# Patient Record
Sex: MALE | Race: WHITE | Employment: FULL TIME | URBAN - METROPOLITAN AREA
[De-identification: names, ages, dates, MRNs, and addresses within clinical notes are randomized per-mention and may not be internally consistent; named-entity substitution may affect disease eponyms.]

---

## 2017-01-06 ENCOUNTER — HOSPITAL ENCOUNTER (OUTPATIENT)
Dept: LAB | Age: 52
Discharge: HOME | End: 2017-01-06
Payer: COMMERCIAL

## 2017-01-06 ENCOUNTER — OFFICE VISIT (OUTPATIENT)
Dept: FAMILY MEDICINE CLINIC | Age: 52
End: 2017-01-06

## 2017-01-06 VITALS
BODY MASS INDEX: 41.02 KG/M2 | HEIGHT: 71 IN | DIASTOLIC BLOOD PRESSURE: 70 MMHG | RESPIRATION RATE: 24 BRPM | HEART RATE: 77 BPM | OXYGEN SATURATION: 97 % | SYSTOLIC BLOOD PRESSURE: 130 MMHG | WEIGHT: 293 LBS | TEMPERATURE: 98.6 F

## 2017-01-06 DIAGNOSIS — Z11.59 NEED FOR HEPATITIS C SCREENING TEST: ICD-10-CM

## 2017-01-06 DIAGNOSIS — I10 ESSENTIAL HYPERTENSION: ICD-10-CM

## 2017-01-06 DIAGNOSIS — F41.0 PANIC ATTACK: ICD-10-CM

## 2017-01-06 DIAGNOSIS — Z11.3 SCREEN FOR STD (SEXUALLY TRANSMITTED DISEASE): ICD-10-CM

## 2017-01-06 DIAGNOSIS — F33.1 MODERATE EPISODE OF RECURRENT MAJOR DEPRESSIVE DISORDER (HCC): ICD-10-CM

## 2017-01-06 DIAGNOSIS — M10.9 ACUTE GOUT OF HAND, UNSPECIFIED CAUSE, UNSPECIFIED LATERALITY: ICD-10-CM

## 2017-01-06 DIAGNOSIS — F41.1 GENERALIZED ANXIETY DISORDER: ICD-10-CM

## 2017-01-06 DIAGNOSIS — F33.1 MODERATE EPISODE OF RECURRENT MAJOR DEPRESSIVE DISORDER (HCC): Primary | ICD-10-CM

## 2017-01-06 LAB
ALBUMIN SERPL-MCNC: 4.7 G/DL (ref 3.9–4.9)
ALP BLD-CCNC: 89 U/L (ref 35–104)
ALT SERPL-CCNC: 38 U/L (ref 0–41)
ANION GAP SERPL CALCULATED.3IONS-SCNC: 15 MEQ/L (ref 7–13)
AST SERPL-CCNC: 46 U/L (ref 0–40)
BASOPHILS ABSOLUTE: 0 K/UL (ref 0–0.2)
BASOPHILS RELATIVE PERCENT: 0.7 %
BILIRUB SERPL-MCNC: 0.9 MG/DL (ref 0–1.2)
BUN BLDV-MCNC: 6 MG/DL (ref 6–20)
CALCIUM SERPL-MCNC: 9.1 MG/DL (ref 8.6–10.2)
CHLORIDE BLD-SCNC: 95 MEQ/L (ref 98–107)
CHOLESTEROL, TOTAL: 193 MG/DL (ref 0–199)
CO2: 24 MEQ/L (ref 22–29)
CREAT SERPL-MCNC: 0.63 MG/DL (ref 0.7–1.2)
EOSINOPHILS ABSOLUTE: 0.1 K/UL (ref 0–0.7)
EOSINOPHILS RELATIVE PERCENT: 3.4 %
GFR AFRICAN AMERICAN: >60
GFR NON-AFRICAN AMERICAN: >60
GLOBULIN: 2.3 G/DL (ref 2.3–3.5)
GLUCOSE BLD-MCNC: 90 MG/DL (ref 74–109)
HCT VFR BLD CALC: 45.3 % (ref 42–52)
HDLC SERPL-MCNC: 77 MG/DL (ref 40–59)
HEMOGLOBIN: 15 G/DL (ref 14–18)
HEPATITIS C ANTIBODY INTERPRETATION: NORMAL
LDL CHOLESTEROL CALCULATED: 101 MG/DL (ref 0–129)
LYMPHOCYTES ABSOLUTE: 1 K/UL (ref 1–4.8)
LYMPHOCYTES RELATIVE PERCENT: 24 %
MCH RBC QN AUTO: 33.9 PG (ref 27–31.3)
MCHC RBC AUTO-ENTMCNC: 33.2 % (ref 33–37)
MCV RBC AUTO: 102 FL (ref 80–100)
MONOCYTES ABSOLUTE: 0.5 K/UL (ref 0.2–0.8)
MONOCYTES RELATIVE PERCENT: 11.9 %
NEUTROPHILS ABSOLUTE: 2.6 K/UL (ref 1.4–6.5)
NEUTROPHILS RELATIVE PERCENT: 60 %
PDW BLD-RTO: 14.4 % (ref 11.5–14.5)
PLATELET # BLD: 133 K/UL (ref 130–400)
POTASSIUM SERPL-SCNC: 4.2 MEQ/L (ref 3.5–5.1)
RBC # BLD: 4.44 M/UL (ref 4.7–6.1)
SODIUM BLD-SCNC: 134 MEQ/L (ref 132–144)
T4 FREE: 0.93 NG/DL (ref 0.93–1.7)
TOTAL PROTEIN: 7 G/DL (ref 6.4–8.1)
TRIGL SERPL-MCNC: 74 MG/DL (ref 0–200)
TSH SERPL DL<=0.05 MIU/L-ACNC: 1.17 UIU/ML (ref 0.27–4.2)
URIC ACID, SERUM: 8.5 MG/DL (ref 3.4–7)
WBC # BLD: 4.4 K/UL (ref 4.8–10.8)

## 2017-01-06 PROCEDURE — 86803 HEPATITIS C AB TEST: CPT

## 2017-01-06 PROCEDURE — 36415 COLL VENOUS BLD VENIPUNCTURE: CPT

## 2017-01-06 PROCEDURE — 86592 SYPHILIS TEST NON-TREP QUAL: CPT

## 2017-01-06 PROCEDURE — 80053 COMPREHEN METABOLIC PANEL: CPT

## 2017-01-06 PROCEDURE — 86694 HERPES SIMPLEX NES ANTBDY: CPT

## 2017-01-06 PROCEDURE — 84439 ASSAY OF FREE THYROXINE: CPT

## 2017-01-06 PROCEDURE — 86703 HIV-1/HIV-2 1 RESULT ANTBDY: CPT

## 2017-01-06 PROCEDURE — 84550 ASSAY OF BLOOD/URIC ACID: CPT

## 2017-01-06 PROCEDURE — 99213 OFFICE O/P EST LOW 20 MIN: CPT | Performed by: NURSE PRACTITIONER

## 2017-01-06 PROCEDURE — 85025 COMPLETE CBC W/AUTO DIFF WBC: CPT

## 2017-01-06 PROCEDURE — 80061 LIPID PANEL: CPT

## 2017-01-06 PROCEDURE — 84443 ASSAY THYROID STIM HORMONE: CPT

## 2017-01-06 PROCEDURE — 86696 HERPES SIMPLEX TYPE 2 TEST: CPT

## 2017-01-06 RX ORDER — ALPRAZOLAM 0.25 MG/1
0.25 TABLET ORAL 3 TIMES DAILY PRN
Qty: 20 TABLET | Refills: 0 | Status: SHIPPED | OUTPATIENT
Start: 2017-01-06 | End: 2017-02-05

## 2017-01-06 RX ORDER — FLUOXETINE HYDROCHLORIDE 40 MG/1
40 CAPSULE ORAL DAILY
Qty: 30 CAPSULE | Refills: 2 | Status: SHIPPED | OUTPATIENT
Start: 2017-01-06 | End: 2017-09-20 | Stop reason: ALTCHOICE

## 2017-01-08 LAB
HERPES TYPE 1/2 IGM COMBINED: 0.55 IV
HERPES TYPE I/II IGG COMBINED: 9.51 IV
HIV-1 AND HIV-2 ANTIBODIES: NEGATIVE
RPR: NORMAL

## 2017-05-05 ENCOUNTER — TELEPHONE (OUTPATIENT)
Dept: FAMILY MEDICINE CLINIC | Age: 52
End: 2017-05-05

## 2017-05-26 DIAGNOSIS — I10 ESSENTIAL HYPERTENSION: ICD-10-CM

## 2017-05-26 DIAGNOSIS — M10.9 ACUTE GOUT OF HAND, UNSPECIFIED CAUSE, UNSPECIFIED LATERALITY: ICD-10-CM

## 2017-05-26 RX ORDER — COLCHICINE 0.6 MG/1
0.6 TABLET ORAL DAILY
Qty: 30 TABLET | Refills: 0 | Status: SHIPPED | OUTPATIENT
Start: 2017-05-26 | End: 2017-06-12 | Stop reason: CLARIF

## 2017-05-26 RX ORDER — LISINOPRIL 40 MG/1
40 TABLET ORAL DAILY
Qty: 30 TABLET | Refills: 3 | Status: SHIPPED | OUTPATIENT
Start: 2017-05-26 | End: 2017-10-15 | Stop reason: SDUPTHER

## 2017-06-12 ENCOUNTER — TELEPHONE (OUTPATIENT)
Dept: FAMILY MEDICINE CLINIC | Age: 52
End: 2017-06-12

## 2017-06-12 RX ORDER — COLCHICINE 0.6 MG/1
1 CAPSULE ORAL DAILY
Qty: 30 CAPSULE | Refills: 5 | Status: SHIPPED | OUTPATIENT
Start: 2017-06-12 | End: 2017-11-30 | Stop reason: SDUPTHER

## 2017-07-05 ENCOUNTER — OFFICE VISIT (OUTPATIENT)
Dept: FAMILY MEDICINE CLINIC | Age: 52
End: 2017-07-05

## 2017-07-05 VITALS
WEIGHT: 305.6 LBS | BODY MASS INDEX: 42.78 KG/M2 | RESPIRATION RATE: 16 BRPM | TEMPERATURE: 97.9 F | SYSTOLIC BLOOD PRESSURE: 146 MMHG | HEIGHT: 71 IN | DIASTOLIC BLOOD PRESSURE: 76 MMHG | HEART RATE: 88 BPM

## 2017-07-05 DIAGNOSIS — M79.89 SWELLING OF RIGHT LOWER EXTREMITY: Primary | ICD-10-CM

## 2017-07-05 PROCEDURE — 99213 OFFICE O/P EST LOW 20 MIN: CPT | Performed by: NURSE PRACTITIONER

## 2017-07-07 ENCOUNTER — HOSPITAL ENCOUNTER (OUTPATIENT)
Dept: ULTRASOUND IMAGING | Age: 52
Discharge: HOME | End: 2017-07-07
Payer: COMMERCIAL

## 2017-07-07 DIAGNOSIS — M79.89 SWELLING OF RIGHT LOWER EXTREMITY: ICD-10-CM

## 2017-07-07 PROCEDURE — 93971 EXTREMITY STUDY: CPT

## 2017-07-09 ASSESSMENT — ENCOUNTER SYMPTOMS
CONSTIPATION: 0
BACK PAIN: 0
SHORTNESS OF BREATH: 0
ABDOMINAL DISTENTION: 0
NAUSEA: 0
ABDOMINAL PAIN: 0
VOMITING: 0
COUGH: 0
COLOR CHANGE: 0
CHEST TIGHTNESS: 0
ANAL BLEEDING: 0
WHEEZING: 0
DIARRHEA: 0
BLOOD IN STOOL: 0

## 2017-09-20 ENCOUNTER — OFFICE VISIT (OUTPATIENT)
Dept: FAMILY MEDICINE CLINIC | Age: 52
End: 2017-09-20

## 2017-09-20 VITALS
HEART RATE: 74 BPM | OXYGEN SATURATION: 93 % | BODY MASS INDEX: 41.72 KG/M2 | DIASTOLIC BLOOD PRESSURE: 80 MMHG | TEMPERATURE: 98.6 F | SYSTOLIC BLOOD PRESSURE: 130 MMHG | HEIGHT: 71 IN | WEIGHT: 298 LBS | RESPIRATION RATE: 18 BRPM

## 2017-09-20 DIAGNOSIS — J11.1 FLU SYNDROME: Primary | ICD-10-CM

## 2017-09-20 DIAGNOSIS — J02.0 STREP SORE THROAT: ICD-10-CM

## 2017-09-20 LAB
INFLUENZA A ANTIBODY: ABNORMAL
INFLUENZA B ANTIBODY: NEGATIVE
S PYO AG THROAT QL: NORMAL

## 2017-09-20 PROCEDURE — 99213 OFFICE O/P EST LOW 20 MIN: CPT | Performed by: FAMILY MEDICINE

## 2017-09-20 PROCEDURE — 87880 STREP A ASSAY W/OPTIC: CPT | Performed by: FAMILY MEDICINE

## 2017-09-20 PROCEDURE — 87804 INFLUENZA ASSAY W/OPTIC: CPT | Performed by: FAMILY MEDICINE

## 2017-09-20 RX ORDER — OSELTAMIVIR PHOSPHATE 75 MG/1
75 CAPSULE ORAL 2 TIMES DAILY
Qty: 10 CAPSULE | Refills: 0 | Status: SHIPPED | OUTPATIENT
Start: 2017-09-20 | End: 2017-09-25

## 2017-09-22 ASSESSMENT — ENCOUNTER SYMPTOMS
SORE THROAT: 1
CONSTIPATION: 0
RHINORRHEA: 0
SHORTNESS OF BREATH: 0
WHEEZING: 0
BLOOD IN STOOL: 0
APNEA: 0
ABDOMINAL PAIN: 0
COUGH: 1
DIARRHEA: 0
CHEST TIGHTNESS: 0
NAUSEA: 0
VOMITING: 0
SWOLLEN GLANDS: 0
SINUS PAIN: 0

## 2017-09-28 ENCOUNTER — TELEPHONE (OUTPATIENT)
Dept: FAMILY MEDICINE CLINIC | Age: 52
End: 2017-09-28

## 2017-09-29 ENCOUNTER — HOSPITAL ENCOUNTER (OUTPATIENT)
Age: 52
Discharge: HOME | End: 2017-09-29
Payer: COMMERCIAL

## 2017-09-29 ENCOUNTER — OFFICE VISIT (OUTPATIENT)
Dept: FAMILY MEDICINE CLINIC | Age: 52
End: 2017-09-29

## 2017-09-29 ENCOUNTER — HOSPITAL ENCOUNTER (OUTPATIENT)
Dept: GENERAL RADIOLOGY | Age: 52
Discharge: HOME | End: 2017-09-29
Payer: COMMERCIAL

## 2017-09-29 VITALS
DIASTOLIC BLOOD PRESSURE: 80 MMHG | BODY MASS INDEX: 41.8 KG/M2 | SYSTOLIC BLOOD PRESSURE: 140 MMHG | RESPIRATION RATE: 18 BRPM | HEIGHT: 70 IN | WEIGHT: 292 LBS | TEMPERATURE: 97.8 F | HEART RATE: 88 BPM | OXYGEN SATURATION: 95 %

## 2017-09-29 DIAGNOSIS — J06.9 VIRAL UPPER RESPIRATORY TRACT INFECTION: ICD-10-CM

## 2017-09-29 DIAGNOSIS — J06.9 VIRAL UPPER RESPIRATORY TRACT INFECTION: Primary | ICD-10-CM

## 2017-09-29 PROCEDURE — 71020 XR CHEST STANDARD TWO VW: CPT

## 2017-09-29 PROCEDURE — 99213 OFFICE O/P EST LOW 20 MIN: CPT | Performed by: FAMILY MEDICINE

## 2017-09-29 RX ORDER — LEVOFLOXACIN 750 MG/1
750 TABLET ORAL DAILY
Qty: 5 TABLET | Refills: 0 | Status: SHIPPED | OUTPATIENT
Start: 2017-09-29 | End: 2017-10-04

## 2017-09-29 ASSESSMENT — ENCOUNTER SYMPTOMS
CHEST TIGHTNESS: 0
NAUSEA: 0
ABDOMINAL PAIN: 0
VOMITING: 0
COUGH: 1
WHEEZING: 0
APNEA: 0
CONSTIPATION: 0
SHORTNESS OF BREATH: 0
SORE THROAT: 0
DIARRHEA: 0
RHINORRHEA: 0
BLOOD IN STOOL: 0

## 2017-10-15 DIAGNOSIS — I10 ESSENTIAL HYPERTENSION: ICD-10-CM

## 2017-10-15 NOTE — TELEPHONE ENCOUNTER
Pharmacy is requesting refill. Please approve or deny this refill request. The order is pended. Thank you. Requested Prescriptions     Pending Prescriptions Disp Refills    lisinopril (PRINIVIL;ZESTRIL) 40 MG tablet [Pharmacy Med Name: LISINOPRIL 40 MG TABLET] 30 tablet 3     Sig: take 1 tablet by mouth once daily       LOV Visit date not found    Next Visit Date:  No future appointments.

## 2017-10-16 RX ORDER — LISINOPRIL 40 MG/1
TABLET ORAL
Qty: 30 TABLET | Refills: 3 | Status: SHIPPED | OUTPATIENT
Start: 2017-10-16 | End: 2018-02-23 | Stop reason: SDUPTHER

## 2017-11-30 RX ORDER — COLCHICINE 0.6 MG/1
1 CAPSULE ORAL DAILY
Qty: 90 CAPSULE | Refills: 1 | Status: SHIPPED | OUTPATIENT
Start: 2017-11-30 | End: 2017-12-18 | Stop reason: SDUPTHER

## 2017-12-02 DIAGNOSIS — M10.9 ACUTE GOUT OF HAND, UNSPECIFIED CAUSE, UNSPECIFIED LATERALITY: ICD-10-CM

## 2017-12-12 DIAGNOSIS — M10.9 ACUTE GOUT OF HAND, UNSPECIFIED CAUSE, UNSPECIFIED LATERALITY: Primary | ICD-10-CM

## 2017-12-12 RX ORDER — COLCHICINE 0.6 MG/1
1 CAPSULE ORAL DAILY
Qty: 90 CAPSULE | Refills: 1 | OUTPATIENT
Start: 2017-12-12

## 2017-12-18 RX ORDER — COLCHICINE 0.6 MG/1
1 CAPSULE ORAL DAILY
Qty: 30 CAPSULE | Refills: 0 | Status: SHIPPED | OUTPATIENT
Start: 2017-12-18 | End: 2018-02-01 | Stop reason: SDUPTHER

## 2017-12-18 NOTE — TELEPHONE ENCOUNTER
Patient is aware. States he does not use it daily. Can you send 30 day to [facility] in [place]. (RX is pending in this encounter).

## 2018-02-01 ENCOUNTER — TELEPHONE (OUTPATIENT)
Dept: FAMILY MEDICINE CLINIC | Age: 53
End: 2018-02-01

## 2018-02-01 ENCOUNTER — HOSPITAL ENCOUNTER (OUTPATIENT)
Dept: ULTRASOUND IMAGING | Age: 53
Discharge: HOME | End: 2018-02-03
Payer: COMMERCIAL

## 2018-02-01 ENCOUNTER — OFFICE VISIT (OUTPATIENT)
Dept: FAMILY MEDICINE CLINIC | Age: 53
End: 2018-02-01
Payer: COMMERCIAL

## 2018-02-01 ENCOUNTER — HOSPITAL ENCOUNTER (OUTPATIENT)
Dept: LAB | Age: 53
Discharge: HOME | End: 2018-02-01
Payer: COMMERCIAL

## 2018-02-01 DIAGNOSIS — M10.9 ACUTE GOUT OF HAND, UNSPECIFIED CAUSE, UNSPECIFIED LATERALITY: ICD-10-CM

## 2018-02-01 DIAGNOSIS — M79.89 SWELLING OF LOWER EXTREMITY: ICD-10-CM

## 2018-02-01 DIAGNOSIS — G62.9 PERIPHERAL POLYNEUROPATHY: ICD-10-CM

## 2018-02-01 DIAGNOSIS — M10.9 GOUT, UNSPECIFIED CAUSE, UNSPECIFIED CHRONICITY, UNSPECIFIED SITE: ICD-10-CM

## 2018-02-01 DIAGNOSIS — M79.89 SWELLING OF LOWER EXTREMITY: Primary | ICD-10-CM

## 2018-02-01 LAB
ANION GAP SERPL CALCULATED.3IONS-SCNC: 14 MEQ/L (ref 7–13)
BUN BLDV-MCNC: 9 MG/DL (ref 6–20)
CALCIUM SERPL-MCNC: 9.3 MG/DL (ref 8.6–10.2)
CHLORIDE BLD-SCNC: 96 MEQ/L (ref 98–107)
CO2: 27 MEQ/L (ref 22–29)
CREAT SERPL-MCNC: 0.78 MG/DL (ref 0.7–1.2)
FOLATE: 6 NG/ML (ref 7.3–26.1)
GFR AFRICAN AMERICAN: >60
GFR NON-AFRICAN AMERICAN: >60
GLUCOSE BLD-MCNC: 105 MG/DL (ref 74–109)
POTASSIUM SERPL-SCNC: 4.4 MEQ/L (ref 3.5–5.1)
SODIUM BLD-SCNC: 137 MEQ/L (ref 132–144)
TSH REFLEX: 2.29 UIU/ML (ref 0.27–4.2)
VITAMIN B-12: <150 PG/ML (ref 232–1245)

## 2018-02-01 PROCEDURE — 82746 ASSAY OF FOLIC ACID SERUM: CPT

## 2018-02-01 PROCEDURE — G8484 FLU IMMUNIZE NO ADMIN: HCPCS | Performed by: FAMILY MEDICINE

## 2018-02-01 PROCEDURE — 36415 COLL VENOUS BLD VENIPUNCTURE: CPT

## 2018-02-01 PROCEDURE — G8427 DOCREV CUR MEDS BY ELIG CLIN: HCPCS | Performed by: FAMILY MEDICINE

## 2018-02-01 PROCEDURE — 3017F COLORECTAL CA SCREEN DOC REV: CPT | Performed by: FAMILY MEDICINE

## 2018-02-01 PROCEDURE — 99214 OFFICE O/P EST MOD 30 MIN: CPT | Performed by: FAMILY MEDICINE

## 2018-02-01 PROCEDURE — 4004F PT TOBACCO SCREEN RCVD TLK: CPT | Performed by: FAMILY MEDICINE

## 2018-02-01 PROCEDURE — G8417 CALC BMI ABV UP PARAM F/U: HCPCS | Performed by: FAMILY MEDICINE

## 2018-02-01 PROCEDURE — 93970 EXTREMITY STUDY: CPT

## 2018-02-01 PROCEDURE — 84443 ASSAY THYROID STIM HORMONE: CPT

## 2018-02-01 PROCEDURE — 80048 BASIC METABOLIC PNL TOTAL CA: CPT

## 2018-02-01 PROCEDURE — 82607 VITAMIN B-12: CPT

## 2018-02-01 RX ORDER — COLCHICINE 0.6 MG/1
1 CAPSULE ORAL DAILY
Qty: 30 CAPSULE | Refills: 0 | Status: SHIPPED | OUTPATIENT
Start: 2018-02-01 | End: 2018-04-05 | Stop reason: SDUPTHER

## 2018-02-01 RX ORDER — ALLOPURINOL 300 MG/1
300 TABLET ORAL DAILY
Qty: 30 TABLET | Refills: 3 | Status: SHIPPED | OUTPATIENT
Start: 2018-02-01 | End: 2018-03-06 | Stop reason: SDUPTHER

## 2018-02-01 ASSESSMENT — ENCOUNTER SYMPTOMS
CONSTIPATION: 0
APNEA: 0
BLOOD IN STOOL: 0
VOMITING: 0
SHORTNESS OF BREATH: 0
CHEST TIGHTNESS: 0
DIARRHEA: 0
ABDOMINAL PAIN: 0
NAUSEA: 0
COUGH: 0

## 2018-02-01 ASSESSMENT — PATIENT HEALTH QUESTIONNAIRE - PHQ9
SUM OF ALL RESPONSES TO PHQ QUESTIONS 1-9: 0
2. FEELING DOWN, DEPRESSED OR HOPELESS: 0
SUM OF ALL RESPONSES TO PHQ9 QUESTIONS 1 & 2: 0
1. LITTLE INTEREST OR PLEASURE IN DOING THINGS: 0

## 2018-02-01 NOTE — PROGRESS NOTES
Subjective:      Patient ID: Srinath Bar is a 45 y.o. male who presents today for:  Chief Complaint   Patient presents with    Foot Pain     pt states statews for x2 weeks he has been having his feet feeling cold, stiff and hard to walk. started mild and started worsening. states his feet throb once shoes are off. right foot is worse then left. pt tried soaking feet and taking antiimflammatory and motrin without relief    Health Maintenance     declined till he gets new insurance       HPI   Pt complains of lower extremity swelling x 2 weeks. He states that it began with right foot and now involves his left foot mildly. He states that it associated with a throbbing sensation and mild pain. He states that he has tried compression socks but that his foot throbs when the sock is taken off. He also admitted to a numbness sensation that primarily involves his right foot. He denies any back pain, chest pain or shortness of breath. The swelling began in his right foot but now is up to mid shin level  Past Medical History:   Diagnosis Date    Depression     Dyslipidemia     Gout     Hypertension     Left varicocele     Lumbar sprain 2006    Obesity     Panic attack 1/6/2017    Perirectal abscess     Thoracic sprain 8864    Umbilical hernia      Past Surgical History:   Procedure Laterality Date    HERNIA REPAIR  11/2010     Family History   Problem Relation Age of Onset    Diabetes Mother     Stroke Mother     Coronary Art Dis Mother     Arthritis Father      gouty     Social History     Social History    Marital status:      Spouse name: N/A    Number of children: N/A    Years of education: N/A     Occupational History    Not on file.      Social History Main Topics    Smoking status: Never Smoker    Smokeless tobacco: Current User     Types: Chew    Alcohol use Yes      Comment: 2-3 times per week    Drug use: No    Sexual activity: Not on file     Other Topics Concern    Not on file     Social History Narrative    No narrative on file     Current Outpatient Prescriptions on File Prior to Visit   Medication Sig Dispense Refill    diclofenac (VOLTAREN) 50 MG EC tablet take 1 tablet by mouth three times a day with meals 60 tablet 3    lisinopril (PRINIVIL;ZESTRIL) 40 MG tablet take 1 tablet by mouth once daily 30 tablet 3    traMADol (ULTRAM) 50 MG tablet Take 1 tablet by mouth every 6 hours as needed for Pain 30 tablet 0     No current facility-administered medications on file prior to visit. Allergies:  Review of patient's allergies indicates no known allergies. Review of Systems   Constitutional: Negative for activity change, appetite change and fatigue. Respiratory: Negative for apnea, cough, chest tightness and shortness of breath. Cardiovascular: Positive for leg swelling (R>L). Negative for chest pain and palpitations. Gastrointestinal: Negative for abdominal pain, blood in stool, constipation, diarrhea, nausea and vomiting. Musculoskeletal: Negative for arthralgias. Neurological: Positive for numbness. Negative for seizures, syncope, weakness and headaches. Psychiatric/Behavioral: Negative for hallucinations and suicidal ideas. Objective:   BP (!) 150/100 (Site: Right Arm, Position: Sitting, Cuff Size: Large Adult)   Pulse 82   Temp 98.9 °F (37.2 °C) (Temporal)   Ht 5' 11\" (1.803 m)   Wt (!) 311 lb 3.2 oz (141.2 kg)   SpO2 96%   BMI 43.40 kg/m²     Physical Exam   Constitutional: He is oriented to person, place, and time. He appears well-developed and well-nourished. No distress. HENT:   Head: Normocephalic and atraumatic. Eyes: Conjunctivae and EOM are normal. Pupils are equal, round, and reactive to light. Neck: Normal range of motion. Cardiovascular: Normal rate, regular rhythm, normal heart sounds and normal pulses. Exam reveals no gallop and no friction rub. No murmur heard.   Lower extremity swelling: Right +2 pitting edema ,

## 2018-02-02 ENCOUNTER — TELEPHONE (OUTPATIENT)
Dept: FAMILY MEDICINE CLINIC | Age: 53
End: 2018-02-02

## 2018-02-02 RX ORDER — TRAMADOL HYDROCHLORIDE 50 MG/1
50 TABLET ORAL EVERY 6 HOURS PRN
Qty: 30 TABLET | Refills: 0 | OUTPATIENT
Start: 2018-02-02

## 2018-02-02 NOTE — TELEPHONE ENCOUNTER
Notes Recorded by Ethan Dixon on 2/2/2018 at 10:27 AM EST  Patient called back wanted to know his labs so I gave them  to him and he would like to get the B12 and Folate taken care of so if you can send over the prescription for this. cp  ------    Notes Recorded by Savannah Alonzo MD on 2/2/2018 at 9:06 AM EST  Your vitamin b12 is very low as well as your folate levels. This may be a reason for the numbness. I would recommend replacing both. Thyroid function is within normal limits.

## 2018-02-03 NOTE — TELEPHONE ENCOUNTER
Patient is aware, states that you advised trying B12 and Folate with most recent lab results so will get over the counter first and see if that helps and if it does not help he will make a appointment with you to discuss this.

## 2018-02-05 VITALS
WEIGHT: 311.2 LBS | SYSTOLIC BLOOD PRESSURE: 140 MMHG | HEIGHT: 71 IN | HEART RATE: 82 BPM | DIASTOLIC BLOOD PRESSURE: 90 MMHG | TEMPERATURE: 98.9 F | BODY MASS INDEX: 43.57 KG/M2 | OXYGEN SATURATION: 96 %

## 2018-02-23 ENCOUNTER — OFFICE VISIT (OUTPATIENT)
Dept: FAMILY MEDICINE CLINIC | Age: 53
End: 2018-02-23
Payer: COMMERCIAL

## 2018-02-23 VITALS
DIASTOLIC BLOOD PRESSURE: 68 MMHG | RESPIRATION RATE: 18 BRPM | HEART RATE: 70 BPM | HEIGHT: 71 IN | TEMPERATURE: 98.1 F | OXYGEN SATURATION: 98 % | SYSTOLIC BLOOD PRESSURE: 130 MMHG | BODY MASS INDEX: 43.54 KG/M2 | WEIGHT: 311 LBS

## 2018-02-23 DIAGNOSIS — M10.9 ACUTE GOUT OF HAND, UNSPECIFIED CAUSE, UNSPECIFIED LATERALITY: ICD-10-CM

## 2018-02-23 DIAGNOSIS — I10 ESSENTIAL HYPERTENSION: ICD-10-CM

## 2018-02-23 DIAGNOSIS — I87.2 VENOUS INSUFFICIENCY OF BOTH LOWER EXTREMITIES: Primary | ICD-10-CM

## 2018-02-23 PROCEDURE — G8417 CALC BMI ABV UP PARAM F/U: HCPCS | Performed by: FAMILY MEDICINE

## 2018-02-23 PROCEDURE — G8484 FLU IMMUNIZE NO ADMIN: HCPCS | Performed by: FAMILY MEDICINE

## 2018-02-23 PROCEDURE — G8427 DOCREV CUR MEDS BY ELIG CLIN: HCPCS | Performed by: FAMILY MEDICINE

## 2018-02-23 PROCEDURE — 3017F COLORECTAL CA SCREEN DOC REV: CPT | Performed by: FAMILY MEDICINE

## 2018-02-23 PROCEDURE — 99213 OFFICE O/P EST LOW 20 MIN: CPT | Performed by: FAMILY MEDICINE

## 2018-02-23 PROCEDURE — 4004F PT TOBACCO SCREEN RCVD TLK: CPT | Performed by: FAMILY MEDICINE

## 2018-02-23 RX ORDER — LISINOPRIL 40 MG/1
TABLET ORAL
Qty: 30 TABLET | Refills: 3 | Status: SHIPPED | OUTPATIENT
Start: 2018-02-23 | End: 2018-07-01 | Stop reason: SDUPTHER

## 2018-02-23 RX ORDER — TRAMADOL HYDROCHLORIDE 50 MG/1
50 TABLET ORAL EVERY 8 HOURS PRN
Qty: 15 TABLET | Refills: 0 | Status: SHIPPED | OUTPATIENT
Start: 2018-02-23 | End: 2018-02-28

## 2018-02-23 ASSESSMENT — ENCOUNTER SYMPTOMS
DIARRHEA: 0
NAUSEA: 0
APNEA: 0
SHORTNESS OF BREATH: 0
VOMITING: 0
CONSTIPATION: 0
ABDOMINAL PAIN: 0
COUGH: 0
CHEST TIGHTNESS: 0
BLOOD IN STOOL: 0

## 2018-02-23 NOTE — PROGRESS NOTES
narrative on file     Current Outpatient Prescriptions on File Prior to Visit   Medication Sig Dispense Refill    Colchicine (MITIGARE) 0.6 MG CAPS Take 1 capsule by mouth daily 30 capsule 0    allopurinol (ZYLOPRIM) 300 MG tablet Take 1 tablet by mouth daily 30 tablet 3    diclofenac (VOLTAREN) 50 MG EC tablet take 1 tablet by mouth three times a day with meals 60 tablet 3     No current facility-administered medications on file prior to visit. Allergies:  Patient has no known allergies. Review of Systems   Constitutional: Negative for activity change, appetite change and fatigue. Respiratory: Negative for apnea, cough, chest tightness and shortness of breath. Cardiovascular: Positive for leg swelling. Negative for chest pain and palpitations. Gastrointestinal: Negative for abdominal pain, blood in stool, constipation, diarrhea, nausea and vomiting. Musculoskeletal: Negative for arthralgias. Neurological: Negative for seizures and headaches. Psychiatric/Behavioral: Negative for hallucinations and suicidal ideas. Objective:   /68 (Site: Right Arm, Position: Sitting, Cuff Size: Large Adult)   Pulse 70   Temp 98.1 °F (36.7 °C) (Temporal)   Resp 18   Ht 5' 11\" (1.803 m)   Wt (!) 311 lb (141.1 kg)   SpO2 98%   BMI 43.38 kg/m²     Physical Exam   Constitutional: He is oriented to person, place, and time. He appears well-developed and well-nourished. No distress. HENT:   Head: Normocephalic and atraumatic. Eyes: Conjunctivae and EOM are normal. Pupils are equal, round, and reactive to light. Neck: Normal range of motion. Cardiovascular: Normal rate, regular rhythm, normal heart sounds and normal pulses. Exam reveals no gallop and no friction rub. No murmur heard. Lower extremity swelling: Right +2 pitting edema , most significant on foot and above ankle,trace edema on the left   Pulmonary/Chest: Effort normal and breath sounds normal. No respiratory distress.  He

## 2018-03-06 ENCOUNTER — OFFICE VISIT (OUTPATIENT)
Dept: INTERNAL MEDICINE | Age: 53
End: 2018-03-06
Payer: COMMERCIAL

## 2018-03-06 ENCOUNTER — HOSPITAL ENCOUNTER (OUTPATIENT)
Age: 53
Setting detail: SPECIMEN
Discharge: HOME | End: 2018-03-06
Payer: COMMERCIAL

## 2018-03-06 VITALS
BODY MASS INDEX: 43.57 KG/M2 | DIASTOLIC BLOOD PRESSURE: 80 MMHG | WEIGHT: 312.4 LBS | OXYGEN SATURATION: 98 % | HEART RATE: 73 BPM | SYSTOLIC BLOOD PRESSURE: 139 MMHG

## 2018-03-06 DIAGNOSIS — I87.2 VENOUS INSUFFICIENCY: ICD-10-CM

## 2018-03-06 DIAGNOSIS — E53.8 FOLIC ACID DEFICIENCY: ICD-10-CM

## 2018-03-06 DIAGNOSIS — E53.8 B12 DEFICIENCY: ICD-10-CM

## 2018-03-06 DIAGNOSIS — M10.9 GOUT, UNSPECIFIED CAUSE, UNSPECIFIED CHRONICITY, UNSPECIFIED SITE: Primary | ICD-10-CM

## 2018-03-06 DIAGNOSIS — Z23 NEED FOR TDAP VACCINATION: ICD-10-CM

## 2018-03-06 LAB
CREATININE URINE: 27.4 MG/DL
MICROALBUMIN UR-MCNC: <1.2 MG/DL
MICROALBUMIN/CREAT UR-RTO: NORMAL MG/G (ref 0–30)

## 2018-03-06 PROCEDURE — 90471 IMMUNIZATION ADMIN: CPT | Performed by: FAMILY MEDICINE

## 2018-03-06 PROCEDURE — 99214 OFFICE O/P EST MOD 30 MIN: CPT | Performed by: FAMILY MEDICINE

## 2018-03-06 PROCEDURE — 82043 UR ALBUMIN QUANTITATIVE: CPT

## 2018-03-06 PROCEDURE — 90715 TDAP VACCINE 7 YRS/> IM: CPT | Performed by: FAMILY MEDICINE

## 2018-03-06 PROCEDURE — 82570 ASSAY OF URINE CREATININE: CPT

## 2018-03-06 RX ORDER — ALLOPURINOL 100 MG/1
100 TABLET ORAL DAILY
Qty: 90 TABLET | Refills: 3 | Status: SHIPPED | OUTPATIENT
Start: 2018-03-06

## 2018-03-06 ASSESSMENT — ENCOUNTER SYMPTOMS
ANAL BLEEDING: 0
ABDOMINAL PAIN: 0
COLOR CHANGE: 0
ABDOMINAL DISTENTION: 0

## 2018-03-09 DIAGNOSIS — M10.9 ACUTE GOUT OF HAND, UNSPECIFIED CAUSE, UNSPECIFIED LATERALITY: ICD-10-CM

## 2018-04-05 ENCOUNTER — OFFICE VISIT (OUTPATIENT)
Dept: INTERVENTIONAL RADIOLOGY/VASCULAR | Age: 53
End: 2018-04-05
Payer: COMMERCIAL

## 2018-04-05 VITALS
RESPIRATION RATE: 16 BRPM | DIASTOLIC BLOOD PRESSURE: 60 MMHG | HEART RATE: 92 BPM | SYSTOLIC BLOOD PRESSURE: 142 MMHG | OXYGEN SATURATION: 95 %

## 2018-04-05 DIAGNOSIS — Z91.199 NON-COMPLIANCE: ICD-10-CM

## 2018-04-05 DIAGNOSIS — E66.09 CLASS 2 OBESITY DUE TO EXCESS CALORIES WITHOUT SERIOUS COMORBIDITY IN ADULT, UNSPECIFIED BMI: ICD-10-CM

## 2018-04-05 DIAGNOSIS — M10.071 ACUTE IDIOPATHIC GOUT OF RIGHT ANKLE: ICD-10-CM

## 2018-04-05 DIAGNOSIS — M25.571 ACUTE RIGHT ANKLE PAIN: Primary | ICD-10-CM

## 2018-04-05 DIAGNOSIS — M79.89 LEG SWELLING: ICD-10-CM

## 2018-04-05 DIAGNOSIS — M10.9 GOUT, UNSPECIFIED CAUSE, UNSPECIFIED CHRONICITY, UNSPECIFIED SITE: ICD-10-CM

## 2018-04-05 PROCEDURE — 99244 OFF/OP CNSLTJ NEW/EST MOD 40: CPT | Performed by: NURSE PRACTITIONER

## 2018-04-05 RX ORDER — COLCHICINE 0.6 MG/1
1 CAPSULE ORAL DAILY
Qty: 90 CAPSULE | Refills: 3 | Status: SHIPPED | OUTPATIENT
Start: 2018-04-05

## 2018-04-05 ASSESSMENT — ENCOUNTER SYMPTOMS
COUGH: 1
SORE THROAT: 0
EYE PAIN: 0
HEARTBURN: 0
SHORTNESS OF BREATH: 0
CONSTIPATION: 0
SPUTUM PRODUCTION: 0
VOMITING: 0
NAUSEA: 0
DIARRHEA: 0
ABDOMINAL PAIN: 0
WHEEZING: 0
SINUS PAIN: 0
BLURRED VISION: 0
BACK PAIN: 0
DOUBLE VISION: 0

## 2018-04-17 ENCOUNTER — OFFICE VISIT (OUTPATIENT)
Dept: INTERNAL MEDICINE | Age: 53
End: 2018-04-17
Payer: COMMERCIAL

## 2018-04-17 ENCOUNTER — HOSPITAL ENCOUNTER (OUTPATIENT)
Age: 53
Setting detail: SPECIMEN
Discharge: HOME | End: 2018-04-17
Payer: COMMERCIAL

## 2018-04-17 VITALS
HEIGHT: 71 IN | BODY MASS INDEX: 44.1 KG/M2 | SYSTOLIC BLOOD PRESSURE: 122 MMHG | DIASTOLIC BLOOD PRESSURE: 68 MMHG | WEIGHT: 315 LBS | HEART RATE: 76 BPM

## 2018-04-17 DIAGNOSIS — M79.671 RIGHT FOOT PAIN: ICD-10-CM

## 2018-04-17 DIAGNOSIS — E78.5 DYSLIPIDEMIA: ICD-10-CM

## 2018-04-17 DIAGNOSIS — E53.8 B12 DEFICIENCY: ICD-10-CM

## 2018-04-17 DIAGNOSIS — Z12.11 SCREEN FOR COLON CANCER: ICD-10-CM

## 2018-04-17 DIAGNOSIS — R60.0 LEG EDEMA: Primary | ICD-10-CM

## 2018-04-17 DIAGNOSIS — M10.9 GOUT, UNSPECIFIED CAUSE, UNSPECIFIED CHRONICITY, UNSPECIFIED SITE: ICD-10-CM

## 2018-04-17 LAB
ALBUMIN SERPL-MCNC: 4.5 G/DL (ref 3.9–4.9)
ALP BLD-CCNC: 101 U/L (ref 35–104)
ALT SERPL-CCNC: 17 U/L (ref 0–41)
ANION GAP SERPL CALCULATED.3IONS-SCNC: 15 MEQ/L (ref 7–13)
AST SERPL-CCNC: 20 U/L (ref 0–40)
BILIRUB SERPL-MCNC: 1.2 MG/DL (ref 0–1.2)
BUN BLDV-MCNC: 8 MG/DL (ref 6–20)
CALCIUM SERPL-MCNC: 9.4 MG/DL (ref 8.6–10.2)
CHLORIDE BLD-SCNC: 91 MEQ/L (ref 98–107)
CHOLESTEROL, TOTAL: 165 MG/DL (ref 0–199)
CO2: 25 MEQ/L (ref 22–29)
CREAT SERPL-MCNC: 0.78 MG/DL (ref 0.7–1.2)
GFR AFRICAN AMERICAN: >60
GFR NON-AFRICAN AMERICAN: >60
GLOBULIN: 2.3 G/DL (ref 2.3–3.5)
GLUCOSE BLD-MCNC: 91 MG/DL (ref 74–109)
HDLC SERPL-MCNC: 34 MG/DL (ref 40–59)
LDL CHOLESTEROL CALCULATED: 96 MG/DL (ref 0–129)
POTASSIUM SERPL-SCNC: 4.4 MEQ/L (ref 3.5–5.1)
SODIUM BLD-SCNC: 131 MEQ/L (ref 132–144)
TOTAL PROTEIN: 6.8 G/DL (ref 6.4–8.1)
TRIGL SERPL-MCNC: 174 MG/DL (ref 0–200)
URIC ACID, SERUM: 6 MG/DL (ref 3.4–7)
VITAMIN B-12: 196 PG/ML (ref 232–1245)

## 2018-04-17 PROCEDURE — 84550 ASSAY OF BLOOD/URIC ACID: CPT

## 2018-04-17 PROCEDURE — 99214 OFFICE O/P EST MOD 30 MIN: CPT | Performed by: FAMILY MEDICINE

## 2018-04-17 PROCEDURE — 80061 LIPID PANEL: CPT

## 2018-04-17 PROCEDURE — 80053 COMPREHEN METABOLIC PANEL: CPT

## 2018-04-17 PROCEDURE — 82607 VITAMIN B-12: CPT

## 2018-04-17 PROCEDURE — 36415 COLL VENOUS BLD VENIPUNCTURE: CPT | Performed by: FAMILY MEDICINE

## 2018-04-17 RX ORDER — ALLOPURINOL 300 MG/1
300 TABLET ORAL DAILY
Qty: 30 TABLET | Refills: 3 | COMMUNITY
Start: 2018-04-17

## 2018-05-09 ENCOUNTER — TELEPHONE (OUTPATIENT)
Dept: INTERNAL MEDICINE | Age: 53
End: 2018-05-09

## 2018-05-18 ENCOUNTER — HOSPITAL ENCOUNTER (OUTPATIENT)
Dept: GENERAL RADIOLOGY | Age: 53
Discharge: HOME | End: 2018-05-20
Payer: COMMERCIAL

## 2018-05-18 DIAGNOSIS — M25.571 RIGHT ANKLE PAIN, UNSPECIFIED CHRONICITY: ICD-10-CM

## 2018-05-18 PROCEDURE — 73610 X-RAY EXAM OF ANKLE: CPT

## 2018-06-12 DIAGNOSIS — M10.9 GOUT, UNSPECIFIED CAUSE, UNSPECIFIED CHRONICITY, UNSPECIFIED SITE: ICD-10-CM

## 2018-06-12 RX ORDER — ALLOPURINOL 300 MG/1
TABLET ORAL
Qty: 30 TABLET | Refills: 3 | Status: SHIPPED | OUTPATIENT
Start: 2018-06-12

## 2018-07-01 DIAGNOSIS — I10 ESSENTIAL HYPERTENSION: ICD-10-CM

## 2018-07-01 RX ORDER — LISINOPRIL 40 MG/1
TABLET ORAL
Qty: 30 TABLET | Refills: 3 | Status: SHIPPED | OUTPATIENT
Start: 2018-07-01

## 2019-04-05 DIAGNOSIS — M10.9 GOUT, UNSPECIFIED CAUSE, UNSPECIFIED CHRONICITY, UNSPECIFIED SITE: ICD-10-CM

## 2019-04-05 RX ORDER — COLCHICINE 0.6 MG/1
CAPSULE ORAL
Qty: 90 CAPSULE | Refills: 3 | OUTPATIENT
Start: 2019-04-05

## 2023-02-09 PROBLEM — M54.50 LOW BACK PAIN: Status: RESOLVED | Noted: 2023-02-09 | Resolved: 2023-02-09

## 2023-02-09 PROBLEM — S89.92XA LEFT KNEE INJURY: Status: RESOLVED | Noted: 2023-02-09 | Resolved: 2023-02-09

## 2023-02-09 PROBLEM — R60.0 LEG EDEMA: Status: RESOLVED | Noted: 2023-02-09 | Resolved: 2023-02-09

## 2023-02-09 PROBLEM — S39.012A ACUTE LUMBOSACRAL MYOFASCIAL STRAIN: Status: RESOLVED | Noted: 2023-02-09 | Resolved: 2023-02-09

## 2023-02-09 PROBLEM — S32.040A COMPRESSION FRACTURE OF FOURTH LUMBAR VERTEBRA (MULTI): Status: ACTIVE | Noted: 2023-02-09

## 2023-02-09 PROBLEM — R06.83 SNORING: Status: RESOLVED | Noted: 2023-02-09 | Resolved: 2023-02-09

## 2023-02-09 PROBLEM — M79.89 RIGHT LEG SWELLING: Status: RESOLVED | Noted: 2023-02-09 | Resolved: 2023-02-09

## 2023-02-09 PROBLEM — G89.29 CHRONIC SI JOINT PAIN: Status: RESOLVED | Noted: 2023-02-09 | Resolved: 2023-02-09

## 2023-02-09 PROBLEM — M10.9 GOUT: Status: ACTIVE | Noted: 2023-02-09

## 2023-02-09 PROBLEM — L25.9 CONTACT DERMATITIS: Status: RESOLVED | Noted: 2023-02-09 | Resolved: 2023-02-09

## 2023-02-09 PROBLEM — I87.2 CHRONIC VENOUS STASIS DERMATITIS OF BOTH LOWER EXTREMITIES: Status: RESOLVED | Noted: 2023-02-09 | Resolved: 2023-02-09

## 2023-02-09 PROBLEM — M53.3 CHRONIC SI JOINT PAIN: Status: RESOLVED | Noted: 2023-02-09 | Resolved: 2023-02-09

## 2023-02-09 PROBLEM — M19.90 ARTHRITIS: Status: RESOLVED | Noted: 2023-02-09 | Resolved: 2023-02-09

## 2023-02-09 PROBLEM — R61 NIGHT SWEAT: Status: RESOLVED | Noted: 2023-02-09 | Resolved: 2023-02-09

## 2023-02-09 PROBLEM — J01.90 ACUTE SINUS INFECTION: Status: RESOLVED | Noted: 2023-02-09 | Resolved: 2023-02-09

## 2023-02-09 PROBLEM — F41.9 ANXIETY: Status: ACTIVE | Noted: 2023-02-09

## 2023-02-09 PROBLEM — S76.011A HIP STRAIN, RIGHT, INITIAL ENCOUNTER: Status: RESOLVED | Noted: 2023-02-09 | Resolved: 2023-02-09

## 2023-02-09 PROBLEM — R63.5 WEIGHT GAIN: Status: RESOLVED | Noted: 2023-02-09 | Resolved: 2023-02-09

## 2023-02-09 PROBLEM — N52.9 ERECTILE DYSFUNCTION: Status: ACTIVE | Noted: 2023-02-09

## 2023-02-09 PROBLEM — M17.10 PRIMARY OSTEOARTHRITIS OF ONE KNEE: Status: RESOLVED | Noted: 2023-02-09 | Resolved: 2023-02-09

## 2023-02-09 PROBLEM — I10 HYPERTENSION: Status: ACTIVE | Noted: 2023-02-09

## 2023-02-09 PROBLEM — E78.5 HYPERLIPIDEMIA: Status: RESOLVED | Noted: 2023-02-09 | Resolved: 2023-02-09

## 2023-02-09 PROBLEM — M54.16 LUMBAR RADICULOPATHY, CHRONIC: Status: RESOLVED | Noted: 2023-02-09 | Resolved: 2023-02-09

## 2023-02-09 PROBLEM — M25.551 RIGHT HIP PAIN: Status: RESOLVED | Noted: 2023-02-09 | Resolved: 2023-02-09

## 2023-02-09 PROBLEM — R20.0 NUMBNESS: Status: RESOLVED | Noted: 2023-02-09 | Resolved: 2023-02-09

## 2023-02-09 PROBLEM — M46.1 SACROILIITIS (CMS-HCC): Status: RESOLVED | Noted: 2023-02-09 | Resolved: 2023-02-09

## 2023-02-09 PROBLEM — F32.5 DEPRESSION, MAJOR, IN REMISSION (CMS-HCC): Status: RESOLVED | Noted: 2023-02-09 | Resolved: 2023-02-09

## 2023-02-09 RX ORDER — SILDENAFIL 50 MG/1
TABLET, FILM COATED ORAL
COMMUNITY
Start: 2020-04-30 | End: 2023-12-06 | Stop reason: ALTCHOICE

## 2023-02-09 RX ORDER — HYDROCODONE BITARTRATE AND ACETAMINOPHEN 10; 325 MG/1; MG/1
1 TABLET ORAL EVERY 8 HOURS PRN
COMMUNITY
Start: 2021-04-16 | End: 2023-03-22 | Stop reason: SDUPTHER

## 2023-02-09 RX ORDER — COLCHICINE 0.6 MG/1
0.6 CAPSULE ORAL 2 TIMES DAILY
COMMUNITY
Start: 2022-01-14 | End: 2023-04-21 | Stop reason: SDUPTHER

## 2023-02-09 RX ORDER — LISINOPRIL 40 MG/1
1 TABLET ORAL DAILY
COMMUNITY
Start: 2019-10-03 | End: 2023-04-21 | Stop reason: SDUPTHER

## 2023-02-09 RX ORDER — FUROSEMIDE 40 MG/1
1 TABLET ORAL DAILY
COMMUNITY
Start: 2021-05-27 | End: 2023-12-06 | Stop reason: SDUPTHER

## 2023-02-09 RX ORDER — DULOXETIN HYDROCHLORIDE 60 MG/1
1 CAPSULE, DELAYED RELEASE ORAL DAILY
COMMUNITY
Start: 2021-05-27 | End: 2023-03-22 | Stop reason: SDUPTHER

## 2023-02-09 RX ORDER — ALLOPURINOL 300 MG/1
1 TABLET ORAL DAILY
COMMUNITY
Start: 2019-10-03 | End: 2023-03-27

## 2023-03-20 LAB
ALANINE AMINOTRANSFERASE (SGPT) (U/L) IN SER/PLAS: 20 U/L (ref 10–52)
ALBUMIN (G/DL) IN SER/PLAS: 4.2 G/DL (ref 3.4–5)
ALKALINE PHOSPHATASE (U/L) IN SER/PLAS: 174 U/L (ref 33–120)
ANION GAP IN SER/PLAS: 13 MMOL/L (ref 10–20)
APPEARANCE, URINE: CLEAR
ASPARTATE AMINOTRANSFERASE (SGOT) (U/L) IN SER/PLAS: 30 U/L (ref 9–39)
BILIRUBIN TOTAL (MG/DL) IN SER/PLAS: 1.6 MG/DL (ref 0–1.2)
BILIRUBIN, URINE: NEGATIVE
BLOOD, URINE: NEGATIVE
CALCIUM (MG/DL) IN SER/PLAS: 9.6 MG/DL (ref 8.6–10.3)
CARBON DIOXIDE, TOTAL (MMOL/L) IN SER/PLAS: 27 MMOL/L (ref 21–32)
CHLORIDE (MMOL/L) IN SER/PLAS: 90 MMOL/L (ref 98–107)
COLOR, URINE: YELLOW
CREATININE (MG/DL) IN SER/PLAS: 0.67 MG/DL (ref 0.5–1.3)
CREATININE (MG/DL) IN URINE: 71.4 MG/DL (ref 20–370)
GFR MALE: >90 ML/MIN/1.73M2
GLUCOSE (MG/DL) IN SER/PLAS: 109 MG/DL (ref 74–99)
GLUCOSE, URINE: NEGATIVE MG/DL
KETONES, URINE: NEGATIVE MG/DL
LEUKOCYTE ESTERASE, URINE: NEGATIVE
MAGNESIUM (MG/DL) IN SER/PLAS: 1.48 MG/DL (ref 1.6–2.4)
NITRITE, URINE: NEGATIVE
OSMOLALITY, RANDOM URINE: 300 MOSM/KG (ref 200–1200)
OSMOLALITY, SERUM: 271 MOSM/KG H2O (ref 280–300)
PH, URINE: 6 (ref 5–8)
PHOSPHATE (MG/DL) IN SER/PLAS: 3.4 MG/DL (ref 2.5–4.9)
POTASSIUM (MMOL/L) IN SER/PLAS: 4.4 MMOL/L (ref 3.5–5.3)
PROTEIN TOTAL: 6.9 G/DL (ref 6.4–8.2)
PROTEIN, URINE: NEGATIVE MG/DL
SODIUM (MMOL/L) IN SER/PLAS: 126 MMOL/L (ref 136–145)
SODIUM URINE RANDOM: 32 MMOL/L
SODIUM/CREATININE (MMOL/G) IN URINE: 45 MMOL/G CREAT
SPECIFIC GRAVITY, URINE: 1.01 (ref 1–1.03)
THYROTROPIN (MIU/L) IN SER/PLAS BY DETECTION LIMIT <= 0.05 MIU/L: 2.7 MIU/L (ref 0.44–3.98)
URATE (MG/DL) IN SER/PLAS: 4.8 MG/DL (ref 4–7.5)
UREA NITROGEN (MG/DL) IN SER/PLAS: 8 MG/DL (ref 6–23)
UROBILINOGEN, URINE: <2 MG/DL (ref 0–1.9)

## 2023-03-22 DIAGNOSIS — S32.040G COMPRESSION FRACTURE OF L4 VERTEBRA WITH DELAYED HEALING, SUBSEQUENT ENCOUNTER: Primary | ICD-10-CM

## 2023-03-22 RX ORDER — DULOXETIN HYDROCHLORIDE 60 MG/1
60 CAPSULE, DELAYED RELEASE ORAL DAILY
Qty: 90 CAPSULE | Refills: 3 | Status: SHIPPED | OUTPATIENT
Start: 2023-03-22

## 2023-03-22 RX ORDER — HYDROCODONE BITARTRATE AND ACETAMINOPHEN 10; 325 MG/1; MG/1
1 TABLET ORAL EVERY 8 HOURS PRN
Qty: 75 TABLET | Refills: 0 | Status: SHIPPED | OUTPATIENT
Start: 2023-03-22 | End: 2023-04-21 | Stop reason: SDUPTHER

## 2023-04-19 LAB
ANION GAP IN SER/PLAS: 12 MMOL/L (ref 10–20)
CALCIUM (MG/DL) IN SER/PLAS: 9.7 MG/DL (ref 8.6–10.3)
CARBON DIOXIDE, TOTAL (MMOL/L) IN SER/PLAS: 29 MMOL/L (ref 21–32)
CHLORIDE (MMOL/L) IN SER/PLAS: 94 MMOL/L (ref 98–107)
CREATININE (MG/DL) IN SER/PLAS: 0.73 MG/DL (ref 0.5–1.3)
GFR MALE: >90 ML/MIN/1.73M2
GLUCOSE (MG/DL) IN SER/PLAS: 148 MG/DL (ref 74–99)
POTASSIUM (MMOL/L) IN SER/PLAS: 4.2 MMOL/L (ref 3.5–5.3)
SODIUM (MMOL/L) IN SER/PLAS: 131 MMOL/L (ref 136–145)
UREA NITROGEN (MG/DL) IN SER/PLAS: 5 MG/DL (ref 6–23)

## 2023-04-21 ENCOUNTER — OFFICE VISIT (OUTPATIENT)
Dept: PRIMARY CARE | Facility: CLINIC | Age: 58
End: 2023-04-21
Payer: MEDICAID

## 2023-04-21 VITALS
OXYGEN SATURATION: 96 % | SYSTOLIC BLOOD PRESSURE: 131 MMHG | DIASTOLIC BLOOD PRESSURE: 69 MMHG | HEIGHT: 70 IN | BODY MASS INDEX: 45.1 KG/M2 | WEIGHT: 315 LBS | HEART RATE: 80 BPM

## 2023-04-21 DIAGNOSIS — S32.040G COMPRESSION FRACTURE OF L4 VERTEBRA WITH DELAYED HEALING, SUBSEQUENT ENCOUNTER: ICD-10-CM

## 2023-04-21 DIAGNOSIS — F41.9 ANXIETY: ICD-10-CM

## 2023-04-21 DIAGNOSIS — E87.1 HYPONATREMIA: ICD-10-CM

## 2023-04-21 DIAGNOSIS — M10.9 GOUT, UNSPECIFIED CAUSE, UNSPECIFIED CHRONICITY, UNSPECIFIED SITE: Primary | ICD-10-CM

## 2023-04-21 DIAGNOSIS — M80.80XD OTHER OSTEOPOROSIS WITH CURRENT PATHOLOGICAL FRACTURE WITH ROUTINE HEALING, SUBSEQUENT ENCOUNTER: ICD-10-CM

## 2023-04-21 DIAGNOSIS — N52.9 ERECTILE DYSFUNCTION, UNSPECIFIED ERECTILE DYSFUNCTION TYPE: ICD-10-CM

## 2023-04-21 DIAGNOSIS — I10 PRIMARY HYPERTENSION: ICD-10-CM

## 2023-04-21 DIAGNOSIS — E29.1 HYPOGONADISM IN MALE: ICD-10-CM

## 2023-04-21 PROBLEM — R79.89 LOW TESTOSTERONE: Status: ACTIVE | Noted: 2023-04-21

## 2023-04-21 PROBLEM — M25.50 JOINT PAIN: Status: ACTIVE | Noted: 2023-04-21

## 2023-04-21 PROBLEM — R35.1 NOCTURIA: Status: ACTIVE | Noted: 2023-04-21

## 2023-04-21 PROBLEM — M81.0 OSTEOPOROSIS: Status: ACTIVE | Noted: 2023-04-21

## 2023-04-21 PROBLEM — M85.80 OSTEOPENIA DETERMINED BY X-RAY: Status: RESOLVED | Noted: 2023-04-21 | Resolved: 2023-04-21

## 2023-04-21 PROCEDURE — 3075F SYST BP GE 130 - 139MM HG: CPT | Performed by: INTERNAL MEDICINE

## 2023-04-21 PROCEDURE — 3078F DIAST BP <80 MM HG: CPT | Performed by: INTERNAL MEDICINE

## 2023-04-21 PROCEDURE — 99214 OFFICE O/P EST MOD 30 MIN: CPT | Performed by: INTERNAL MEDICINE

## 2023-04-21 RX ORDER — HYDROCODONE BITARTRATE AND ACETAMINOPHEN 10; 325 MG/1; MG/1
1 TABLET ORAL EVERY 8 HOURS PRN
Qty: 75 TABLET | Refills: 0 | Status: SHIPPED | OUTPATIENT
Start: 2023-04-21 | End: 2023-05-22 | Stop reason: SDUPTHER

## 2023-04-21 RX ORDER — LISINOPRIL 40 MG/1
40 TABLET ORAL DAILY
Qty: 30 TABLET | Refills: 11 | Status: SHIPPED | OUTPATIENT
Start: 2023-04-21 | End: 2024-03-29 | Stop reason: SDUPTHER

## 2023-04-21 RX ORDER — CALCIUM CARBONATE 600 MG
600 TABLET ORAL DAILY
COMMUNITY

## 2023-04-21 RX ORDER — MULTIVITAMIN
1 TABLET ORAL DAILY
COMMUNITY

## 2023-04-21 RX ORDER — SODIUM CHLORIDE 1 G/1
2 TABLET ORAL 2 TIMES DAILY
COMMUNITY
Start: 2023-03-22 | End: 2023-12-06 | Stop reason: SDUPTHER

## 2023-04-21 RX ORDER — PREGABALIN 75 MG/1
1 CAPSULE ORAL DAILY
COMMUNITY
End: 2023-12-06 | Stop reason: ALTCHOICE

## 2023-04-21 RX ORDER — TESTOSTERONE UNDECANOATE 237 MG/1
1 CAPSULE, LIQUID FILLED ORAL 2 TIMES DAILY
COMMUNITY
Start: 2023-03-02 | End: 2023-12-06 | Stop reason: ALTCHOICE

## 2023-04-21 RX ORDER — COLCHICINE 0.6 MG/1
0.6 CAPSULE ORAL 2 TIMES DAILY
Qty: 60 CAPSULE | Refills: 11 | Status: SHIPPED | OUTPATIENT
Start: 2023-04-21 | End: 2024-04-20

## 2023-04-21 RX ORDER — TESTOSTERONE CYPIONATE 200 MG/ML
INJECTION, SOLUTION INTRAMUSCULAR
COMMUNITY
Start: 2023-03-13 | End: 2023-10-11

## 2023-04-21 ASSESSMENT — ENCOUNTER SYMPTOMS
EYE DISCHARGE: 0
EYE ITCHING: 0
ABDOMINAL DISTENTION: 0
ARTHRALGIAS: 0
APPETITE CHANGE: 0
BACK PAIN: 1
APNEA: 0
FATIGUE: 1
ACTIVITY CHANGE: 0

## 2023-04-21 NOTE — PROGRESS NOTES
"Subjective   Patient ID: Tr Arnold is a 57 y.o. male who presents for Follow-up.  HPI  Patient is here today for 3 mo follow up     Patient has been seeing Dr Person, for low T, he just had second Testosterone injection.     He did see Dr Tomlin for hyponatremia, likely 2/2 alcohol beer consumption, he has decreased his alcohol intake, taking salt tabs.    Patient has 1st appt with pain management next week.     Review of Systems   Constitutional:  Positive for fatigue. Negative for activity change and appetite change.   HENT:  Negative for congestion, mouth sores, nosebleeds and postnasal drip.    Eyes:  Negative for discharge and itching.   Respiratory:  Negative for apnea.    Gastrointestinal:  Negative for abdominal distention.   Musculoskeletal:  Positive for back pain. Negative for arthralgias.       Objective   /69   Pulse 80   Ht 1.778 m (5' 10\")   Wt (!) 159 kg (350 lb)   SpO2 96%   BMI 50.22 kg/m²     Physical Exam  Constitutional:       General: He is not in acute distress.     Appearance: Normal appearance. He is not toxic-appearing.   HENT:      Head: Normocephalic and atraumatic.      Right Ear: Tympanic membrane, ear canal and external ear normal.      Left Ear: Tympanic membrane, ear canal and external ear normal.      Nose: Nose normal.      Mouth/Throat:      Mouth: Mucous membranes are moist.      Pharynx: Oropharynx is clear.   Eyes:      Extraocular Movements: Extraocular movements intact.      Conjunctiva/sclera: Conjunctivae normal.      Pupils: Pupils are equal, round, and reactive to light.   Cardiovascular:      Rate and Rhythm: Normal rate and regular rhythm.      Heart sounds: No murmur heard.     No friction rub. No gallop.   Pulmonary:      Effort: Pulmonary effort is normal.      Breath sounds: Normal breath sounds.   Abdominal:      General: Bowel sounds are normal. There is no distension.      Palpations: Abdomen is soft. There is no mass.      Tenderness: There is " no abdominal tenderness. There is no guarding.   Musculoskeletal:         General: Swelling present. Normal range of motion.      Cervical back: Normal range of motion.      Comments: Chronic venous stasis, plus 1 pitting edema    Skin:     General: Skin is warm and dry.   Neurological:      General: No focal deficit present.      Mental Status: He is alert and oriented to person, place, and time.   Psychiatric:         Mood and Affect: Mood normal.         Thought Content: Thought content normal.         Judgment: Judgment normal.         I have personally reviewed the OARRS report for WESLEY PACE. I have considered the risks of abuse, dependence, addiction and diversion.   Is the patient prescribed a combination of a benzodiazepine and opioid? No.   Last urine drug screening date/ordered today: 4/22/22   Results of last screen: Results as expected.   Controlled Substance Agreement:   I have printed this form and reviewed each line item with the patient and the patient has verbalized understanding.   Date of the last Controlled Substance Agreement: 4/22/22   OPIOID   What is the patient’s goal of therapy? improvement of pain.  Is this being achieved with current treatment? yes.   Attestation statement: I feel that it is clinically indicated to continue this current medication regimen after consideration of alternative therapies, and other non-opioid treatments.   Opioid Risk Screening:   Opioid Risk Tool   Last opioid risk screening date/ordered today: 04/22/22  Patient's total score is 0, within range of Low Risk (<= 3).   Pain Scale Screening:   Pain Assessment and Documentation Tool (PADT)   Date of Assessment: 4/21/2023  Analgesia:   Patient reports his pain level on average during the past week is 7 on a 0 - 10 scale.   Patient reports that his pain level at its worst during the past week was 10 on a 0 -10 scale.   70 % of pain has been relieved during the past week per patient   Patient states that the  amount of pain relief he is now obtaining from his current pain reliever(s) is enough to make a real difference in his life.   Query to clinician: Is the patient's pain relief clinically significant? Yes  Activities of Daily Living:   Physical functioning: Better   Family relationships: Better   Social relationships: Better   Mood: Better   Sleep patterns: Better   Overall functioning: Better   Adverse Events:   No, WESLEY PACE is not experiencing side effects from current pain reliever.  I have calculated the patient's Morphine Dose Equivalent (MED):   I have considered referral to Pain Management and/or a specialist, and do not feel it is necessary at this time.      Assessment/Plan   Problem List Items Addressed This Visit          Circulatory    Hypertension    Relevant Medications    lisinopril 40 mg tablet       Genitourinary    Erectile dysfunction       Musculoskeletal    Compression fracture of fourth lumbar vertebra (CMS/HCC)    Relevant Medications    HYDROcodone-acetaminophen (Norco)  mg tablet    Osteoporosis       Endocrine/Metabolic    Hypogonadism in male       Other    Anxiety    Gout - Primary    Relevant Medications    colchicine (Mitigare) 0.6 mg capsule capsule    Hyponatremia     1. Left avascular necrosis s/p hip replacement, found to have lumbar compression fracture, was referred to Neurosurgery but because he had lost insurance was not able to pursue at that time, did eventually see Neurosurgery and recommended seeing pain management for injections, will have appt next week to further discuss  - on lyrica 75mg po tid   - CSA and UDS up to date and as expected in April 22 , will need to update at next appt   -continue percocet 10/325mg po tid prn   - I have personally reviewed this patient's OARRS report and found it to be appropriate. The report has been uploaded into the medical record. I have considered the risks of abuse, addiction, dependence, and diversion and feel that it is  clinically appropriate for this patient to be prescribed this controlled medication.     2. Depression, stable   - continue Cymbalta 60mg po daily      3. HTN , controlled   - continue lisinopril 40mg po daily     4. Chronic venous stasis changes on maribell legs  - can continue Lasix as needed , use compression stockings    5. Gout , controlled   - continue allopurinol   - mitigare prn in times of flare     6. Hyponatremia 2/2 alcohol   - following with nephrology  - has cut back on his alcohol  - taking na tabs   - na increased to 131     7. Osteoporosis, found to have low T, multifactorial with drinking alcohol and low t  - on testosterone replacement  - calcium and vit d   - repeat bone density in 2 years     Final diagnoses:   [S32.040G] Compression fracture of L4 vertebra with delayed healing, subsequent encounter   [M10.9] Gout, unspecified cause, unspecified chronicity, unspecified site   [I10] Primary hypertension   [N52.9] Erectile dysfunction, unspecified erectile dysfunction type   [M80.80XD] Other osteoporosis with current pathological fracture with routine healing, subsequent encounter   [E29.1] Hypogonadism in male   [F41.9] Anxiety   [E87.1] Hyponatremia

## 2023-05-22 DIAGNOSIS — S32.040G COMPRESSION FRACTURE OF L4 VERTEBRA WITH DELAYED HEALING, SUBSEQUENT ENCOUNTER: ICD-10-CM

## 2023-05-23 RX ORDER — HYDROCODONE BITARTRATE AND ACETAMINOPHEN 10; 325 MG/1; MG/1
1 TABLET ORAL EVERY 8 HOURS PRN
Qty: 75 TABLET | Refills: 0 | Status: SHIPPED | OUTPATIENT
Start: 2023-05-23 | End: 2023-06-20 | Stop reason: SDUPTHER

## 2023-06-20 DIAGNOSIS — S32.040G COMPRESSION FRACTURE OF L4 VERTEBRA WITH DELAYED HEALING, SUBSEQUENT ENCOUNTER: ICD-10-CM

## 2023-06-20 RX ORDER — HYDROCODONE BITARTRATE AND ACETAMINOPHEN 10; 325 MG/1; MG/1
1 TABLET ORAL EVERY 8 HOURS PRN
Qty: 75 TABLET | Refills: 0 | Status: SHIPPED | OUTPATIENT
Start: 2023-06-20 | End: 2023-07-19 | Stop reason: SDUPTHER

## 2023-07-05 LAB
ALANINE AMINOTRANSFERASE (SGPT) (U/L) IN SER/PLAS: 14 U/L (ref 10–52)
ALBUMIN (G/DL) IN SER/PLAS: 3.7 G/DL (ref 3.4–5)
ALKALINE PHOSPHATASE (U/L) IN SER/PLAS: 180 U/L (ref 33–120)
ANION GAP IN SER/PLAS: 14 MMOL/L (ref 10–20)
ASPARTATE AMINOTRANSFERASE (SGOT) (U/L) IN SER/PLAS: 19 U/L (ref 9–39)
BILIRUBIN DIRECT (MG/DL) IN SER/PLAS: 0.6 MG/DL (ref 0–0.3)
BILIRUBIN TOTAL (MG/DL) IN SER/PLAS: 1.5 MG/DL (ref 0–1.2)
CALCIUM (MG/DL) IN SER/PLAS: 9.1 MG/DL (ref 8.6–10.3)
CARBON DIOXIDE, TOTAL (MMOL/L) IN SER/PLAS: 25 MMOL/L (ref 21–32)
CHLORIDE (MMOL/L) IN SER/PLAS: 92 MMOL/L (ref 98–107)
CREATININE (MG/DL) IN SER/PLAS: 0.84 MG/DL (ref 0.5–1.3)
GFR MALE: >90 ML/MIN/1.73M2
GLUCOSE (MG/DL) IN SER/PLAS: 117 MG/DL (ref 74–99)
HEMATOCRIT (%) IN BLOOD BY AUTOMATED COUNT: 37.2 % (ref 41–52)
HEMOGLOBIN (G/DL) IN BLOOD: 13 G/DL (ref 13.5–17.5)
POTASSIUM (MMOL/L) IN SER/PLAS: 3.8 MMOL/L (ref 3.5–5.3)
PROSTATE SPECIFIC AG (NG/ML) IN SER/PLAS: 0.14 NG/ML (ref 0–4)
PROTEIN TOTAL: 6.4 G/DL (ref 6.4–8.2)
SODIUM (MMOL/L) IN SER/PLAS: 127 MMOL/L (ref 136–145)
UREA NITROGEN (MG/DL) IN SER/PLAS: 7 MG/DL (ref 6–23)

## 2023-07-10 LAB
TESTOSTERONE FREE (CHAN): 164.7 PG/ML (ref 35–155)
TESTOSTERONE,TOTAL,LC-MS/MS: 905 NG/DL (ref 250–1100)

## 2023-07-17 LAB
TESTOSTERONE FREE (CHAN): 75.8 PG/ML (ref 35–155)
TESTOSTERONE,TOTAL,LC-MS/MS: 530 NG/DL (ref 250–1100)

## 2023-07-19 DIAGNOSIS — S32.040G COMPRESSION FRACTURE OF L4 VERTEBRA WITH DELAYED HEALING, SUBSEQUENT ENCOUNTER: ICD-10-CM

## 2023-07-19 RX ORDER — HYDROCODONE BITARTRATE AND ACETAMINOPHEN 10; 325 MG/1; MG/1
1 TABLET ORAL EVERY 8 HOURS PRN
Qty: 75 TABLET | Refills: 0 | Status: SHIPPED | OUTPATIENT
Start: 2023-07-19 | End: 2023-08-14 | Stop reason: SDUPTHER

## 2023-07-21 ENCOUNTER — OFFICE VISIT (OUTPATIENT)
Dept: PRIMARY CARE | Facility: CLINIC | Age: 58
End: 2023-07-21
Payer: MEDICAID

## 2023-07-21 VITALS
DIASTOLIC BLOOD PRESSURE: 78 MMHG | HEART RATE: 90 BPM | SYSTOLIC BLOOD PRESSURE: 154 MMHG | WEIGHT: 315 LBS | BODY MASS INDEX: 45.1 KG/M2 | HEIGHT: 70 IN

## 2023-07-21 DIAGNOSIS — I10 PRIMARY HYPERTENSION: ICD-10-CM

## 2023-07-21 DIAGNOSIS — E29.1 HYPOGONADISM IN MALE: ICD-10-CM

## 2023-07-21 DIAGNOSIS — Z79.899 CONTROLLED SUBSTANCE AGREEMENT SIGNED: Primary | ICD-10-CM

## 2023-07-21 DIAGNOSIS — N52.9 ERECTILE DYSFUNCTION, UNSPECIFIED ERECTILE DYSFUNCTION TYPE: ICD-10-CM

## 2023-07-21 DIAGNOSIS — F41.9 ANXIETY: ICD-10-CM

## 2023-07-21 DIAGNOSIS — M80.80XD OTHER OSTEOPOROSIS WITH CURRENT PATHOLOGICAL FRACTURE WITH ROUTINE HEALING, SUBSEQUENT ENCOUNTER: ICD-10-CM

## 2023-07-21 DIAGNOSIS — S32.040D COMPRESSION FRACTURE OF L4 VERTEBRA WITH ROUTINE HEALING, SUBSEQUENT ENCOUNTER: ICD-10-CM

## 2023-07-21 DIAGNOSIS — R79.89 LOW TESTOSTERONE: ICD-10-CM

## 2023-07-21 DIAGNOSIS — E87.1 HYPONATREMIA: ICD-10-CM

## 2023-07-21 PROCEDURE — 80358 DRUG SCREENING METHADONE: CPT

## 2023-07-21 PROCEDURE — 80368 SEDATIVE HYPNOTICS: CPT

## 2023-07-21 PROCEDURE — 80354 DRUG SCREENING FENTANYL: CPT

## 2023-07-21 PROCEDURE — 80365 DRUG SCREENING OXYCODONE: CPT

## 2023-07-21 PROCEDURE — 3078F DIAST BP <80 MM HG: CPT | Performed by: INTERNAL MEDICINE

## 2023-07-21 PROCEDURE — 99214 OFFICE O/P EST MOD 30 MIN: CPT | Performed by: INTERNAL MEDICINE

## 2023-07-21 PROCEDURE — 80373 DRUG SCREENING TRAMADOL: CPT

## 2023-07-21 PROCEDURE — 3077F SYST BP >= 140 MM HG: CPT | Performed by: INTERNAL MEDICINE

## 2023-07-21 PROCEDURE — 80346 BENZODIAZEPINES1-12: CPT

## 2023-07-21 PROCEDURE — 80307 DRUG TEST PRSMV CHEM ANLYZR: CPT

## 2023-07-21 PROCEDURE — 80361 OPIATES 1 OR MORE: CPT

## 2023-07-21 ASSESSMENT — ENCOUNTER SYMPTOMS
FATIGUE: 0
ABDOMINAL PAIN: 0
BACK PAIN: 0
CHILLS: 0
HEADACHES: 0
ARTHRALGIAS: 0
WEAKNESS: 0
NECK PAIN: 0
RHINORRHEA: 0
DIARRHEA: 0
SORE THROAT: 0
TROUBLE SWALLOWING: 0
DIZZINESS: 0
VOMITING: 0
NUMBNESS: 0
COUGH: 0
SINUS PRESSURE: 0
EYE DISCHARGE: 0
UNEXPECTED WEIGHT CHANGE: 0
HALLUCINATIONS: 0
BLOOD IN STOOL: 0
SINUS PAIN: 0
CONSTIPATION: 0
DYSURIA: 0
FEVER: 0
NAUSEA: 0
APPETITE CHANGE: 0
WHEEZING: 0
SHORTNESS OF BREATH: 0
ACTIVITY CHANGE: 0
ABDOMINAL DISTENTION: 0
NERVOUS/ANXIOUS: 0
FREQUENCY: 0

## 2023-07-21 ASSESSMENT — LIFESTYLE VARIABLES: TOTAL SCORE: 4

## 2023-07-21 NOTE — PROGRESS NOTES
"Subjective   Patient ID: Tr Arnold is a 57 y.o. male who presents for Follow-up (3 month).  HPI  Patient is here today for 3 mo follow up  Patiebnt reports that he had blood work 2 weeks ago and he Na looks like it was back down to 127. Reports that he has forgotten to take his pills several times, has cut back on drinking his alcohol.     Review of Systems   Constitutional:  Negative for activity change, appetite change, chills, fatigue, fever and unexpected weight change.   HENT:  Negative for congestion, ear discharge, ear pain, nosebleeds, postnasal drip, rhinorrhea, sinus pressure, sinus pain, sneezing, sore throat, tinnitus and trouble swallowing.    Eyes:  Negative for discharge.   Respiratory:  Negative for cough, shortness of breath and wheezing.    Cardiovascular:  Negative for chest pain and leg swelling.   Gastrointestinal:  Negative for abdominal distention, abdominal pain, blood in stool, constipation, diarrhea, nausea and vomiting.   Endocrine: Negative for cold intolerance.   Genitourinary:  Negative for dysuria and frequency.   Musculoskeletal:  Negative for arthralgias, back pain and neck pain.   Skin:  Negative for rash.   Neurological:  Negative for dizziness, weakness, numbness and headaches.   Psychiatric/Behavioral:  Negative for hallucinations. The patient is not nervous/anxious.        Objective   /78 (BP Location: Right arm, Patient Position: Sitting, BP Cuff Size: Adult)   Pulse 90   Ht 1.778 m (5' 10\")   Wt (!) 161 kg (355 lb)   BMI 50.94 kg/m²     Physical Exam  Constitutional:       General: He is not in acute distress.     Appearance: Normal appearance.   HENT:      Head: Normocephalic.      Nose: Nose normal.      Mouth/Throat:      Mouth: Mucous membranes are dry.      Pharynx: No oropharyngeal exudate.   Eyes:      General:         Right eye: No discharge.         Left eye: No discharge.      Extraocular Movements: Extraocular movements intact.      Pupils: " Pupils are equal, round, and reactive to light.   Cardiovascular:      Rate and Rhythm: Normal rate and regular rhythm.      Heart sounds: No murmur heard.     No gallop.   Pulmonary:      Effort: Pulmonary effort is normal. No respiratory distress.      Breath sounds: Normal breath sounds. No wheezing.   Musculoskeletal:         General: Swelling present. Normal range of motion.      Right lower leg: Edema present.   Skin:     General: Skin is warm and dry.      Coloration: Skin is not jaundiced.   Neurological:      General: No focal deficit present.      Mental Status: He is alert and oriented to person, place, and time.      Cranial Nerves: No cranial nerve deficit.   Psychiatric:         Mood and Affect: Mood normal.         Behavior: Behavior normal.       OARRS:  No data recorded  I have personally reviewed the OARRS report for Tr Arnold. I have considered the risks of abuse, dependence, addiction and diversion    Is the patient prescribed a combination of a benzodiazepine and opioid?  No    Last Urine Drug Screen / ordered today: Yes  Recent Results (from the past 53965 hour(s))   OPIATE/OPIOID/BENZO PRESCRIPTION COMPLIANCE    Collection Time: 04/22/22  9:00 AM   Result Value Ref Range    DRUG SCREEN COMMENT URINE SEE BELOW     Creatine, Urine 34.5 mg/dL    Amphetamine Screen, Urine PRESUMPTIVE NEGATIVE NEGATIVE    Barbiturate Screen, Urine PRESUMPTIVE NEGATIVE NEGATIVE    Cannabinoid Screen, Urine PRESUMPTIVE NEGATIVE NEGATIVE    Cocaine Screen, Urine PRESUMPTIVE NEGATIVE NEGATIVE    PCP Screen, Urine PRESUMPTIVE NEGATIVE NEGATIVE    7-Aminoclonazepam <25 Cutoff <25 ng/mL    Alpha-Hydroxyalprazolam <25 Cutoff <25 ng/mL    Alpha-Hydroxymidazolam <25 Cutoff <25 ng/mL    Alprazolam <25 Cutoff <25 ng/mL    Chlordiazepoxide <25 Cutoff <25 ng/mL    Clonazepam <25 Cutoff <25 ng/mL    Diazepam <25 Cutoff <25 ng/mL    Lorazepam <25 Cutoff <25 ng/mL    Midazolam <25 Cutoff <25 ng/mL    Nordiazepam <25  Cutoff <25 ng/mL    Oxazepam <25 Cutoff <25 ng/mL    Temazepam <25 Cutoff <25 ng/mL    Zolpidem <25 Cutoff <25 ng/mL    Zolpidem Metabolite (ZCA) <25 Cutoff <25 ng/mL    6-Acetylmorphine <25 Cutoff <25 ng/mL    Codeine <50 Cutoff <50 ng/mL    Hydrocodone 424 (A) Cutoff <25 ng/mL    Hydromorphone 113 (A) Cutoff <25 ng/mL    Morphine Urine <50 Cutoff <50 ng/mL    Norhydrocodone 181 (A) Cutoff <25 ng/mL    Noroxycodone <25 Cutoff <25 ng/mL    Oxycodone <25 Cutoff <25 ng/mL    Oxymorphone <25 Cutoff <25 ng/mL    Tramadol <50 Cutoff <50 ng/mL    O-Desmethyltramadol <50 Cutoff <50 ng/mL    Fentanyl <2.5 Cutoff<2.5 ng/mL    Norfentanyl <2.5 Cutoff<2.5 ng/mL    METHADONE CONFIRMATION,URINE <25 Cutoff <25 ng/mL    EDDP <25 Cutoff <25 ng/mL     N/A    Controlled Substance Agreement:  Date of the Last Agreement: 07/21/2023  Reviewed Controlled Substance Agreement including but not limited to the benefits, risks, and alternatives to treatment with a Controlled Substance medication(s).    Opioids:  What is the patient's goal of therapy? Improvement of chronic pain   Is this being achieved with current treatment? yes    I have calculated the patient's Morphine Dose Equivalent (MED):   I have considered referral to Pain Management and/or a specialist, and do not feel it is necessary at this time.    I feel that it is clinically indicated to continue this current medication regimen after consideration of alternative therapies, and other non-opioid treatment.    Opioid Risk Screening:  Family History of Substance Abuse  Alcohol: 0 (7/21/2023  9:00 AM)  Illegal Drugs: 0 (7/21/2023  9:00 AM)  Prescription Drugs: 0 (7/21/2023  9:00 AM)    Personal History of Substance Abuse  Alcohol: 3 (7/21/2023  9:00 AM)  Illegal Drugs: 0 (7/21/2023  9:00 AM)  Prescription Drugs: 0 (7/21/2023  9:00 AM)    Patient Age (16-45)  Age (16-45): 0 (7/21/2023  9:00 AM)    History of Preadolescent Sexual Abuse  History of Preadolescent Sexual Abuse: .0  (7/21/2023  9:00 AM)    Psychological Disease  Attention Deficit Disorder, Obsessive Compulsive Disorder, Bipolar, Schizophrenia: 0 (7/21/2023  9:00 AM)  Depression: 1 (7/21/2023  9:00 AM)    Total Score  Total Score: 4 (7/21/2023  9:00 AM)    Total Score Risk Category  TOTAL SCORE CATEGORY: Moderate Risk (4-7) (7/21/2023  9:00 AM)        Pain Assessment:  No data recorded    Assessment/Plan   Problem List Items Addressed This Visit       Compression fracture of fourth lumbar vertebra (CMS/HCC)    Anxiety    Erectile dysfunction    Hypertension    Low testosterone    Osteoporosis    Hyponatremia    Hypogonadism in male     Other Visit Diagnoses       Controlled substance agreement signed    -  Primary    Relevant Orders    Opiate/Opioid/Benzo Extended Prescription Compliance          1. Left avascular necrosis s/p hip replacement, found to have lumbar compression fracture, was referred to Neurosurgery but because he had lost insurance was not able to pursue at that time, did eventually see Neurosurgery and recommended seeing pain management for injections, will have appt next week to further discuss  - on lyrica 75mg po tid   - CSA and UDS updated today   -continue percocet 10/325mg po tid prn   - I have personally reviewed this patient's OARRS report and found it to be appropriate. The report has been uploaded into the medical record. I have considered the risks of abuse, addiction, dependence, and diversion and feel that it is clinically appropriate for this patient to be prescribed this controlled medication.      2. Depression, stable   - continue Cymbalta 60mg po daily      3. HTN , controlled   - continue lisinopril 40mg po daily      4. Chronic venous stasis changes on maribell legs  - can continue Lasix as needed , use compression stockings     5. Gout , controlled   - continue allopurinol   - mitigare prn in times of flare      6. Hyponatremia 2/2 alcohol   - following with nephrology  - has cut back on his  alcohol  - taking na tabs        7. Osteoporosis, found to have low T, multifactorial with drinking alcohol and low t  - on testosterone replacement  - calcium and vit d   - repeat bone density in 2 years  Final diagnoses:   [Z79.899] Controlled substance agreement signed   [I10] Primary hypertension   [M80.80XD] Other osteoporosis with current pathological fracture with routine healing, subsequent encounter   [R79.89] Low testosterone   [E29.1] Hypogonadism in male   [E87.1] Hyponatremia   [N52.9] Erectile dysfunction, unspecified erectile dysfunction type   [F41.9] Anxiety   [S32.040D] Compression fracture of L4 vertebra with routine healing, subsequent encounter

## 2023-07-26 LAB
6-ACETYLMORPHINE: <25 NG/ML
7-AMINOCLONAZEPAM: <25 NG/ML
ALPHA-HYDROXYALPRAZOLAM: <25 NG/ML
ALPHA-HYDROXYMIDAZOLAM: <25 NG/ML
ALPRAZOLAM: <25 NG/ML
AMPHETAMINE (PRESENCE) IN URINE BY SCREEN METHOD: ABNORMAL
BARBITURATES PRESENCE IN URINE BY SCREEN METHOD: ABNORMAL
CANNABINOIDS IN URINE BY SCREEN METHOD: ABNORMAL
CHLORDIAZEPOXIDE: <25 NG/ML
CLONAZEPAM: <25 NG/ML
COCAINE (PRESENCE) IN URINE BY SCREEN METHOD: ABNORMAL
CODEINE: <50 NG/ML
CREATINE, URINE FOR DRUG: 17.4 MG/DL
DIAZEPAM: <25 NG/ML
DRUG SCREEN COMMENT URINE: ABNORMAL
EDDP: <25 NG/ML
FENTANYL CONFIRMATION, URINE: <2.5 NG/ML
HYDROCODONE: 1190 NG/ML
HYDROMORPHONE: 78 NG/ML
LORAZEPAM: <25 NG/ML
METHADONE CONFIRMATION,URINE: <25 NG/ML
MIDAZOLAM: <25 NG/ML
MORPHINE URINE: <50 NG/ML
NORDIAZEPAM: <25 NG/ML
NORFENTANYL: <2.5 NG/ML
NORHYDROCODONE: 281 NG/ML
NOROXYCODONE: <25 NG/ML
O-DESMETHYLTRAMADOL: <50 NG/ML
OXAZEPAM: <25 NG/ML
OXYCODONE: <25 NG/ML
OXYMORPHONE: <25 NG/ML
PHENCYCLIDINE (PRESENCE) IN URINE BY SCREEN METHOD: ABNORMAL
SPECIFIC GRAVITY, URINE DRUG: 1
TEMAZEPAM: <25 NG/ML
TRAMADOL: <50 NG/ML
ZOLPIDEM METABOLITE (ZCA): <25 NG/ML
ZOLPIDEM: <25 NG/ML

## 2023-08-14 DIAGNOSIS — S32.040G COMPRESSION FRACTURE OF L4 VERTEBRA WITH DELAYED HEALING, SUBSEQUENT ENCOUNTER: ICD-10-CM

## 2023-08-14 RX ORDER — HYDROCODONE BITARTRATE AND ACETAMINOPHEN 10; 325 MG/1; MG/1
1 TABLET ORAL EVERY 8 HOURS PRN
Qty: 75 TABLET | Refills: 0 | Status: SHIPPED | OUTPATIENT
Start: 2023-08-14 | End: 2023-09-11 | Stop reason: SDUPTHER

## 2023-09-01 LAB
ANION GAP IN SER/PLAS: 14 MMOL/L (ref 10–20)
CALCIUM (MG/DL) IN SER/PLAS: 9.4 MG/DL (ref 8.6–10.3)
CARBON DIOXIDE, TOTAL (MMOL/L) IN SER/PLAS: 24 MMOL/L (ref 21–32)
CHLORIDE (MMOL/L) IN SER/PLAS: 93 MMOL/L (ref 98–107)
CREATININE (MG/DL) IN SER/PLAS: 0.61 MG/DL (ref 0.5–1.3)
GFR MALE: >90 ML/MIN/1.73M2
GLUCOSE (MG/DL) IN SER/PLAS: 112 MG/DL (ref 74–99)
POTASSIUM (MMOL/L) IN SER/PLAS: 4.3 MMOL/L (ref 3.5–5.3)
SODIUM (MMOL/L) IN SER/PLAS: 127 MMOL/L (ref 136–145)
UREA NITROGEN (MG/DL) IN SER/PLAS: 6 MG/DL (ref 6–23)

## 2023-09-11 DIAGNOSIS — S32.040G COMPRESSION FRACTURE OF L4 VERTEBRA WITH DELAYED HEALING, SUBSEQUENT ENCOUNTER: ICD-10-CM

## 2023-09-11 RX ORDER — HYDROCODONE BITARTRATE AND ACETAMINOPHEN 10; 325 MG/1; MG/1
1 TABLET ORAL EVERY 8 HOURS PRN
Qty: 75 TABLET | Refills: 0 | Status: SHIPPED | OUTPATIENT
Start: 2023-09-11 | End: 2023-10-09 | Stop reason: SDUPTHER

## 2023-10-05 DIAGNOSIS — R79.89 LOW TESTOSTERONE IN MALE: Primary | ICD-10-CM

## 2023-10-09 DIAGNOSIS — N52.9 ERECTILE DYSFUNCTION, UNSPECIFIED ERECTILE DYSFUNCTION TYPE: Primary | ICD-10-CM

## 2023-10-09 DIAGNOSIS — S32.040G COMPRESSION FRACTURE OF L4 VERTEBRA WITH DELAYED HEALING, SUBSEQUENT ENCOUNTER: ICD-10-CM

## 2023-10-09 RX ORDER — HYDROCODONE BITARTRATE AND ACETAMINOPHEN 10; 325 MG/1; MG/1
1 TABLET ORAL EVERY 8 HOURS PRN
Qty: 75 TABLET | Refills: 0 | Status: SHIPPED | OUTPATIENT
Start: 2023-10-09 | End: 2023-11-02 | Stop reason: SDUPTHER

## 2023-10-11 RX ORDER — TESTOSTERONE CYPIONATE 200 MG/ML
INJECTION, SOLUTION INTRAMUSCULAR
Qty: 2 ML | Refills: 0 | Status: SHIPPED | OUTPATIENT
Start: 2023-10-11 | End: 2023-12-27

## 2023-10-12 RX ORDER — TESTOSTERONE CYPIONATE 200 MG/ML
INJECTION, SOLUTION INTRAMUSCULAR
Qty: 2 ML | Refills: 3 | Status: SHIPPED | OUTPATIENT
Start: 2023-10-12 | End: 2023-12-06 | Stop reason: SDUPTHER

## 2023-10-24 ENCOUNTER — OFFICE VISIT (OUTPATIENT)
Dept: PRIMARY CARE | Facility: CLINIC | Age: 58
End: 2023-10-24
Payer: MEDICAID

## 2023-10-24 VITALS
WEIGHT: 315 LBS | SYSTOLIC BLOOD PRESSURE: 128 MMHG | DIASTOLIC BLOOD PRESSURE: 76 MMHG | HEART RATE: 78 BPM | BODY MASS INDEX: 45.1 KG/M2 | HEIGHT: 70 IN

## 2023-10-24 DIAGNOSIS — E87.1 HYPONATREMIA: ICD-10-CM

## 2023-10-24 DIAGNOSIS — N52.9 ERECTILE DYSFUNCTION, UNSPECIFIED ERECTILE DYSFUNCTION TYPE: ICD-10-CM

## 2023-10-24 DIAGNOSIS — F41.9 ANXIETY: ICD-10-CM

## 2023-10-24 DIAGNOSIS — M80.80XD OTHER OSTEOPOROSIS WITH CURRENT PATHOLOGICAL FRACTURE WITH ROUTINE HEALING, SUBSEQUENT ENCOUNTER: ICD-10-CM

## 2023-10-24 DIAGNOSIS — I87.2 CHRONIC VENOUS STASIS DERMATITIS OF BOTH LOWER EXTREMITIES: ICD-10-CM

## 2023-10-24 DIAGNOSIS — R79.89 LOW TESTOSTERONE: ICD-10-CM

## 2023-10-24 DIAGNOSIS — I10 PRIMARY HYPERTENSION: Primary | ICD-10-CM

## 2023-10-24 DIAGNOSIS — E78.2 MIXED HYPERLIPIDEMIA: ICD-10-CM

## 2023-10-24 PROCEDURE — 99213 OFFICE O/P EST LOW 20 MIN: CPT | Performed by: INTERNAL MEDICINE

## 2023-10-24 PROCEDURE — 3074F SYST BP LT 130 MM HG: CPT | Performed by: INTERNAL MEDICINE

## 2023-10-24 PROCEDURE — 3078F DIAST BP <80 MM HG: CPT | Performed by: INTERNAL MEDICINE

## 2023-10-24 ASSESSMENT — ENCOUNTER SYMPTOMS
EYE DISCHARGE: 0
DYSURIA: 0
ABDOMINAL DISTENTION: 0
UNEXPECTED WEIGHT CHANGE: 0
ABDOMINAL PAIN: 0
VOMITING: 0
ACTIVITY CHANGE: 0
APPETITE CHANGE: 0
DIZZINESS: 0
FEVER: 0
ARTHRALGIAS: 0
HALLUCINATIONS: 0
HEADACHES: 0
TROUBLE SWALLOWING: 0
SHORTNESS OF BREATH: 0
FREQUENCY: 0
CONSTIPATION: 0
DIARRHEA: 0
COUGH: 0
BLOOD IN STOOL: 0
NECK PAIN: 0
NUMBNESS: 0
NAUSEA: 0
SORE THROAT: 0
NERVOUS/ANXIOUS: 0
WEAKNESS: 0
RHINORRHEA: 0
SINUS PRESSURE: 0
BACK PAIN: 0
FATIGUE: 0

## 2023-10-24 NOTE — PROGRESS NOTES
"Subjective   Patient ID: Tr Arnold is a 57 y.o. male who presents for Follow-up (3 month).  HPI  Patient is here today for 3 mo follow up   Patient reports that a few weeks ago he had sat down and he felt a big pop in his back.   He was recommended to see pain management and have injections prior to any surgery, had no insurance at the time but has not followed up.  Reports that he has not had any new worsening pain.     Review of Systems   Constitutional:  Negative for activity change, appetite change, fatigue, fever and unexpected weight change.   HENT:  Negative for congestion, ear discharge, ear pain, nosebleeds, postnasal drip, rhinorrhea, sinus pressure, sneezing, sore throat, tinnitus and trouble swallowing.    Eyes:  Negative for discharge.   Respiratory:  Negative for cough and shortness of breath.    Cardiovascular:  Negative for chest pain.   Gastrointestinal:  Negative for abdominal distention, abdominal pain, blood in stool, constipation, diarrhea, nausea and vomiting.   Endocrine: Negative for cold intolerance.   Genitourinary:  Negative for dysuria and frequency.   Musculoskeletal:  Negative for arthralgias, back pain and neck pain.   Skin:  Negative for rash.   Neurological:  Negative for dizziness, weakness, numbness and headaches.   Psychiatric/Behavioral:  Negative for hallucinations. The patient is not nervous/anxious.        Objective   /76   Pulse 78   Ht 1.778 m (5' 10\")   Wt (!) 152 kg (336 lb)   BMI 48.21 kg/m²     Physical Exam  Constitutional:       General: He is not in acute distress.     Appearance: Normal appearance.   Skin:     General: Skin is warm and dry.      Coloration: Skin is not jaundiced.   Neurological:      General: No focal deficit present.      Mental Status: He is alert and oriented to person, place, and time.      Cranial Nerves: No cranial nerve deficit.   Psychiatric:         Mood and Affect: Mood normal.         Behavior: Behavior normal. "           OARRS:  No data recorded  I have personally reviewed the OARRS report for Tr Rowelleliazarankita. I have considered the risks of abuse, dependence, addiction and diversion     Is the patient prescribed a combination of a benzodiazepine and opioid?  No     Last Urine Drug Screen / ordered today: Yes  Recent Results         Recent Results (from the past [id number] hour(s))   OPIATE/OPIOID/BENZO PRESCRIPTION COMPLIANCE     Collection Time: 04/22/22  9:00 AM   Result Value Ref Range     DRUG SCREEN COMMENT URINE SEE BELOW       Creatine, Urine 34.5 mg/dL     Amphetamine Screen, Urine PRESUMPTIVE NEGATIVE NEGATIVE     Barbiturate Screen, Urine PRESUMPTIVE NEGATIVE NEGATIVE     Cannabinoid Screen, Urine PRESUMPTIVE NEGATIVE NEGATIVE     Cocaine Screen, Urine PRESUMPTIVE NEGATIVE NEGATIVE     PCP Screen, Urine PRESUMPTIVE NEGATIVE NEGATIVE     7-Aminoclonazepam <25 Cutoff <25 ng/mL     Alpha-Hydroxyalprazolam <25 Cutoff <25 ng/mL     Alpha-Hydroxymidazolam <25 Cutoff <25 ng/mL     Alprazolam <25 Cutoff <25 ng/mL     Chlordiazepoxide <25 Cutoff <25 ng/mL     Clonazepam <25 Cutoff <25 ng/mL     Diazepam <25 Cutoff <25 ng/mL     Lorazepam <25 Cutoff <25 ng/mL     Midazolam <25 Cutoff <25 ng/mL     Nordiazepam <25 Cutoff <25 ng/mL     Oxazepam <25 Cutoff <25 ng/mL     Temazepam <25 Cutoff <25 ng/mL     Zolpidem <25 Cutoff <25 ng/mL     Zolpidem Metabolite (ZCA) <25 Cutoff <25 ng/mL     6-Acetylmorphine <25 Cutoff <25 ng/mL     Codeine <50 Cutoff <50 ng/mL     Hydrocodone 424 (A) Cutoff <25 ng/mL     Hydromorphone 113 (A) Cutoff <25 ng/mL     Morphine Urine <50 Cutoff <50 ng/mL     Norhydrocodone 181 (A) Cutoff <25 ng/mL     Noroxycodone <25 Cutoff <25 ng/mL     Oxycodone <25 Cutoff <25 ng/mL     Oxymorphone <25 Cutoff <25 ng/mL     Tramadol <50 Cutoff <50 ng/mL     O-Desmethyltramadol <50 Cutoff <50 ng/mL     Fentanyl <2.5 Cutoff<2.5 ng/mL     Norfentanyl <2.5 Cutoff<2.5 ng/mL     METHADONE CONFIRMATION,URINE <25 Cutoff <25  ng/mL     EDDP <25 Cutoff <25 ng/mL         N/A     Controlled Substance Agreement:  Date of the Last Agreement: 07/21/2023  Reviewed Controlled Substance Agreement including but not limited to the benefits, risks, and alternatives to treatment with a Controlled Substance medication(s).     Opioids:  What is the patient's goal of therapy? Improvement of chronic pain              Is this being achieved with current treatment? yes     I have calculated the patient's Morphine Dose Equivalent (MED):   I have considered referral to Pain Management and/or a specialist, and do not feel it is necessary at this time.     I feel that it is clinically indicated to continue this current medication regimen after consideration of alternative therapies, and other non-opioid treatment.     Opioid Risk Screening:  Family History of Substance Abuse  Alcohol: 0 (7/21/2023  9:00 AM)  Illegal Drugs: 0 (7/21/2023  9:00 AM)  Prescription Drugs: 0 (7/21/2023  9:00 AM)     Personal History of Substance Abuse  Alcohol: 3 (7/21/2023  9:00 AM)  Illegal Drugs: 0 (7/21/2023  9:00 AM)  Prescription Drugs: 0 (7/21/2023  9:00 AM)     Patient Age (16-45)  Age (16-45): 0 (7/21/2023  9:00 AM)     History of Preadolescent Sexual Abuse  History of Preadolescent Sexual Abuse: .0 (7/21/2023  9:00 AM)     Psychological Disease  Attention Deficit Disorder, Obsessive Compulsive Disorder, Bipolar, Schizophrenia: 0 (7/21/2023  9:00 AM)  Depression: 1 (7/21/2023  9:00 AM)     Total Score  Total Score: 4 (7/21/2023  9:00 AM)     Total Score Risk Category  TOTAL SCORE CATEGORY: Moderate Risk (4-7) (7/21/2023  9:00 AM)           Pain Assessment:  No data recorded       Assessment/Plan   Problem List Items Addressed This Visit       Anxiety    Erectile dysfunction    RESOLVED: Chronic venous stasis dermatitis of both lower extremities    Hypertension - Primary    RESOLVED: Hyperlipidemia    Low testosterone    Osteoporosis    Hyponatremia       1. Left avascular  necrosis s/p hip replacement, found to have lumbar compression fracture, was referred to Neurosurgery but because he had lost insurance was not able to pursue at that time, did eventually see Neurosurgery and recommended seeing pain management for injections, will have appt next week to further discuss  - on lyrica 75mg po tid   - CSA and UDS up to date   -continue percocet 10/325mg po tid prn   - I have personally reviewed this patient's OARRS report and found it to be appropriate. The report has been uploaded into the medical record. I have considered the risks of abuse, addiction, dependence, and diversion and feel that it is clinically appropriate for this patient to be prescribed this controlled medication.      2. Depression, stable   - continue Cymbalta 60mg po daily      3. HTN , controlled   - continue lisinopril 40mg po daily      4. Chronic venous stasis changes on maribell legs  - can continue Lasix as needed , use compression stockings     5. Gout , controlled   - continue allopurinol   - mitigare prn in times of flare      6. Hyponatremia 2/2 alcohol   - following with nephrology, a mo ago Na was 127  - has cut back on his alcohol  - taking na tabs        7. Osteoporosis, found to have low T, multifactorial with drinking alcohol and low t  - on testosterone replacement  - calcium and vit d   - repeat bone density in 2 years

## 2023-11-02 DIAGNOSIS — S32.040G COMPRESSION FRACTURE OF L4 VERTEBRA WITH DELAYED HEALING, SUBSEQUENT ENCOUNTER: ICD-10-CM

## 2023-11-02 RX ORDER — HYDROCODONE BITARTRATE AND ACETAMINOPHEN 10; 325 MG/1; MG/1
1 TABLET ORAL EVERY 8 HOURS PRN
Qty: 75 TABLET | Refills: 0 | Status: SHIPPED | OUTPATIENT
Start: 2023-11-02 | End: 2023-11-28 | Stop reason: SDUPTHER

## 2023-11-17 ENCOUNTER — TELEPHONE (OUTPATIENT)
Dept: PAIN MEDICINE | Facility: CLINIC | Age: 58
End: 2023-11-17
Payer: MEDICAID

## 2023-11-17 DIAGNOSIS — G89.29 CHRONIC BILATERAL LOW BACK PAIN, UNSPECIFIED WHETHER SCIATICA PRESENT: Primary | ICD-10-CM

## 2023-11-17 DIAGNOSIS — M54.50 CHRONIC BILATERAL LOW BACK PAIN, UNSPECIFIED WHETHER SCIATICA PRESENT: Primary | ICD-10-CM

## 2023-11-28 ENCOUNTER — HOSPITAL ENCOUNTER (OUTPATIENT)
Dept: RADIOLOGY | Facility: HOSPITAL | Age: 58
Discharge: HOME | End: 2023-11-28
Payer: MEDICAID

## 2023-11-28 DIAGNOSIS — G89.29 CHRONIC BILATERAL LOW BACK PAIN, UNSPECIFIED WHETHER SCIATICA PRESENT: ICD-10-CM

## 2023-11-28 DIAGNOSIS — M54.50 CHRONIC BILATERAL LOW BACK PAIN, UNSPECIFIED WHETHER SCIATICA PRESENT: ICD-10-CM

## 2023-11-28 DIAGNOSIS — S32.040G COMPRESSION FRACTURE OF L4 VERTEBRA WITH DELAYED HEALING, SUBSEQUENT ENCOUNTER: ICD-10-CM

## 2023-11-28 PROCEDURE — 72100 X-RAY EXAM L-S SPINE 2/3 VWS: CPT | Performed by: RADIOLOGY

## 2023-11-28 PROCEDURE — 72100 X-RAY EXAM L-S SPINE 2/3 VWS: CPT

## 2023-11-28 RX ORDER — HYDROCODONE BITARTRATE AND ACETAMINOPHEN 10; 325 MG/1; MG/1
1 TABLET ORAL EVERY 8 HOURS PRN
Qty: 75 TABLET | Refills: 0 | Status: SHIPPED | OUTPATIENT
Start: 2023-11-28 | End: 2023-12-21 | Stop reason: SDUPTHER

## 2023-11-30 DIAGNOSIS — E87.1 HYPONATREMIA: ICD-10-CM

## 2023-12-01 ENCOUNTER — LAB (OUTPATIENT)
Dept: LAB | Facility: LAB | Age: 58
End: 2023-12-01
Payer: MEDICAID

## 2023-12-01 DIAGNOSIS — E87.1 HYPONATREMIA: ICD-10-CM

## 2023-12-01 LAB
ANION GAP SERPL CALC-SCNC: 14 MMOL/L (ref 10–20)
BUN SERPL-MCNC: 7 MG/DL (ref 6–23)
CALCIUM SERPL-MCNC: 8.7 MG/DL (ref 8.6–10.3)
CHLORIDE SERPL-SCNC: 92 MMOL/L (ref 98–107)
CO2 SERPL-SCNC: 22 MMOL/L (ref 21–32)
CREAT SERPL-MCNC: 0.69 MG/DL (ref 0.5–1.3)
GFR SERPL CREATININE-BSD FRML MDRD: >90 ML/MIN/1.73M*2
GLUCOSE SERPL-MCNC: 93 MG/DL (ref 74–99)
POTASSIUM SERPL-SCNC: 4.2 MMOL/L (ref 3.5–5.3)
SODIUM SERPL-SCNC: 124 MMOL/L (ref 136–145)

## 2023-12-01 PROCEDURE — 36415 COLL VENOUS BLD VENIPUNCTURE: CPT

## 2023-12-01 PROCEDURE — 80048 BASIC METABOLIC PNL TOTAL CA: CPT

## 2023-12-06 ENCOUNTER — OFFICE VISIT (OUTPATIENT)
Dept: NEPHROLOGY | Facility: CLINIC | Age: 58
End: 2023-12-06
Payer: MEDICAID

## 2023-12-06 VITALS
BODY MASS INDEX: 45.1 KG/M2 | WEIGHT: 315 LBS | SYSTOLIC BLOOD PRESSURE: 132 MMHG | DIASTOLIC BLOOD PRESSURE: 78 MMHG | HEART RATE: 83 BPM | HEIGHT: 70 IN

## 2023-12-06 DIAGNOSIS — E87.1 HYPONATREMIA: Primary | ICD-10-CM

## 2023-12-06 DIAGNOSIS — F10.10 ALCOHOL ABUSE: ICD-10-CM

## 2023-12-06 DIAGNOSIS — I10 PRIMARY HYPERTENSION: ICD-10-CM

## 2023-12-06 PROCEDURE — 3075F SYST BP GE 130 - 139MM HG: CPT | Performed by: INTERNAL MEDICINE

## 2023-12-06 PROCEDURE — 3078F DIAST BP <80 MM HG: CPT | Performed by: INTERNAL MEDICINE

## 2023-12-06 PROCEDURE — 99214 OFFICE O/P EST MOD 30 MIN: CPT | Performed by: INTERNAL MEDICINE

## 2023-12-06 RX ORDER — FUROSEMIDE 40 MG/1
40 TABLET ORAL 3 TIMES DAILY
Qty: 270 TABLET | Refills: 3 | Status: SHIPPED | OUTPATIENT
Start: 2023-12-06 | End: 2024-12-05

## 2023-12-06 RX ORDER — SODIUM CHLORIDE 1 G/1
3 TABLET ORAL 3 TIMES DAILY
Qty: 810 TABLET | Refills: 3 | Status: SHIPPED | OUTPATIENT
Start: 2023-12-06 | End: 2024-05-15 | Stop reason: SDUPTHER

## 2023-12-06 NOTE — PROGRESS NOTES
Subjective   His ETOH intake went up  Said he has increased with his brother being in the hosptial  Urinates a lot  No other complaints  Drinking 15-16 beers per day    Patient ID: Tr Arnold is a 58 y.o. male who presents for Follow-up (3 month ck/Review labs).  HPI  He is here for follow-up secondary to hyponatremia with heavy alcohol use.  Blood work was obtained on December 1  His sodium is low once again.  His sodium is 124 chloride is 92 BUN 7 with a creatinine 1.69  Medications are reviewed he is on allopurinol calcium Lasix lisinopril salt tablets  Review of Systems   Cardiovascular:  Positive for leg swelling.   All other systems reviewed and are negative.      Objective   Physical Exam  Constitutional:       Appearance: Normal appearance.   HENT:      Head: Normocephalic and atraumatic.      Right Ear: External ear normal.      Left Ear: External ear normal.      Nose: Nose normal.      Mouth/Throat:      Mouth: Mucous membranes are moist.      Pharynx: Oropharynx is clear.   Eyes:      Extraocular Movements: Extraocular movements intact.      Conjunctiva/sclera: Conjunctivae normal.      Pupils: Pupils are equal, round, and reactive to light.   Cardiovascular:      Rate and Rhythm: Normal rate and regular rhythm.   Pulmonary:      Effort: Pulmonary effort is normal.      Breath sounds: Normal breath sounds.   Abdominal:      General: Abdomen is flat.      Palpations: Abdomen is soft.   Musculoskeletal:         General: Swelling present.      Right lower leg: Edema present.      Left lower leg: Edema present.   Skin:     General: Skin is warm and dry.      Comments: Chronic venous stasis of B/L LE   Neurological:      General: No focal deficit present.      Mental Status: He is alert and oriented to person, place, and time.   Psychiatric:         Mood and Affect: Mood normal.         Behavior: Behavior normal.         Assessment/Plan   Problem List Items Addressed This Visit             ICD-10-CM     Hypertension I10     Well-controlled on lisinopril         Hyponatremia - Primary E87.1     Sodium back down to 124 despite treatment  Drinking 15-16 beers per day  Aware this is gonna be very difficult  with ETOH intake  Increase Salt tabs to 3 g TID and Lasix to TID         Relevant Medications    sodium chloride 1,000 mg tablet    furosemide (Lasix) 40 mg tablet    Other Relevant Orders    Follow Up In Nephrology    Comprehensive metabolic panel    Alcohol abuse F10.10        Hyponatremia: Mixed picture  Heavy alcohol abuse  Obesity  Peripheral edema with volume overload  Chronic venous stasis of bilateral lower extremities  Chronic back pain  Hypertension  Low osmolar state     Francisco Tomlin DO 12/06/23 11:08 AM

## 2023-12-06 NOTE — ASSESSMENT & PLAN NOTE
Sodium back down to 124 despite treatment  Drinking 15-16 beers per day  Aware this is gonna be very difficult  with ETOH intake  Increase Salt tabs to 3 g TID and Lasix to TID

## 2023-12-21 ENCOUNTER — OFFICE VISIT (OUTPATIENT)
Dept: PAIN MEDICINE | Facility: CLINIC | Age: 58
End: 2023-12-21
Payer: MEDICAID

## 2023-12-21 VITALS
BODY MASS INDEX: 51.37 KG/M2 | SYSTOLIC BLOOD PRESSURE: 152 MMHG | WEIGHT: 315 LBS | RESPIRATION RATE: 20 BRPM | HEART RATE: 90 BPM | DIASTOLIC BLOOD PRESSURE: 79 MMHG

## 2023-12-21 DIAGNOSIS — F10.10 ALCOHOL ABUSE: ICD-10-CM

## 2023-12-21 DIAGNOSIS — S32.040D COMPRESSION FRACTURE OF L4 VERTEBRA WITH ROUTINE HEALING, SUBSEQUENT ENCOUNTER: Primary | ICD-10-CM

## 2023-12-21 DIAGNOSIS — S32.040G COMPRESSION FRACTURE OF L4 VERTEBRA WITH DELAYED HEALING, SUBSEQUENT ENCOUNTER: ICD-10-CM

## 2023-12-21 DIAGNOSIS — G62.1 NEUROPATHY, ALCOHOLIC (MULTI): ICD-10-CM

## 2023-12-21 DIAGNOSIS — N52.9 ERECTILE DYSFUNCTION, UNSPECIFIED ERECTILE DYSFUNCTION TYPE: ICD-10-CM

## 2023-12-21 PROCEDURE — 99213 OFFICE O/P EST LOW 20 MIN: CPT | Performed by: PHYSICIAN ASSISTANT

## 2023-12-21 RX ORDER — HYDROCODONE BITARTRATE AND ACETAMINOPHEN 10; 325 MG/1; MG/1
1 TABLET ORAL EVERY 8 HOURS PRN
Qty: 75 TABLET | Refills: 0 | Status: SHIPPED | OUTPATIENT
Start: 2023-12-21 | End: 2024-01-22 | Stop reason: SDUPTHER

## 2023-12-21 ASSESSMENT — COLUMBIA-SUICIDE SEVERITY RATING SCALE - C-SSRS
2. HAVE YOU ACTUALLY HAD ANY THOUGHTS OF KILLING YOURSELF?: NO
1. IN THE PAST MONTH, HAVE YOU WISHED YOU WERE DEAD OR WISHED YOU COULD GO TO SLEEP AND NOT WAKE UP?: NO
6. HAVE YOU EVER DONE ANYTHING, STARTED TO DO ANYTHING, OR PREPARED TO DO ANYTHING TO END YOUR LIFE?: NO

## 2023-12-21 ASSESSMENT — PAIN SCALES - GENERAL: PAINLEVEL: 10-WORST PAIN EVER

## 2023-12-21 ASSESSMENT — ENCOUNTER SYMPTOMS
NUMBNESS: 1
ENDOCRINE NEGATIVE: 1
GASTROINTESTINAL NEGATIVE: 1
EYES NEGATIVE: 1
WEAKNESS: 1
ARTHRALGIAS: 1
BACK PAIN: 1
ALLERGIC/IMMUNOLOGIC NEGATIVE: 1
RESPIRATORY NEGATIVE: 1
CONSTITUTIONAL NEGATIVE: 1
CARDIOVASCULAR NEGATIVE: 1
PSYCHIATRIC NEGATIVE: 1

## 2023-12-21 NOTE — PROGRESS NOTES
Subjective   Patient ID: Tr Arnold is a 58 y.o. male who presents for Back Pain (FUV Midline lower back pain goes into bilat hips and bilat legs that hurts when he is up and moving at times his bilat legs will twitch when he is sitting, rates pain 9-10/10, describes as aching, tightness, spasms. He takes Norco and uses heating pad this helps his pain.  )he has trouble lifting his legs to get into a vehicle. He reports the pain affects his ADLs including lifting, bending, sitting, sleeping, traveling, standing for 10 minutes and walking with a cane due to balance issues for 100 yards all   Causes increased pain. LAZARO score 41% Alcohol positive education provided     Patient is a 58-year-old male. He presents today after a 9-month hiatus.  He states that he has cut back though.  He told me during intake he drink 2-3 beers a night and on the weekends 10.  He told nurses usually 5 at night and 10-15 on the weekends.  He states that he has cut back though.  He admits to lower back pain with leg pain.  He did have the EMG.  We have the results.  He is given [place] by his PCP.  He states that it does help him.  He wonders if there is anything else he can do to help with some of his pain.  He rates his discomfort a 9/10. He has difficulty walking, standing, bending, twisting, turning, and doing anything because of this pain.  This affects his quality of life.  This affects his activities and affects his ability to do the things he wants to do.        Review of Systems   Constitutional: Negative.    HENT: Negative.     Eyes: Negative.    Respiratory: Negative.     Cardiovascular: Negative.    Gastrointestinal: Negative.    Endocrine: Negative.    Genitourinary: Negative.    Musculoskeletal:  Positive for arthralgias, back pain and gait problem.   Skin: Negative.    Allergic/Immunologic: Negative.    Neurological:  Positive for weakness and numbness.   Psychiatric/Behavioral: Negative.         Objective   Physical  Exam  Vitals and nursing note reviewed.   Constitutional:       Appearance: Normal appearance. He is obese.   HENT:      Head: Normocephalic and atraumatic.      Right Ear: External ear normal.      Left Ear: External ear normal.      Nose: Nose normal.      Mouth/Throat:      Mouth: Mucous membranes are moist.      Pharynx: Oropharynx is clear.   Eyes:      Conjunctiva/sclera: Conjunctivae normal.      Pupils: Pupils are equal, round, and reactive to light.   Cardiovascular:      Rate and Rhythm: Normal rate and regular rhythm.      Pulses: Normal pulses.   Pulmonary:      Effort: Pulmonary effort is normal.   Musculoskeletal:         General: No swelling. Normal range of motion.      Cervical back: Normal range of motion.      Right lower leg: Edema present.      Left lower leg: Edema present.      Comments: 5/5 strength    Skin:     General: Skin is warm and dry.   Neurological:      General: No focal deficit present.      Mental Status: He is alert and oriented to person, place, and time. Mental status is at baseline.   Psychiatric:         Mood and Affect: Mood normal.         Behavior: Behavior normal.         Thought Content: Thought content normal.         Judgment: Judgment normal.         XR lumbar spine 2-3 views  Status: Final result     PACS Images     Show images for XR lumbar spine 2-3 views  Signed by    Signed Time Phone Pager   Eliseo Tubbs MD 11/28/2023 14:30 [phone]     Exam Information    Status Exam Begun Exam Ended   Final 11/28/2023 10:15 11/28/2023 10:36     Study Result    Narrative & Impression   Interpreted By:  Eliseo Tubbs,   STUDY:  XR LUMBAR SPINE 2-3 VIEWS; ;  11/28/2023 10:36 am      INDICATION:  Signs/Symptoms:M54.50.      COMPARISON:  07/25/2022      ACCESSION NUMBER(S):  [id number]      ORDERING CLINICIAN:  AIDEN HART      FINDINGS:  LUMBAR SPINE-AP, LATERAL AND L5-S1 SPOT LATERAL VIEWS  A slight levoscoliosis of the lumbar spine is present. The  pedicles  and posterior elements are intact. A severe compression of the L4  vertebra is seen unchanged from 07/25/2022. Degenerative changes are  present at L2-3 with mild osteophyte formation. Degenerative disc  disease changes are also present at L3-4 and L4-5 levels      IMPRESSION:  Chronic severe compression fracture of L4 vertebra.  Mild multilevel degenerative disc disease changes. No acute change  from 07/25/2022.          MACRO:  None      Signed by: Eliseo Tubbs 11/28/2023 2:30 PM  Dictation workstation:   [id number]     Assessment/Plan   Diagnoses and all orders for this visit:  Compression fracture of L4 vertebra with routine healing, subsequent encounter  Alcohol abuse  Neuropathy, alcoholic (CMS/HCC)       Patient is a 58-year-old male with a past medical history significant for chronic lower back pain, previous lumbar compression fracture. Bilateral lower extremity numbness-polyneuropathy on EMG.  I suspect that this is from alcohol intake.  He is still drinking alcohol.  We had a long distraught his alcohol intake.  At this time, we discussed the possibility of gabapentin versus Lyrica but at this time, I am not comfortable giving him this in conjunction with the alcohol that he is still admittedly drinking every night and the other medications that he is on.  We discussed the possibility of these medications to be used in the future if he can abstain from alcohol.  Patient voiced that he would try to do this.  I recommended he follow-up in 1 month for reevaluation and discussion of options depending on his alcohol intake.  OARRS reviewed.  Evelin Donovan RN 12/21/23 12:09 PM

## 2023-12-27 DIAGNOSIS — E29.1 HYPOGONADISM IN MALE: ICD-10-CM

## 2023-12-27 DIAGNOSIS — R35.1 NOCTURIA: ICD-10-CM

## 2023-12-27 RX ORDER — TESTOSTERONE CYPIONATE 200 MG/ML
INJECTION, SOLUTION INTRAMUSCULAR
Qty: 2 ML | Refills: 2 | Status: SHIPPED | OUTPATIENT
Start: 2023-12-27 | End: 2024-03-13 | Stop reason: SDUPTHER

## 2023-12-30 DIAGNOSIS — M1A.09X0 CHRONIC GOUT OF MULTIPLE SITES, UNSPECIFIED CAUSE: ICD-10-CM

## 2024-01-02 RX ORDER — ALLOPURINOL 300 MG/1
TABLET ORAL
Qty: 90 TABLET | Refills: 2 | Status: SHIPPED | OUTPATIENT
Start: 2024-01-02

## 2024-01-12 NOTE — PROGRESS NOTES
Patient presents to the office today for a 6 MO F/U w/Labs...Pt has been given himself 2ml q month around the 20th/month which has seemed to help sx.. Patient has a Hx of hypogonadism. .Most recent labs were (07/05/2023), PSA was 0.14, HCT was 37.2, HGB was 13.0 , Testosterone level was 905..PCP did order recent labs 2/23 and T was 78. Chronic BPH sx are moderate. some urgency and frequency. Pt is on a diuretic which does worsen sx..weak stream at times.. No dysuria. No hematuria. Nocturia 2-3x. pt does see Dr. Tomlin for hyponatremia. Patient has 3 children. chronic back problems.

## 2024-01-15 ENCOUNTER — APPOINTMENT (OUTPATIENT)
Dept: UROLOGY | Facility: CLINIC | Age: 59
End: 2024-01-15
Payer: MEDICAID

## 2024-01-16 NOTE — PROGRESS NOTES
Patient presents virtually today for a 6 MO F/U with Labs...Pt has been given himself 2ml q month around the 20th/month which has seemed to help sx.. Patient has a Hx of hypogonadism. .Most recent labs were (07/05/2023), PSA was 0.14, HCT was 37.2, HGB was 13.0 , Testosterone level was 905..PCP did order recent labs 2/23 and T was 78. Chronic BPH sx are moderate. some urgency and frequency. Pt is on a diuretic which does worsen sx..weak stream at times.. No dysuria. No hematuria. Nocturia 2-3x. pt does see Dr. Tomlin for hyponatremia. Patient has 3 children. chronic back problems.

## 2024-01-18 ENCOUNTER — LAB (OUTPATIENT)
Dept: LAB | Facility: LAB | Age: 59
End: 2024-01-18
Payer: MEDICAID

## 2024-01-18 ENCOUNTER — TELEPHONE (OUTPATIENT)
Dept: PAIN MEDICINE | Facility: CLINIC | Age: 59
End: 2024-01-18

## 2024-01-18 DIAGNOSIS — R79.89 LOW TESTOSTERONE: ICD-10-CM

## 2024-01-18 DIAGNOSIS — R35.1 NOCTURIA: ICD-10-CM

## 2024-01-18 DIAGNOSIS — E29.1 HYPOGONADISM IN MALE: ICD-10-CM

## 2024-01-18 LAB
HCT VFR BLD AUTO: 32.3 % (ref 41–52)
HGB BLD-MCNC: 10.9 G/DL (ref 13.5–17.5)
PSA SERPL-MCNC: 0.05 NG/ML
TESTOST SERPL-MCNC: 113 NG/DL (ref 240–1000)

## 2024-01-18 PROCEDURE — 84403 ASSAY OF TOTAL TESTOSTERONE: CPT

## 2024-01-18 PROCEDURE — 85018 HEMOGLOBIN: CPT

## 2024-01-18 PROCEDURE — 36415 COLL VENOUS BLD VENIPUNCTURE: CPT

## 2024-01-18 PROCEDURE — 85014 HEMATOCRIT: CPT

## 2024-01-18 PROCEDURE — 84153 ASSAY OF PSA TOTAL: CPT

## 2024-01-22 ENCOUNTER — OFFICE VISIT (OUTPATIENT)
Dept: UROLOGY | Facility: CLINIC | Age: 59
End: 2024-01-22
Payer: MEDICAID

## 2024-01-22 DIAGNOSIS — I10 PRIMARY HYPERTENSION: ICD-10-CM

## 2024-01-22 DIAGNOSIS — S32.040G COMPRESSION FRACTURE OF L4 VERTEBRA WITH DELAYED HEALING, SUBSEQUENT ENCOUNTER: ICD-10-CM

## 2024-01-22 DIAGNOSIS — E29.1 HYPOGONADISM IN MALE: Primary | ICD-10-CM

## 2024-01-22 PROCEDURE — 99214 OFFICE O/P EST MOD 30 MIN: CPT | Performed by: STUDENT IN AN ORGANIZED HEALTH CARE EDUCATION/TRAINING PROGRAM

## 2024-01-22 NOTE — PROGRESS NOTES
Subjective   Patient ID: Tr Arnold is a 58 y.o. male    HPI  58 y.o. male who presents today for a 6-month follow-up with labs. The patient has been self-administering 2ml of testosterone replacement therapy monthly, around the 20th of each month, which has provided symptomatic relief. He has a history of hypogonadism, and his most recent labs from July 5, 2023, showed a PSA level of 0.14, hematocrit (HCT) at 37.2, hemoglobin (HGB) at 13.0, and a testosterone level of 905. However, a subsequent lab test on February 23 indicated a testosterone level of 78. The patient also reports moderate chronic symptoms of benign prostatic hyperplasia (BPH), including some urgency and frequency, exacerbated by diuretic use, and occasionally experiences a weak urinary stream. He denies experiencing dysuria or hematuria but does report nocturia occurring 2-3 times nightly. Additionally, the patient is under the care of Dr. Tomlin for hyponatremia management. He has three children and suffers from chronic back problems.    Review of Systems    All systems were reviewed. Anything negative was noted in the HPI.    Objective   Physical Exam    General: Well developed, well nourished, alert and cooperative, appears in no acute distress   Eyes: Non-injected conjunctiva, sclera clear, no proptosis   Cardiac: Extremities are warm and well perfused. No edema, cyanosis or pallor   Lungs: Breathing is easy, non-labored. Speaking in clear and complete sentences. Normal diaphragmatic movement   MSK: Ambulatory with steady gait, unassisted   Neuro: Alert and oriented to person, place, and time   Psych: Demonstrates good judgment and reason, without hallucinations, abnormal affect or abnormal behaviors   Skin: No obvious lesions, no rashes       No CVA tenderness bilaterally   No suprapubic pain or discomfort       Past Medical History:   Diagnosis Date    Acute lumbosacral myofascial strain 02/09/2023    Acute sinus infection 02/09/2023     Arthritis 02/09/2023    Chronic venous stasis dermatitis of both lower extremities 02/09/2023    Contact dermatitis 02/09/2023    Depression, major, in remission (CMS/HCC) 02/09/2023    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 male who presents for 6-month follow-up, We had a long and extensive discussion with the patient regarding hypogonadism I explained to him that since the patient had to low level below the reference range of testosterone that he might have hypogonadism. We had an extensive explanation about the pathophysiology, risk factor, differential diagnosis freezing and management of hypogonadism. We discussed testosterone replacement therapy I explained at length to the patient the risks of testosterone replacement therapy. We discussed the correlation of testosterone replacement therapy with prostate cancer explained to him that he might be at high risk of detecting prostate cancer and this is need to follow-up closely his PSA and always make sure that is not increasing or having a high doubling time. Patient does not have any family history of prostate cancer. We also discussed cardiac events with TRT including MRIs, heart failure, PEs. Discussed with the patient that many studies so far reporting a small high risk of cardiac events with TRT. We discussed the black box warning of TRT. Also  discussed the risks of polycythemia and the increased risk for stroke in case his hematocrit was above 52. Patient verbalized understanding of all the risk and benefit of this on physical therapy and he wants to proceed. We discussed the different formulation including testosterone gel, testosterone transdermal patches, IM injection and oral testosterone. Informed him that oral testosterone is a relatively new medication on the market. We discussed Jatenzo as an oral formulation of testosterone. Explained all the risk benefit potential complication of the medication and the black box warning of hypertension. However I explained to the patient that his result of total testosterone is low normal and not deficient. Explained to him that he needs to repeat his total testosterone level and observe the trend of the value. If the value is still trending downwards below the reference range 10 he will be considered for TRT treatment. However if not the risk might outweigh the benefit.     Plan:  - Increase Testosterone to 2 ml per month  - Follow up after 4 months to re-evaluate testosterone levels and assess the response                  Scribe Attestation  By signing my name below, ITerry Scribe   attest that this documentation has been prepared under the direction and in the presence of Dr. Stephen Isaac

## 2024-01-23 ENCOUNTER — APPOINTMENT (OUTPATIENT)
Dept: PAIN MEDICINE | Facility: CLINIC | Age: 59
End: 2024-01-23
Payer: MEDICAID

## 2024-01-23 RX ORDER — HYDROCODONE BITARTRATE AND ACETAMINOPHEN 10; 325 MG/1; MG/1
1 TABLET ORAL EVERY 8 HOURS PRN
Qty: 75 TABLET | Refills: 0 | Status: SHIPPED | OUTPATIENT
Start: 2024-01-23 | End: 2024-02-20 | Stop reason: SDUPTHER

## 2024-01-24 ENCOUNTER — OFFICE VISIT (OUTPATIENT)
Dept: PRIMARY CARE | Facility: CLINIC | Age: 59
End: 2024-01-24
Payer: MEDICAID

## 2024-01-24 VITALS
SYSTOLIC BLOOD PRESSURE: 130 MMHG | WEIGHT: 315 LBS | BODY MASS INDEX: 45.1 KG/M2 | HEART RATE: 76 BPM | DIASTOLIC BLOOD PRESSURE: 75 MMHG | HEIGHT: 70 IN

## 2024-01-24 DIAGNOSIS — S32.040D COMPRESSION FRACTURE OF L4 VERTEBRA WITH ROUTINE HEALING, SUBSEQUENT ENCOUNTER: ICD-10-CM

## 2024-01-24 DIAGNOSIS — F10.10 ALCOHOL ABUSE: ICD-10-CM

## 2024-01-24 DIAGNOSIS — E78.2 MIXED HYPERLIPIDEMIA: ICD-10-CM

## 2024-01-24 DIAGNOSIS — I87.2 CHRONIC VENOUS STASIS DERMATITIS OF BOTH LOWER EXTREMITIES: ICD-10-CM

## 2024-01-24 DIAGNOSIS — I10 PRIMARY HYPERTENSION: Primary | ICD-10-CM

## 2024-01-24 DIAGNOSIS — E87.1 HYPONATREMIA: ICD-10-CM

## 2024-01-24 DIAGNOSIS — M80.80XD OTHER OSTEOPOROSIS WITH CURRENT PATHOLOGICAL FRACTURE WITH ROUTINE HEALING, SUBSEQUENT ENCOUNTER: ICD-10-CM

## 2024-01-24 DIAGNOSIS — N52.9 ERECTILE DYSFUNCTION, UNSPECIFIED ERECTILE DYSFUNCTION TYPE: ICD-10-CM

## 2024-01-24 DIAGNOSIS — G62.1 NEUROPATHY, ALCOHOLIC (MULTI): ICD-10-CM

## 2024-01-24 DIAGNOSIS — J01.10 ACUTE NON-RECURRENT FRONTAL SINUSITIS: ICD-10-CM

## 2024-01-24 DIAGNOSIS — M46.1 SACROILIITIS (CMS-HCC): ICD-10-CM

## 2024-01-24 PROBLEM — E11.9 TYPE 2 DIABETES MELLITUS WITHOUT COMPLICATION, WITHOUT LONG-TERM CURRENT USE OF INSULIN (MULTI): Status: ACTIVE | Noted: 2024-01-24

## 2024-01-24 PROBLEM — S32.040A COMPRESSION FRACTURE OF FOURTH LUMBAR VERTEBRA (MULTI): Status: RESOLVED | Noted: 2023-02-09 | Resolved: 2024-01-24

## 2024-01-24 PROBLEM — E11.9 TYPE 2 DIABETES MELLITUS WITHOUT COMPLICATION, WITHOUT LONG-TERM CURRENT USE OF INSULIN (MULTI): Status: RESOLVED | Noted: 2024-01-24 | Resolved: 2024-01-24

## 2024-01-24 PROCEDURE — 99214 OFFICE O/P EST MOD 30 MIN: CPT | Performed by: INTERNAL MEDICINE

## 2024-01-24 PROCEDURE — 3075F SYST BP GE 130 - 139MM HG: CPT | Performed by: INTERNAL MEDICINE

## 2024-01-24 PROCEDURE — 3078F DIAST BP <80 MM HG: CPT | Performed by: INTERNAL MEDICINE

## 2024-01-24 RX ORDER — AMOXICILLIN AND CLAVULANATE POTASSIUM 875; 125 MG/1; MG/1
875 TABLET, FILM COATED ORAL 2 TIMES DAILY
Qty: 20 TABLET | Refills: 0 | Status: SHIPPED | OUTPATIENT
Start: 2024-01-24 | End: 2024-02-03

## 2024-01-24 ASSESSMENT — ENCOUNTER SYMPTOMS
SHORTNESS OF BREATH: 0
DIARRHEA: 0
APPETITE CHANGE: 0
COUGH: 1
SINUS PRESSURE: 0
NAUSEA: 0
WEAKNESS: 0
VOMITING: 0
SINUS PAIN: 0
WHEEZING: 0
ACTIVITY CHANGE: 0
NUMBNESS: 0
ABDOMINAL DISTENTION: 0
SORE THROAT: 0
BACK PAIN: 0
FATIGUE: 0
CHILLS: 0

## 2024-01-24 NOTE — PROGRESS NOTES
"Subjective   Patient ID: Tr Arnold is a 58 y.o. male who presents for Follow-up (3 month) and Cough (+Congestion/Present for a few days ).  Cough  Pertinent negatives include no chest pain, chills, postnasal drip, sore throat, shortness of breath or wheezing.     Patient is here today for 3 mo follow up   Patinet reports cough and congestion x 3-4 days.   Patient denies sick contacts.   Pt denies fevers, - nausea, vomiting or diarrhea.    Review of Systems   Constitutional:  Negative for activity change, appetite change, chills and fatigue.   HENT:  Positive for congestion. Negative for postnasal drip, sinus pressure, sinus pain and sore throat.    Respiratory:  Positive for cough. Negative for shortness of breath and wheezing.    Cardiovascular:  Negative for chest pain and leg swelling.   Gastrointestinal:  Negative for abdominal distention, diarrhea, nausea and vomiting.   Musculoskeletal:  Negative for back pain.   Neurological:  Negative for weakness and numbness.       Objective   /75   Pulse 76   Ht 1.778 m (5' 10\")   Wt (!) 152 kg (335 lb)   BMI 48.07 kg/m²     Physical Exam  Constitutional:       General: He is not in acute distress.     Appearance: Normal appearance.   HENT:      Head: Normocephalic.      Right Ear: Tympanic membrane, ear canal and external ear normal.      Left Ear: Tympanic membrane and ear canal normal.      Nose: Rhinorrhea present.      Mouth/Throat:      Mouth: Mucous membranes are moist.      Pharynx: Posterior oropharyngeal erythema present. No oropharyngeal exudate.   Eyes:      General:         Right eye: No discharge.         Left eye: No discharge.      Extraocular Movements: Extraocular movements intact.      Pupils: Pupils are equal, round, and reactive to light.   Cardiovascular:      Rate and Rhythm: Normal rate and regular rhythm.      Heart sounds: No murmur heard.     No gallop.   Pulmonary:      Effort: Pulmonary effort is normal. No respiratory " distress.      Breath sounds: Normal breath sounds. No wheezing.   Musculoskeletal:         General: No swelling. Normal range of motion.   Skin:     General: Skin is warm and dry.      Coloration: Skin is not jaundiced.   Neurological:      General: No focal deficit present.      Mental Status: He is alert and oriented to person, place, and time.      Cranial Nerves: No cranial nerve deficit.   Psychiatric:         Mood and Affect: Mood normal.         Behavior: Behavior normal.       OARRS:  No data recorded  I have personally reviewed the OARRS report for rT Arnold. I have considered the risks of abuse, dependence, addiction and diversion     Is the patient prescribed a combination of a benzodiazepine and opioid?  No     Last Urine Drug Screen / ordered today: Yes  Recent Results             Recent Results (from the past 45986 hour(s))   OPIATE/OPIOID/BENZO PRESCRIPTION COMPLIANCE     Collection Time: 04/22/22  9:00 AM   Result Value Ref Range     DRUG SCREEN COMMENT URINE SEE BELOW       Creatine, Urine 34.5 mg/dL     Amphetamine Screen, Urine PRESUMPTIVE NEGATIVE NEGATIVE     Barbiturate Screen, Urine PRESUMPTIVE NEGATIVE NEGATIVE     Cannabinoid Screen, Urine PRESUMPTIVE NEGATIVE NEGATIVE     Cocaine Screen, Urine PRESUMPTIVE NEGATIVE NEGATIVE     PCP Screen, Urine PRESUMPTIVE NEGATIVE NEGATIVE     7-Aminoclonazepam <25 Cutoff <25 ng/mL     Alpha-Hydroxyalprazolam <25 Cutoff <25 ng/mL     Alpha-Hydroxymidazolam <25 Cutoff <25 ng/mL     Alprazolam <25 Cutoff <25 ng/mL     Chlordiazepoxide <25 Cutoff <25 ng/mL     Clonazepam <25 Cutoff <25 ng/mL     Diazepam <25 Cutoff <25 ng/mL     Lorazepam <25 Cutoff <25 ng/mL     Midazolam <25 Cutoff <25 ng/mL     Nordiazepam <25 Cutoff <25 ng/mL     Oxazepam <25 Cutoff <25 ng/mL     Temazepam <25 Cutoff <25 ng/mL     Zolpidem <25 Cutoff <25 ng/mL     Zolpidem Metabolite (ZCA) <25 Cutoff <25 ng/mL     6-Acetylmorphine <25 Cutoff <25 ng/mL     Codeine <50 Cutoff <50  ng/mL     Hydrocodone 424 (A) Cutoff <25 ng/mL     Hydromorphone 113 (A) Cutoff <25 ng/mL     Morphine Urine <50 Cutoff <50 ng/mL     Norhydrocodone 181 (A) Cutoff <25 ng/mL     Noroxycodone <25 Cutoff <25 ng/mL     Oxycodone <25 Cutoff <25 ng/mL     Oxymorphone <25 Cutoff <25 ng/mL     Tramadol <50 Cutoff <50 ng/mL     O-Desmethyltramadol <50 Cutoff <50 ng/mL     Fentanyl <2.5 Cutoff<2.5 ng/mL     Norfentanyl <2.5 Cutoff<2.5 ng/mL     METHADONE CONFIRMATION,URINE <25 Cutoff <25 ng/mL     EDDP <25 Cutoff <25 ng/mL         N/A     Controlled Substance Agreement:  Date of the Last Agreement: 07/21/2023  Reviewed Controlled Substance Agreement including but not limited to the benefits, risks, and alternatives to treatment with a Controlled Substance medication(s).     Opioids:  What is the patient's goal of therapy? Improvement of chronic pain              Is this being achieved with current treatment? yes     I have calculated the patient's Morphine Dose Equivalent (MED):   I have considered referral to Pain Management and/or a specialist, and do not feel it is necessary at this time.     I feel that it is clinically indicated to continue this current medication regimen after consideration of alternative therapies, and other non-opioid treatment.     Opioid Risk Screening:  Family History of Substance Abuse  Alcohol: 0 (7/21/2023  9:00 AM)  Illegal Drugs: 0 (7/21/2023  9:00 AM)  Prescription Drugs: 0 (7/21/2023  9:00 AM)     Personal History of Substance Abuse  Alcohol: 3 (7/21/2023  9:00 AM)  Illegal Drugs: 0 (7/21/2023  9:00 AM)  Prescription Drugs: 0 (7/21/2023  9:00 AM)     Patient Age (16-45)  Age (16-45): 0 (7/21/2023  9:00 AM)     History of Preadolescent Sexual Abuse  History of Preadolescent Sexual Abuse: .0 (7/21/2023  9:00 AM)     Psychological Disease  Attention Deficit Disorder, Obsessive Compulsive Disorder, Bipolar, Schizophrenia: 0 (7/21/2023  9:00 AM)  Depression: 1 (7/21/2023  9:00 AM)     Total  Score  Total Score: 4 (7/21/2023  9:00 AM)     Total Score Risk Category  TOTAL SCORE CATEGORY: Moderate Risk (4-7) (7/21/2023  9:00 AM)           Pain Assessment:  No data recorded       Assessment/Plan   Problem List Items Addressed This Visit       Acute non-recurrent frontal sinusitis    Relevant Medications    amoxicillin-pot clavulanate (Augmentin) 875-125 mg tablet    RESOLVED: Compression fracture of fourth lumbar vertebra (CMS/HCC)    Erectile dysfunction    RESOLVED: Chronic venous stasis dermatitis of both lower extremities    Hypertension - Primary    RESOLVED: Hyperlipidemia    RESOLVED: Sacroiliitis (CMS/HCC)    Osteoporosis    Hyponatremia    Alcohol abuse    Neuropathy, alcoholic (CMS/HCC)   1. Left avascular necrosis s/p hip replacement, found to have lumbar compression fracture, was referred to Neurosurgery but because he had lost insurance was not able to pursue at that time, did eventually see Neurosurgery and recommended seeing pain management for injections  - had injection with pain management > 1 year ago   - on lyrica 75mg po tid   - CSA and UDS up to date   -continue percocet 10/325mg po tid prn   - I have personally reviewed this patient's OARRS report and found it to be appropriate. The report has been uploaded into the medical record. I have considered the risks of abuse, addiction, dependence, and diversion and feel that it is clinically appropriate for this patient to be prescribed this controlled medication.      2. Depression, stable   - continue Cymbalta 60mg po daily      3. HTN , controlled   - continue lisinopril 40mg po daily      4. Chronic venous stasis changes on maribell legs  - can continue Lasix as needed , use compression stockings     5. Gout , controlled   - continue allopurinol   - mitigare prn in times of flare      6. Hyponatremia 2/2 alcohol   - following with nephrology, 12/23 Na was 124  - has cut back on his alcohol  - taking na tabs         7. Osteoporosis, found to  have low T, multifactorial with drinking alcohol and low t  - on testosterone replacement  - calcium and vit d   - repeat bone density in 2 years    Final diagnoses:   [G62.1] Neuropathy, alcoholic (CMS/[facility])   [I10] Primary hypertension   [E78.2] Mixed hyperlipidemia   [I87.2] Chronic venous stasis dermatitis of both lower extremities   [S32.040D] Compression fracture of L4 vertebra with routine healing, subsequent encounter   [M80.80XD] Other osteoporosis with current pathological fracture with routine healing, subsequent encounter   [E87.1] Hyponatremia   [N52.9] Erectile dysfunction, unspecified erectile dysfunction type   [F10.10] Alcohol abuse   [M46.1] Sacroiliitis (CMS/[facility])   [J01.10] Acute non-recurrent frontal sinusitis

## 2024-01-30 ENCOUNTER — HOSPITAL ENCOUNTER (OUTPATIENT)
Dept: RADIOLOGY | Facility: HOSPITAL | Age: 59
Discharge: HOME | End: 2024-01-30
Payer: MEDICAID

## 2024-01-30 ENCOUNTER — LAB (OUTPATIENT)
Dept: LAB | Facility: LAB | Age: 59
End: 2024-01-30
Payer: MEDICAID

## 2024-01-30 DIAGNOSIS — M89.9 DISORDER OF BONE, UNSPECIFIED: ICD-10-CM

## 2024-01-30 DIAGNOSIS — M94.9 DISORDER OF CARTILAGE, UNSPECIFIED: ICD-10-CM

## 2024-01-30 DIAGNOSIS — R79.89 OTHER SPECIFIED ABNORMAL FINDINGS OF BLOOD CHEMISTRY: ICD-10-CM

## 2024-01-30 DIAGNOSIS — M51.26 OTHER INTERVERTEBRAL DISC DISPLACEMENT, LUMBAR REGION: Primary | ICD-10-CM

## 2024-01-30 DIAGNOSIS — M25.561 PAIN IN RIGHT KNEE: ICD-10-CM

## 2024-01-30 DIAGNOSIS — D64.9 ANEMIA, UNSPECIFIED: ICD-10-CM

## 2024-01-30 DIAGNOSIS — I89.0 LYMPHEDEMA, NOT ELSEWHERE CLASSIFIED: ICD-10-CM

## 2024-01-30 DIAGNOSIS — M75.22 BICIPITAL TENDINITIS, LEFT SHOULDER: ICD-10-CM

## 2024-01-30 DIAGNOSIS — M79.641 PAIN IN RIGHT HAND: ICD-10-CM

## 2024-01-30 DIAGNOSIS — R68.2 DRY MOUTH, UNSPECIFIED: ICD-10-CM

## 2024-01-30 DIAGNOSIS — T84.011S: ICD-10-CM

## 2024-01-30 DIAGNOSIS — M51.36 OTHER INTERVERTEBRAL DISC DEGENERATION, LUMBAR REGION: ICD-10-CM

## 2024-01-30 DIAGNOSIS — G56.03 CARPAL TUNNEL SYNDROME, BILATERAL UPPER LIMBS: ICD-10-CM

## 2024-01-30 DIAGNOSIS — M79.642 PAIN IN LEFT HAND: ICD-10-CM

## 2024-01-30 DIAGNOSIS — R53.83 OTHER FATIGUE: ICD-10-CM

## 2024-01-30 DIAGNOSIS — R73.9 HYPERGLYCEMIA, UNSPECIFIED: ICD-10-CM

## 2024-01-30 DIAGNOSIS — M25.532 PAIN IN LEFT WRIST: ICD-10-CM

## 2024-01-30 DIAGNOSIS — M25.512 PAIN IN LEFT SHOULDER: ICD-10-CM

## 2024-01-30 DIAGNOSIS — R29.898 OTHER SYMPTOMS AND SIGNS INVOLVING THE MUSCULOSKELETAL SYSTEM: ICD-10-CM

## 2024-01-30 DIAGNOSIS — R76.8 OTHER SPECIFIED ABNORMAL IMMUNOLOGICAL FINDINGS IN SERUM: ICD-10-CM

## 2024-01-30 DIAGNOSIS — M75.82 OTHER SHOULDER LESIONS, LEFT SHOULDER: ICD-10-CM

## 2024-01-30 DIAGNOSIS — R60.0 LOCALIZED EDEMA: ICD-10-CM

## 2024-01-30 DIAGNOSIS — Z87.39 PERSONAL HISTORY OF OTHER DISEASES OF THE MUSCULOSKELETAL SYSTEM AND CONNECTIVE TISSUE: ICD-10-CM

## 2024-01-30 DIAGNOSIS — E87.8 OTHER DISORDERS OF ELECTROLYTE AND FLUID BALANCE, NOT ELSEWHERE CLASSIFIED: ICD-10-CM

## 2024-01-30 DIAGNOSIS — M25.552 PAIN IN LEFT HIP: ICD-10-CM

## 2024-01-30 DIAGNOSIS — K11.7 DISTURBANCES OF SALIVARY SECRETION: ICD-10-CM

## 2024-01-30 DIAGNOSIS — M41.9 SCOLIOSIS, UNSPECIFIED: ICD-10-CM

## 2024-01-30 DIAGNOSIS — M25.531 PAIN IN RIGHT WRIST: ICD-10-CM

## 2024-01-30 DIAGNOSIS — S32.040S WEDGE COMPRESSION FRACTURE OF FOURTH LUMBAR VERTEBRA, SEQUELA: ICD-10-CM

## 2024-01-30 DIAGNOSIS — M25.511 PAIN IN RIGHT SHOULDER: ICD-10-CM

## 2024-01-30 DIAGNOSIS — M70.62 TROCHANTERIC BURSITIS, LEFT HIP: ICD-10-CM

## 2024-01-30 DIAGNOSIS — G89.29 OTHER CHRONIC PAIN: ICD-10-CM

## 2024-01-30 DIAGNOSIS — M54.9 DORSALGIA, UNSPECIFIED: ICD-10-CM

## 2024-01-30 DIAGNOSIS — R20.2 PARESTHESIA OF SKIN: ICD-10-CM

## 2024-01-30 DIAGNOSIS — M25.562 PAIN IN LEFT KNEE: ICD-10-CM

## 2024-01-30 DIAGNOSIS — Z79.899 OTHER LONG TERM (CURRENT) DRUG THERAPY: ICD-10-CM

## 2024-01-30 LAB
ALBUMIN SERPL BCP-MCNC: 3.9 G/DL (ref 3.4–5)
ALP SERPL-CCNC: 170 U/L (ref 33–136)
ALT SERPL W P-5'-P-CCNC: 14 U/L (ref 10–52)
ANION GAP SERPL CALC-SCNC: 14 MMOL/L (ref 10–20)
APPEARANCE UR: CLEAR
AST SERPL W P-5'-P-CCNC: 33 U/L (ref 9–39)
BASOPHILS # BLD AUTO: 0.03 X10*3/UL (ref 0–0.1)
BASOPHILS NFR BLD AUTO: 0.6 %
BILIRUB SERPL-MCNC: 1.5 MG/DL (ref 0–1.2)
BILIRUB UR STRIP.AUTO-MCNC: NEGATIVE MG/DL
BUN SERPL-MCNC: 8 MG/DL (ref 6–23)
C3 SERPL-MCNC: 125 MG/DL (ref 87–200)
C4 SERPL-MCNC: 23 MG/DL (ref 10–50)
CALCIUM SERPL-MCNC: 9.1 MG/DL (ref 8.6–10.3)
CCP IGG SERPL-ACNC: <1 U/ML
CHLORIDE SERPL-SCNC: 94 MMOL/L (ref 98–107)
CK SERPL-CCNC: 12 U/L (ref 0–325)
CO2 SERPL-SCNC: 25 MMOL/L (ref 21–32)
COLOR UR: YELLOW
CREAT SERPL-MCNC: 0.74 MG/DL (ref 0.5–1.3)
CRP SERPL-MCNC: 0.37 MG/DL
EGFRCR SERPLBLD CKD-EPI 2021: >90 ML/MIN/1.73M*2
EOSINOPHIL # BLD AUTO: 0.13 X10*3/UL (ref 0–0.7)
EOSINOPHIL NFR BLD AUTO: 2.5 %
ERYTHROCYTE [DISTWIDTH] IN BLOOD BY AUTOMATED COUNT: 14.6 % (ref 11.5–14.5)
ERYTHROCYTE [SEDIMENTATION RATE] IN BLOOD BY WESTERGREN METHOD: 9 MM/H (ref 0–20)
EST. AVERAGE GLUCOSE BLD GHB EST-MCNC: 77 MG/DL
GLUCOSE SERPL-MCNC: 100 MG/DL (ref 74–99)
GLUCOSE UR STRIP.AUTO-MCNC: NEGATIVE MG/DL
HAV IGM SER QL: NONREACTIVE
HBA1C MFR BLD: 4.3 %
HBV CORE IGM SER QL: NONREACTIVE
HBV SURFACE AG SERPL QL IA: NONREACTIVE
HCT VFR BLD AUTO: 32.6 % (ref 41–52)
HCV AB SER QL: NONREACTIVE
HGB BLD-MCNC: 11.1 G/DL (ref 13.5–17.5)
IMM GRANULOCYTES # BLD AUTO: 0.03 X10*3/UL (ref 0–0.7)
IMM GRANULOCYTES NFR BLD AUTO: 0.6 % (ref 0–0.9)
KETONES UR STRIP.AUTO-MCNC: NEGATIVE MG/DL
LEUKOCYTE ESTERASE UR QL STRIP.AUTO: NEGATIVE
LYMPHOCYTES # BLD AUTO: 0.72 X10*3/UL (ref 1.2–4.8)
LYMPHOCYTES NFR BLD AUTO: 13.9 %
MAGNESIUM SERPL-MCNC: 1.25 MG/DL (ref 1.6–2.4)
MCH RBC QN AUTO: 34.7 PG (ref 26–34)
MCHC RBC AUTO-ENTMCNC: 34 G/DL (ref 32–36)
MCV RBC AUTO: 102 FL (ref 80–100)
MONOCYTES # BLD AUTO: 0.61 X10*3/UL (ref 0.1–1)
MONOCYTES NFR BLD AUTO: 11.8 %
NEUTROPHILS # BLD AUTO: 3.65 X10*3/UL (ref 1.2–7.7)
NEUTROPHILS NFR BLD AUTO: 70.6 %
NITRITE UR QL STRIP.AUTO: NEGATIVE
NRBC BLD-RTO: 0 /100 WBCS (ref 0–0)
PH UR STRIP.AUTO: 6 [PH]
PLATELET # BLD AUTO: 141 X10*3/UL (ref 150–450)
POTASSIUM SERPL-SCNC: 4.1 MMOL/L (ref 3.5–5.3)
PROT SERPL-MCNC: 6.3 G/DL (ref 6.4–8.2)
PROT SERPL-MCNC: 6.6 G/DL (ref 6.4–8.2)
PROT UR STRIP.AUTO-MCNC: NEGATIVE MG/DL
RBC # BLD AUTO: 3.2 X10*6/UL (ref 4.5–5.9)
RBC # UR STRIP.AUTO: NEGATIVE /UL
SODIUM SERPL-SCNC: 129 MMOL/L (ref 136–145)
SP GR UR STRIP.AUTO: 1
TSH SERPL-ACNC: 3.63 MIU/L (ref 0.44–3.98)
TTG IGA SER IA-ACNC: 15.6 U/ML
URATE SERPL-MCNC: 6 MG/DL (ref 4–7.5)
UROBILINOGEN UR STRIP.AUTO-MCNC: <2 MG/DL
VIT B12 SERPL-MCNC: 385 PG/ML (ref 211–911)
WBC # BLD AUTO: 5.2 X10*3/UL (ref 4.4–11.3)

## 2024-01-30 PROCEDURE — 84080 ASSAY ALKALINE PHOSPHATASES: CPT

## 2024-01-30 PROCEDURE — 73130 X-RAY EXAM OF HAND: CPT | Mod: 50

## 2024-01-30 PROCEDURE — 72202 X-RAY EXAM SI JOINTS 3/> VWS: CPT

## 2024-01-30 PROCEDURE — 86015 ACTIN ANTIBODY EACH: CPT

## 2024-01-30 PROCEDURE — 86256 FLUORESCENT ANTIBODY TITER: CPT

## 2024-01-30 PROCEDURE — 86140 C-REACTIVE PROTEIN: CPT

## 2024-01-30 PROCEDURE — 81381 HLA I TYPING 1 ALLELE HR: CPT

## 2024-01-30 PROCEDURE — 85652 RBC SED RATE AUTOMATED: CPT

## 2024-01-30 PROCEDURE — 80074 ACUTE HEPATITIS PANEL: CPT

## 2024-01-30 PROCEDURE — 83516 IMMUNOASSAY NONANTIBODY: CPT

## 2024-01-30 PROCEDURE — 82164 ANGIOTENSIN I ENZYME TEST: CPT

## 2024-01-30 PROCEDURE — 83036 HEMOGLOBIN GLYCOSYLATED A1C: CPT

## 2024-01-30 PROCEDURE — 73502 X-RAY EXAM HIP UNI 2-3 VIEWS: CPT | Mod: LEFT SIDE

## 2024-01-30 PROCEDURE — 84207 ASSAY OF VITAMIN B-6: CPT

## 2024-01-30 PROCEDURE — 84443 ASSAY THYROID STIM HORMONE: CPT

## 2024-01-30 PROCEDURE — 82550 ASSAY OF CK (CPK): CPT

## 2024-01-30 PROCEDURE — 86038 ANTINUCLEAR ANTIBODIES: CPT

## 2024-01-30 PROCEDURE — 86036 ANCA SCREEN EACH ANTIBODY: CPT

## 2024-01-30 PROCEDURE — 73564 X-RAY EXAM KNEE 4 OR MORE: CPT | Mod: 50

## 2024-01-30 PROCEDURE — 84165 PROTEIN E-PHORESIS SERUM: CPT

## 2024-01-30 PROCEDURE — 84165 PROTEIN E-PHORESIS SERUM: CPT | Performed by: INTERNAL MEDICINE

## 2024-01-30 PROCEDURE — 86160 COMPLEMENT ANTIGEN: CPT

## 2024-01-30 PROCEDURE — 73110 X-RAY EXAM OF WRIST: CPT | Mod: 50

## 2024-01-30 PROCEDURE — 86320 SERUM IMMUNOELECTROPHORESIS: CPT | Performed by: INTERNAL MEDICINE

## 2024-01-30 PROCEDURE — 81003 URINALYSIS AUTO W/O SCOPE: CPT

## 2024-01-30 PROCEDURE — 86381 MITOCHONDRIAL ANTIBODY EACH: CPT

## 2024-01-30 PROCEDURE — 85025 COMPLETE CBC W/AUTO DIFF WBC: CPT

## 2024-01-30 PROCEDURE — 82607 VITAMIN B-12: CPT

## 2024-01-30 PROCEDURE — 87476 LYME DIS DNA AMP PROBE: CPT

## 2024-01-30 PROCEDURE — 86334 IMMUNOFIX E-PHORESIS SERUM: CPT

## 2024-01-30 PROCEDURE — 73502 X-RAY EXAM HIP UNI 2-3 VIEWS: CPT | Mod: LT

## 2024-01-30 PROCEDURE — 83735 ASSAY OF MAGNESIUM: CPT

## 2024-01-30 PROCEDURE — 84425 ASSAY OF VITAMIN B-1: CPT

## 2024-01-30 PROCEDURE — 73030 X-RAY EXAM OF SHOULDER: CPT | Mod: 50

## 2024-01-30 PROCEDURE — 84550 ASSAY OF BLOOD/URIC ACID: CPT

## 2024-01-30 PROCEDURE — 86200 CCP ANTIBODY: CPT

## 2024-01-30 PROCEDURE — 36415 COLL VENOUS BLD VENIPUNCTURE: CPT

## 2024-01-30 PROCEDURE — 80053 COMPREHEN METABOLIC PANEL: CPT

## 2024-01-30 PROCEDURE — 73130 X-RAY EXAM OF HAND: CPT | Mod: BILATERAL PROCEDURE

## 2024-01-30 PROCEDURE — 73110 X-RAY EXAM OF WRIST: CPT | Mod: BILATERAL PROCEDURE

## 2024-01-30 PROCEDURE — 73030 X-RAY EXAM OF SHOULDER: CPT | Mod: BILATERAL PROCEDURE

## 2024-01-30 PROCEDURE — 84155 ASSAY OF PROTEIN SERUM: CPT

## 2024-01-31 LAB
ANA SER QL HEP2 SUBST: NEGATIVE
MITOCHONDRIA AB SER QL IF: NEGATIVE

## 2024-02-01 LAB
ACE SERPL-CCNC: <10 U/L (ref 16–85)
SMOOTH MUSCLE AB SER QL IF: ABNORMAL

## 2024-02-02 LAB
ALBUMIN: 3.7 G/DL (ref 3.4–5)
ALP BONE SERPL-CCNC: 50 U/L (ref 0–55)
ALP LIVER SERPL-CCNC: 149 U/L (ref 0–94)
ALP OTHER SERPL-CCNC: 0 U/L
ALP SERPL-CCNC: 199 U/L (ref 40–120)
ALPHA 1 GLOBULIN: 0.3 G/DL (ref 0.2–0.6)
ALPHA 2 GLOBULIN: 0.6 G/DL (ref 0.4–1.1)
B BURGDOR DNA SPEC QL NAA+PROBE: NOT DETECTED
BETA GLOBULIN: 0.8 G/DL (ref 0.5–1.2)
GAMMA GLOBULIN: 1.2 G/DL (ref 0.5–1.4)
IMMUNOFIXATION COMMENT: NORMAL
PATH REVIEW - SERUM IMMUNOFIXATION: NORMAL
PATH REVIEW-SERUM PROTEIN ELECTROPHORESIS: NORMAL
PROTEIN ELECTROPHORESIS COMMENT: NORMAL
PYRIDOXAL PHOS SERPL-SCNC: 34.4 NMOL/L (ref 20–125)
SCAN RESULT: NORMAL
SPECIMEN SOURCE: NORMAL

## 2024-02-03 LAB
ANCA AB PATTERN SER IF-IMP: NORMAL
ANCA IGG TITR SER IF: NORMAL {TITER}
MYELOPEROXIDASE AB SER-ACNC: 2 AU/ML (ref 0–19)
PROTEINASE3 AB SER-ACNC: 3 AU/ML (ref 0–19)
VIT B1 PYROPHOSHATE BLD-SCNC: 99 NMOL/L (ref 70–180)

## 2024-02-05 LAB — HLAB27 TYPING: NEGATIVE

## 2024-02-12 ENCOUNTER — LAB (OUTPATIENT)
Dept: LAB | Facility: LAB | Age: 59
End: 2024-02-12
Payer: MEDICAID

## 2024-02-12 DIAGNOSIS — D69.8: Primary | ICD-10-CM

## 2024-02-12 DIAGNOSIS — E83.42 HYPOMAGNESEMIA: ICD-10-CM

## 2024-02-12 DIAGNOSIS — R79.89 OTHER SPECIFIED ABNORMAL FINDINGS OF BLOOD CHEMISTRY: ICD-10-CM

## 2024-02-12 LAB
ALBUMIN SERPL BCP-MCNC: 3.8 G/DL (ref 3.4–5)
ALP SERPL-CCNC: 194 U/L (ref 33–136)
ALT SERPL W P-5'-P-CCNC: 13 U/L (ref 10–52)
ANION GAP SERPL CALC-SCNC: 14 MMOL/L
AST SERPL W P-5'-P-CCNC: 26 U/L (ref 9–39)
BILIRUB SERPL-MCNC: 1.5 MG/DL (ref 0–1.2)
BUN SERPL-MCNC: 6 MG/DL (ref 6–23)
CALCIUM SERPL-MCNC: 9 MG/DL (ref 8.6–10.3)
CARDIOLIPIN IGA SERPL-ACNC: 52.6 APL U/ML
CARDIOLIPIN IGG SER IA-ACNC: <1.6 GPL U/ML
CARDIOLIPIN IGM SER IA-ACNC: <0.2 MPL U/ML
CHLORIDE SERPL-SCNC: 92 MMOL/L (ref 98–107)
CO2 SERPL-SCNC: 25 MMOL/L (ref 21–32)
CREAT SERPL-MCNC: 0.75 MG/DL (ref 0.5–1.3)
EGFRCR SERPLBLD CKD-EPI 2021: >90 ML/MIN/1.73M*2
GLUCOSE SERPL-MCNC: 105 MG/DL (ref 74–99)
MAGNESIUM SERPL-MCNC: 1.23 MG/DL (ref 1.6–2.4)
PLATELET # BLD AUTO: 158 X10*3/UL (ref 150–450)
POTASSIUM SERPL-SCNC: 4 MMOL/L (ref 3.5–5.3)
PROT SERPL-MCNC: 6.4 G/DL (ref 6.4–8.2)
SODIUM SERPL-SCNC: 127 MMOL/L (ref 136–145)

## 2024-02-12 PROCEDURE — 86023 IMMUNOGLOBULIN ASSAY: CPT

## 2024-02-12 PROCEDURE — 85613 RUSSELL VIPER VENOM DILUTED: CPT

## 2024-02-12 PROCEDURE — 85049 AUTOMATED PLATELET COUNT: CPT

## 2024-02-12 PROCEDURE — 83735 ASSAY OF MAGNESIUM: CPT

## 2024-02-12 PROCEDURE — 80053 COMPREHEN METABOLIC PANEL: CPT

## 2024-02-12 PROCEDURE — 36415 COLL VENOUS BLD VENIPUNCTURE: CPT

## 2024-02-12 PROCEDURE — 85730 THROMBOPLASTIN TIME PARTIAL: CPT

## 2024-02-12 PROCEDURE — 86147 CARDIOLIPIN ANTIBODY EA IG: CPT

## 2024-02-14 ENCOUNTER — OFFICE VISIT (OUTPATIENT)
Dept: PAIN MEDICINE | Facility: CLINIC | Age: 59
End: 2024-02-14
Payer: MEDICAID

## 2024-02-14 VITALS
SYSTOLIC BLOOD PRESSURE: 122 MMHG | WEIGHT: 315 LBS | DIASTOLIC BLOOD PRESSURE: 71 MMHG | RESPIRATION RATE: 16 BRPM | BODY MASS INDEX: 49.36 KG/M2 | HEART RATE: 81 BPM

## 2024-02-14 DIAGNOSIS — M25.50 ARTHRALGIA, UNSPECIFIED JOINT: ICD-10-CM

## 2024-02-14 DIAGNOSIS — G62.1 NEUROPATHY, ALCOHOLIC (MULTI): Primary | ICD-10-CM

## 2024-02-14 PROCEDURE — 99213 OFFICE O/P EST LOW 20 MIN: CPT | Performed by: PHYSICIAN ASSISTANT

## 2024-02-14 RX ORDER — PREGABALIN 25 MG/1
25 CAPSULE ORAL 2 TIMES DAILY
Qty: 60 CAPSULE | Refills: 0 | Status: SHIPPED | OUTPATIENT
Start: 2024-02-14 | End: 2024-05-14 | Stop reason: ALTCHOICE

## 2024-02-14 ASSESSMENT — ENCOUNTER SYMPTOMS
RESPIRATORY NEGATIVE: 1
ALLERGIC/IMMUNOLOGIC NEGATIVE: 1
CARDIOVASCULAR NEGATIVE: 1
HEMATOLOGIC/LYMPHATIC NEGATIVE: 1
BACK PAIN: 1
PSYCHIATRIC NEGATIVE: 1
ENDOCRINE NEGATIVE: 1
EYES NEGATIVE: 1
GASTROINTESTINAL NEGATIVE: 1
ARTHRALGIAS: 1
CONSTITUTIONAL NEGATIVE: 1

## 2024-02-14 NOTE — PROGRESS NOTES
Subjective   Patient ID: Tr Arnold is a 58 y.o. male who presents for Back Pain (FUV  Today reports having midline lower back pain  that goes up into his bilat shoulders and arms and having pain in bilat legs with activity,  rates pain score 2/10 now and 9-10/10 at its worst with activity. He reports all of his ADLs cause pain and he has to sit and rest for a while due to sever pain this helps to decrease his pain . He is taking Norco, moving as much as he can) He walks with a cane.  LAZARO score 44. Falls N/A for age, Depression negative, does not smoke but uses chew education provided.       Evelin Donovan RN 02/14/24 8:48 AM     Patient is a 58-year-old male. He presents today  to once again discuss his medications.  He states that he has cut back provide on his alcohol intake.  He states that he maybe has 1 or 2 beers a night and a few more on the weekends but he states that overall, he has cut back significantly.  He saw a rheumatologist.  He is now using vitamin D and magnesium.  He states that things are going better.  He states that his pain is improved.  He rates it a 2/10.  He states that with activities it can get worse at a 9/10.    He is given [place] by his PCP.  He states that it does help him.  He feels that since starting the medications with rheumatology things have improved.  He has another upcoming appointment with them.        Review of Systems   Constitutional: Negative.    HENT: Negative.     Eyes: Negative.    Respiratory: Negative.     Cardiovascular: Negative.    Gastrointestinal: Negative.    Endocrine: Negative.    Genitourinary: Negative.    Musculoskeletal:  Positive for arthralgias, back pain and gait problem.   Skin: Negative.    Allergic/Immunologic: Negative.    Hematological: Negative.    Psychiatric/Behavioral: Negative.         Objective   Physical Exam  Vitals and nursing note reviewed.   Constitutional:       Appearance: Normal appearance. He is obese.   HENT:      Head:  Normocephalic and atraumatic.      Right Ear: External ear normal.      Left Ear: External ear normal.      Nose: Nose normal.      Mouth/Throat:      Pharynx: Oropharynx is clear.   Eyes:      Pupils: Pupils are equal, round, and reactive to light.   Cardiovascular:      Rate and Rhythm: Normal rate and regular rhythm.      Pulses: Normal pulses.   Pulmonary:      Effort: Pulmonary effort is normal.   Musculoskeletal:         General: Normal range of motion.      Cervical back: Normal range of motion.      Comments: 5/5 lower extremity strength  Ambulating with a cane   Skin:     General: Skin is warm and dry.   Neurological:      General: No focal deficit present.      Mental Status: He is alert and oriented to person, place, and time. Mental status is at baseline.   Psychiatric:         Mood and Affect: Mood normal.         Behavior: Behavior normal.         Thought Content: Thought content normal.         Judgment: Judgment normal.         Assessment/Plan   Diagnoses and all orders for this visit:  Neuropathy, alcoholic (CMS/HCC)  -     pregabalin (Lyrica) 25 mg capsule; Take 1 capsule (25 mg) by mouth 2 times a day.  Arthralgia, unspecified joint  -     pregabalin (Lyrica) 25 mg capsule; Take 1 capsule (25 mg) by mouth 2 times a day.       Patient is a 58-year-old male with a past medical history significant for chronic pain and neuropathy-suspected to be from alcohol.  He states that he has cut back quite a bit.  He states that things are going better.  He is not using vitamin D and magnesium by rheumatology.  He is given [place] by his PCP.  He states that things are hard with the alcohol cut down but he has been working very hard at this.  He has cut back by half.  We discussed trialing Lyrica.  Low-dose.  Patient side effects of the medication were discussed in conjunction with his other medications.  At this time, he will start 25 mg twice daily.  Follow-up in 1 month.  Call the clinic sooner if necessary.   OARRS reviewed.

## 2024-02-15 ENCOUNTER — TELEPHONE (OUTPATIENT)
Dept: PRIMARY CARE | Facility: CLINIC | Age: 59
End: 2024-02-15
Payer: MEDICAID

## 2024-02-15 DIAGNOSIS — R01.1 NEWLY RECOGNIZED HEART MURMUR: Primary | ICD-10-CM

## 2024-02-15 LAB
DRVVT SCREEN TO CONFIRM RATIO: 0.93 RATIO
DRVVT/DRVVT CFM NRMLZD PPP-RTO: 1.07 RATIO
DRVVT/DRVVT CFM P DOAC NEUT NORM PPP-RTO: 0.87 RATIO
LA 2 SCREEN W REFLEX-IMP: NORMAL
NORMALIZED SCT PPP-RTO: 0.6 RATIO
SILICA CLOTTING TIME CONFIRMATION: 1.74 RATIO
SILICA CLOTTING TIME SCREEN: 1.04 RATIO

## 2024-02-15 NOTE — TELEPHONE ENCOUNTER
Stated Dr. Vazquez concerned patient has heart murmur. Patient said he wanted his PCP to order some kind of heart scan but he could not remember what scan it was; he said possible EKG

## 2024-02-17 LAB
PLATELET ANTIBODY TARGET 1 IGG RESULT: NEGATIVE
PLATELET ANTIBODY TARGET 1 IGM RESULT: NEGATIVE
PLATELET ANTIBODY TARGET 2 IGG RESULT: NEGATIVE
PLATELET ANTIBODY TARGET 2 IGM RESULT: NEGATIVE
SCAN RESULT: NORMAL

## 2024-02-19 ENCOUNTER — LAB (OUTPATIENT)
Dept: LAB | Facility: LAB | Age: 59
End: 2024-02-19
Payer: MEDICAID

## 2024-02-19 DIAGNOSIS — M11.20 OTHER CHONDROCALCINOSIS, UNSPECIFIED SITE: ICD-10-CM

## 2024-02-19 DIAGNOSIS — D64.9 ANEMIA, UNSPECIFIED: ICD-10-CM

## 2024-02-19 DIAGNOSIS — E83.50 UNSPECIFIED DISORDER OF CALCIUM METABOLISM: Primary | ICD-10-CM

## 2024-02-19 DIAGNOSIS — R79.89 OTHER SPECIFIED ABNORMAL FINDINGS OF BLOOD CHEMISTRY: ICD-10-CM

## 2024-02-19 LAB
CERULOPLASMIN SERPL-MCNC: 25.1 MG/DL (ref 20–60)
FERRITIN SERPL-MCNC: 159 NG/ML (ref 20–300)
IRON SATN MFR SERPL: 23 % (ref 25–45)
IRON SERPL-MCNC: 61 UG/DL (ref 35–150)
PROT SERPL-MCNC: 6.7 G/DL (ref 6.4–8.2)
PTH-INTACT SERPL-MCNC: 9.8 PG/ML (ref 18.5–88)
TIBC SERPL-MCNC: 271 UG/DL (ref 240–445)
TRANSFERRIN SERPL-MCNC: 192 MG/DL (ref 200–360)
UIBC SERPL-MCNC: 210 UG/DL (ref 110–370)

## 2024-02-19 PROCEDURE — 84165 PROTEIN E-PHORESIS SERUM: CPT | Performed by: INTERNAL MEDICINE

## 2024-02-19 PROCEDURE — 83540 ASSAY OF IRON: CPT

## 2024-02-19 PROCEDURE — 83550 IRON BINDING TEST: CPT

## 2024-02-19 PROCEDURE — 84155 ASSAY OF PROTEIN SERUM: CPT

## 2024-02-19 PROCEDURE — 86320 SERUM IMMUNOELECTROPHORESIS: CPT | Performed by: INTERNAL MEDICINE

## 2024-02-19 PROCEDURE — 36415 COLL VENOUS BLD VENIPUNCTURE: CPT

## 2024-02-19 PROCEDURE — 84165 PROTEIN E-PHORESIS SERUM: CPT

## 2024-02-19 PROCEDURE — 84466 ASSAY OF TRANSFERRIN: CPT

## 2024-02-19 PROCEDURE — 82728 ASSAY OF FERRITIN: CPT

## 2024-02-19 PROCEDURE — 86334 IMMUNOFIX E-PHORESIS SERUM: CPT

## 2024-02-19 PROCEDURE — 82390 ASSAY OF CERULOPLASMIN: CPT

## 2024-02-19 PROCEDURE — 83970 ASSAY OF PARATHORMONE: CPT

## 2024-02-19 PROCEDURE — 82525 ASSAY OF COPPER: CPT

## 2024-02-20 DIAGNOSIS — S32.040G COMPRESSION FRACTURE OF L4 VERTEBRA WITH DELAYED HEALING, SUBSEQUENT ENCOUNTER: ICD-10-CM

## 2024-02-21 RX ORDER — HYDROCODONE BITARTRATE AND ACETAMINOPHEN 10; 325 MG/1; MG/1
1 TABLET ORAL EVERY 8 HOURS PRN
Qty: 75 TABLET | Refills: 0 | Status: SHIPPED | OUTPATIENT
Start: 2024-02-21 | End: 2024-03-18 | Stop reason: SDUPTHER

## 2024-02-22 ENCOUNTER — HOSPITAL ENCOUNTER (OUTPATIENT)
Dept: CARDIOLOGY | Facility: HOSPITAL | Age: 59
Discharge: HOME | End: 2024-02-22
Payer: MEDICAID

## 2024-02-22 VITALS
RESPIRATION RATE: 22 BRPM | HEART RATE: 91 BPM | SYSTOLIC BLOOD PRESSURE: 141 MMHG | OXYGEN SATURATION: 96 % | DIASTOLIC BLOOD PRESSURE: 61 MMHG

## 2024-02-22 DIAGNOSIS — R01.1 NEWLY RECOGNIZED HEART MURMUR: ICD-10-CM

## 2024-02-22 LAB
ALBUMIN: 3.6 G/DL (ref 3.4–5)
ALPHA 1 GLOBULIN: 0.3 G/DL (ref 0.2–0.6)
ALPHA 2 GLOBULIN: 0.7 G/DL (ref 0.4–1.1)
AORTIC VALVE MEAN GRADIENT: 8 MMHG
AORTIC VALVE PEAK VELOCITY: 1.89 M/S
AV PEAK GRADIENT: 14.3 MMHG
AVA (PEAK VEL): 2.49 CM2
AVA (VTI): 2.48 CM2
BETA GLOBULIN: 0.8 G/DL (ref 0.5–1.2)
COPPER SERPL-MCNC: 81.7 UG/DL (ref 70–140)
EJECTION FRACTION APICAL 4 CHAMBER: 54.8
EJECTION FRACTION: 57 %
GAMMA GLOBULIN: 1.2 G/DL (ref 0.5–1.4)
IMMUNOFIXATION COMMENT: NORMAL
LEFT ATRIUM VOLUME AREA LENGTH INDEX BSA: 17.4 ML/M2
LEFT VENTRICLE INTERNAL DIMENSION DIASTOLE: 5.15 CM (ref 3.5–6)
LEFT VENTRICULAR OUTFLOW TRACT DIAMETER: 2.1 CM
MITRAL VALVE E/A RATIO: 1.13
PATH REVIEW - SERUM IMMUNOFIXATION: NORMAL
PATH REVIEW-SERUM PROTEIN ELECTROPHORESIS: NORMAL
PROTEIN ELECTROPHORESIS COMMENT: NORMAL
RIGHT VENTRICLE FREE WALL PEAK S': 16.5 CM/S
RIGHT VENTRICLE PEAK SYSTOLIC PRESSURE: 35.3 MMHG
TRICUSPID ANNULAR PLANE SYSTOLIC EXCURSION: 2.4 CM

## 2024-02-22 PROCEDURE — 93306 TTE W/DOPPLER COMPLETE: CPT | Performed by: STUDENT IN AN ORGANIZED HEALTH CARE EDUCATION/TRAINING PROGRAM

## 2024-02-22 PROCEDURE — 93306 TTE W/DOPPLER COMPLETE: CPT

## 2024-02-22 PROCEDURE — 76937 US GUIDE VASCULAR ACCESS: CPT

## 2024-02-22 PROCEDURE — 2500000004 HC RX 250 GENERAL PHARMACY W/ HCPCS (ALT 636 FOR OP/ED): Performed by: STUDENT IN AN ORGANIZED HEALTH CARE EDUCATION/TRAINING PROGRAM

## 2024-02-22 RX ADMIN — PERFLUTREN 2 ML OF DILUTION: 6.52 INJECTION, SUSPENSION INTRAVENOUS at 10:45

## 2024-03-07 ENCOUNTER — APPOINTMENT (OUTPATIENT)
Dept: NEPHROLOGY | Facility: CLINIC | Age: 59
End: 2024-03-07
Payer: MEDICAID

## 2024-03-13 DIAGNOSIS — N52.9 ERECTILE DYSFUNCTION, UNSPECIFIED ERECTILE DYSFUNCTION TYPE: ICD-10-CM

## 2024-03-13 RX ORDER — TESTOSTERONE CYPIONATE 200 MG/ML
INJECTION, SOLUTION INTRAMUSCULAR
Qty: 2 ML | Refills: 2 | Status: SHIPPED | OUTPATIENT
Start: 2024-03-13

## 2024-03-16 ENCOUNTER — LAB (OUTPATIENT)
Dept: LAB | Facility: LAB | Age: 59
End: 2024-03-16
Payer: MEDICAID

## 2024-03-16 DIAGNOSIS — Z87.39 PERSONAL HISTORY OF OTHER DISEASES OF THE MUSCULOSKELETAL SYSTEM AND CONNECTIVE TISSUE: ICD-10-CM

## 2024-03-16 DIAGNOSIS — E80.6 OTHER DISORDERS OF BILIRUBIN METABOLISM: ICD-10-CM

## 2024-03-16 DIAGNOSIS — K90.0 CELIAC DISEASE (HHS-HCC): ICD-10-CM

## 2024-03-16 DIAGNOSIS — M17.0 BILATERAL PRIMARY OSTEOARTHRITIS OF KNEE: ICD-10-CM

## 2024-03-16 DIAGNOSIS — M41.9 SCOLIOSIS, UNSPECIFIED: ICD-10-CM

## 2024-03-16 DIAGNOSIS — E55.9 VITAMIN D DEFICIENCY, UNSPECIFIED: ICD-10-CM

## 2024-03-16 DIAGNOSIS — E83.42 HYPOMAGNESEMIA: ICD-10-CM

## 2024-03-16 DIAGNOSIS — M46.1 SACROILIITIS, NOT ELSEWHERE CLASSIFIED (CMS-HCC): ICD-10-CM

## 2024-03-16 DIAGNOSIS — K11.7 DISTURBANCES OF SALIVARY SECRETION: Primary | ICD-10-CM

## 2024-03-16 DIAGNOSIS — E20.9 HYPOPARATHYROIDISM, UNSPECIFIED (MULTI): ICD-10-CM

## 2024-03-16 DIAGNOSIS — M19.041 PRIMARY OSTEOARTHRITIS, RIGHT HAND: ICD-10-CM

## 2024-03-16 DIAGNOSIS — G56.03 CARPAL TUNNEL SYNDROME, BILATERAL UPPER LIMBS: ICD-10-CM

## 2024-03-16 DIAGNOSIS — M19.012 PRIMARY OSTEOARTHRITIS, LEFT SHOULDER: ICD-10-CM

## 2024-03-16 DIAGNOSIS — K75.4 AUTOIMMUNE HEPATITIS (MULTI): ICD-10-CM

## 2024-03-16 DIAGNOSIS — Z79.899 OTHER LONG TERM (CURRENT) DRUG THERAPY: ICD-10-CM

## 2024-03-16 DIAGNOSIS — E29.1 HYPOGONADISM IN MALE: ICD-10-CM

## 2024-03-16 DIAGNOSIS — E87.1 HYPO-OSMOLALITY AND HYPONATREMIA: ICD-10-CM

## 2024-03-16 DIAGNOSIS — R60.0 LOCALIZED EDEMA: ICD-10-CM

## 2024-03-16 DIAGNOSIS — R76.0 RAISED ANTIBODY TITER: ICD-10-CM

## 2024-03-16 DIAGNOSIS — R68.2 DRY MOUTH, UNSPECIFIED: ICD-10-CM

## 2024-03-16 DIAGNOSIS — E87.8 OTHER DISORDERS OF ELECTROLYTE AND FLUID BALANCE, NOT ELSEWHERE CLASSIFIED: ICD-10-CM

## 2024-03-16 DIAGNOSIS — M93.262 OSTEOCHONDRITIS DISSECANS, LEFT KNEE: ICD-10-CM

## 2024-03-16 DIAGNOSIS — M19.042 PRIMARY OSTEOARTHRITIS, LEFT HAND: ICD-10-CM

## 2024-03-16 DIAGNOSIS — R35.1 NOCTURIA: ICD-10-CM

## 2024-03-16 DIAGNOSIS — R73.9 HYPERGLYCEMIA, UNSPECIFIED: ICD-10-CM

## 2024-03-16 DIAGNOSIS — D64.9 ANEMIA, UNSPECIFIED: ICD-10-CM

## 2024-03-16 DIAGNOSIS — E87.1 HYPONATREMIA: ICD-10-CM

## 2024-03-16 DIAGNOSIS — R79.89 OTHER SPECIFIED ABNORMAL FINDINGS OF BLOOD CHEMISTRY: ICD-10-CM

## 2024-03-16 DIAGNOSIS — M51.36 OTHER INTERVERTEBRAL DISC DEGENERATION, LUMBAR REGION: ICD-10-CM

## 2024-03-16 LAB
ALBUMIN SERPL BCP-MCNC: 3.9 G/DL (ref 3.4–5)
ALP SERPL-CCNC: 125 U/L (ref 33–136)
ALT SERPL W P-5'-P-CCNC: 29 U/L (ref 10–52)
ANION GAP SERPL CALC-SCNC: 14 MMOL/L (ref 10–20)
AST SERPL W P-5'-P-CCNC: 45 U/L (ref 9–39)
BILIRUB SERPL-MCNC: 1.3 MG/DL (ref 0–1.2)
BUN SERPL-MCNC: 14 MG/DL (ref 6–23)
CALCIUM SERPL-MCNC: 9.8 MG/DL (ref 8.6–10.3)
CHLORIDE SERPL-SCNC: 95 MMOL/L (ref 98–107)
CO2 SERPL-SCNC: 24 MMOL/L (ref 21–32)
CREAT SERPL-MCNC: 0.74 MG/DL (ref 0.5–1.3)
EGFRCR SERPLBLD CKD-EPI 2021: >90 ML/MIN/1.73M*2
GLUCOSE SERPL-MCNC: 125 MG/DL (ref 74–99)
MAGNESIUM SERPL-MCNC: 1.72 MG/DL (ref 1.6–2.4)
POTASSIUM SERPL-SCNC: 4.6 MMOL/L (ref 3.5–5.3)
PROT SERPL-MCNC: 6.4 G/DL (ref 6.4–8.2)
PSA SERPL-MCNC: 0.12 NG/ML
SODIUM SERPL-SCNC: 128 MMOL/L (ref 136–145)
TESTOST SERPL-MCNC: 677 NG/DL (ref 240–1000)

## 2024-03-16 PROCEDURE — 84403 ASSAY OF TOTAL TESTOSTERONE: CPT

## 2024-03-16 PROCEDURE — 80053 COMPREHEN METABOLIC PANEL: CPT

## 2024-03-16 PROCEDURE — 83735 ASSAY OF MAGNESIUM: CPT

## 2024-03-16 PROCEDURE — 36415 COLL VENOUS BLD VENIPUNCTURE: CPT

## 2024-03-16 PROCEDURE — 84153 ASSAY OF PSA TOTAL: CPT

## 2024-03-18 ENCOUNTER — TELEPHONE (OUTPATIENT)
Dept: PRIMARY CARE | Facility: CLINIC | Age: 59
End: 2024-03-18
Payer: MEDICAID

## 2024-03-18 DIAGNOSIS — S32.040G COMPRESSION FRACTURE OF L4 VERTEBRA WITH DELAYED HEALING, SUBSEQUENT ENCOUNTER: ICD-10-CM

## 2024-03-18 RX ORDER — HYDROCODONE BITARTRATE AND ACETAMINOPHEN 10; 325 MG/1; MG/1
1 TABLET ORAL EVERY 8 HOURS PRN
Qty: 75 TABLET | Refills: 0 | Status: CANCELLED | OUTPATIENT
Start: 2024-03-18

## 2024-03-18 RX ORDER — HYDROCODONE BITARTRATE AND ACETAMINOPHEN 10; 325 MG/1; MG/1
1 TABLET ORAL EVERY 8 HOURS PRN
Qty: 90 TABLET | Refills: 0 | Status: SHIPPED | OUTPATIENT
Start: 2024-03-18 | End: 2024-04-17 | Stop reason: SDUPTHER

## 2024-03-29 DIAGNOSIS — I10 PRIMARY HYPERTENSION: ICD-10-CM

## 2024-03-29 RX ORDER — LISINOPRIL 40 MG/1
40 TABLET ORAL DAILY
Qty: 30 TABLET | Refills: 11 | Status: SHIPPED | OUTPATIENT
Start: 2024-03-29 | End: 2025-03-29

## 2024-04-01 ENCOUNTER — TELEPHONE (OUTPATIENT)
Dept: PRIMARY CARE | Facility: CLINIC | Age: 59
End: 2024-04-01
Payer: MEDICAID

## 2024-04-01 NOTE — TELEPHONE ENCOUNTER
Pt called stating Dr. Vazquez did a number of tests which showed gallstones and fatty liver; he wanted you to review these tests

## 2024-04-17 DIAGNOSIS — S32.040G COMPRESSION FRACTURE OF L4 VERTEBRA WITH DELAYED HEALING, SUBSEQUENT ENCOUNTER: ICD-10-CM

## 2024-04-17 RX ORDER — HYDROCODONE BITARTRATE AND ACETAMINOPHEN 10; 325 MG/1; MG/1
1 TABLET ORAL EVERY 8 HOURS PRN
Qty: 90 TABLET | Refills: 0 | Status: SHIPPED | OUTPATIENT
Start: 2024-04-17 | End: 2024-05-14 | Stop reason: SDUPTHER
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
## 2024-04-19 RX ORDER — PREDNISONE 20 MG/1
TABLET ORAL
Qty: 18 TABLET | Refills: 0 | Status: SHIPPED | OUTPATIENT
Start: 2024-04-19 | End: 2024-05-14 | Stop reason: ALTCHOICE

## 2024-04-24 ENCOUNTER — APPOINTMENT (OUTPATIENT)
Dept: PRIMARY CARE | Facility: CLINIC | Age: 59
End: 2024-04-24
Payer: MEDICAID

## 2024-04-29 ENCOUNTER — APPOINTMENT (OUTPATIENT)
Dept: RADIOLOGY | Facility: HOSPITAL | Age: 59
End: 2024-04-29
Payer: MEDICAID

## 2024-04-29 ENCOUNTER — HOSPITAL ENCOUNTER (EMERGENCY)
Facility: HOSPITAL | Age: 59
Discharge: HOME | End: 2024-04-29
Payer: MEDICAID

## 2024-04-29 VITALS
BODY MASS INDEX: 44.1 KG/M2 | RESPIRATION RATE: 16 BRPM | TEMPERATURE: 98.2 F | HEART RATE: 83 BPM | SYSTOLIC BLOOD PRESSURE: 104 MMHG | OXYGEN SATURATION: 93 % | WEIGHT: 315 LBS | DIASTOLIC BLOOD PRESSURE: 57 MMHG | HEIGHT: 71 IN

## 2024-04-29 DIAGNOSIS — M17.11 ARTHRITIS OF RIGHT KNEE: ICD-10-CM

## 2024-04-29 DIAGNOSIS — M25.561 ACUTE PAIN OF RIGHT KNEE: Primary | ICD-10-CM

## 2024-04-29 PROCEDURE — 73560 X-RAY EXAM OF KNEE 1 OR 2: CPT | Mod: RT

## 2024-04-29 PROCEDURE — 99283 EMERGENCY DEPT VISIT LOW MDM: CPT

## 2024-04-29 PROCEDURE — 73560 X-RAY EXAM OF KNEE 1 OR 2: CPT | Mod: RIGHT SIDE | Performed by: RADIOLOGY

## 2024-04-29 RX ORDER — CALCIUM CARBONATE 300MG(750)
400 TABLET,CHEWABLE ORAL DAILY
COMMUNITY

## 2024-04-29 RX ORDER — CHOLECALCIFEROL (VITD3)/VIT K2 137.5-2
1 TABLET ORAL DAILY
COMMUNITY

## 2024-04-29 ASSESSMENT — PAIN - FUNCTIONAL ASSESSMENT: PAIN_FUNCTIONAL_ASSESSMENT: 0-10

## 2024-04-29 ASSESSMENT — PAIN SCALES - GENERAL: PAINLEVEL_OUTOF10: 0 - NO PAIN

## 2024-04-29 NOTE — ED PROVIDER NOTES
Antonio Hamilton is a 44year old female. HPI:   This visit is conducted using Telemedicine with live, interactive video and audio. ADD follow up. Doing well on medications, taking as prescribed. Denies side effects.  Reports more irritability, tearfu HPI   Chief Complaint   Patient presents with    Knee Pain     Right knee pain x 1 week. States one week ago he was having pain and fell while trying to get to the bathroom. Pain continues to worsen with weight bearing/ambulation.        Patient presents with right knee pain.  This been ongoing for a week.  States that he stood up from the couch and had sudden onset of pain and then stumbled hitting his need to a coffee table.  States he has no pain as long as he is seated.  He develops discomfort when he gets up to ambulate.  States currently in the ED he does not have any pain as he took a Norco prior to arrival.  He denies any swelling or bruising.  He has not noticed a deformity.  Patient denies any other injury from fall.      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  Social History     Tobacco Use    Smoking status: Never    Smokeless tobacco: Current     Types: Chew   Vaping Use    Vaping status: Never Used    Substance Use Topics    Alcohol use: Yes     Alcohol/week: 12.0 standard drinks of alcohol     Types: 12 Cans of beer per week    Drug use: Never       Physical Exam   ED Triage Vitals [24 1111]   Temperature Heart Rate Respirations BP   36.8 °C (98.2 °F) 94 17 130/57      Pulse Ox Temp Source Heart Rate Source Patient Position   96 % Temporal -- --      BP Location FiO2 (%)     -- --       Physical Exam  Vitals and nursing note reviewed.   Constitutional:       General: He is not in acute distress.     Appearance: Normal appearance. He is morbidly obese. He is not ill-appearing or toxic-appearing.   HENT:      Head: Normocephalic.      Right Ear: External ear normal.      Left Ear: External ear normal.      Nose: Nose normal.      Mouth/Throat:      Lips: Pink. No lesions.      Mouth: Mucous membranes are moist.   Eyes:      General: No scleral icterus.     Conjunctiva/sclera: Conjunctivae normal.   Musculoskeletal:         General: No swelling or deformity.      Right knee: No swelling, deformity, effusion, erythema, ecchymosis, lacerations, bony tenderness or crepitus. Decreased range of motion. Tenderness present over the medial joint line.      Right lower le+ Pitting Edema present.      Left lower le+ Pitting Edema present.      Comments: Brawny edema noted bilaterally.  Patient verbalizes that this has not acutely changed.    Unable to perform special test due to patient's body habitus.   Skin:     General: Skin is warm.      Capillary Refill: Capillary refill takes less than 2 seconds.      Findings: No bruising.   Neurological:      General: No focal deficit present.      Mental Status: He is alert and oriented to person, place, and time.      Cranial Nerves: No cranial nerve deficit or facial asymmetry.      Sensory: No sensory deficit.      Motor: No weakness.   Psychiatric:         Attention and Perception: Attention and perception normal.         Mood and Affect: Mood and affect normal.   • History of depression 2008   • HYPOTHYROIDISM    • Leaking of urine Recent   • Stress 2008   • Thyroid disease 2008   • Wears glasses Reading      Social History:  Social History    Tobacco Use      Smoking status: Former Smoker        Packs/day: 1.00        Speech: Speech normal.         Behavior: Behavior normal. Behavior is cooperative.         Thought Content: Thought content normal.         Cognition and Memory: Cognition and memory normal.         Judgment: Judgment normal.         ED Course & MDM   Diagnoses as of 04/29/24 1210   Acute pain of right knee   Arthritis of right knee       Medical Decision Making  Patient presents with right knee pain.  This been ongoing for a week.  States that he stood up from the couch and had sudden onset of pain and then stumbled hitting his need to a coffee table.  States he has no pain as long as he is seated.  He develops discomfort when he gets up to ambulate.  States currently in the ED he does not have any pain as he took a Norco prior to arrival.  He denies any swelling or bruising.  He has not noticed a deformity.  Patient denies any other injury from fall.    Ddx: Fracture, contusion, strain, sprain, other    Will obtain x-rays  Again patient declined pain medication while in the ED stating that he took a Norco prior to arrival and has adequate pain relief at this time.  No acute concerns were noted by x-ray.  Tricompartmental arthritis was noted.  Discussed findings with patient.  He would like to try crutches.  He was encouraged to be very careful on them.  Will Ace wrap the knee and he can follow-up with orthopedics as soon as you need an appointment.  Patient discharged home in improved stable condition    Amount and/or Complexity of Data Reviewed  Radiology: ordered and independent interpretation performed. Decision-making details documented in ED Course.    Risk  OTC drugs.  Diagnosis or treatment significantly limited by social determinants of health.        Procedure  Procedures     Bonnie Deng PA-C  04/29/24 1210

## 2024-04-29 NOTE — DISCHARGE INSTRUCTIONS
Be careful on the crutches.  Use as needed.  Ace wrap as needed.  Continue pain medications as directed.  Follow-up with orthopedics as soon as you get an appointment.

## 2024-05-14 ENCOUNTER — OFFICE VISIT (OUTPATIENT)
Dept: PRIMARY CARE | Facility: CLINIC | Age: 59
End: 2024-05-14
Payer: MEDICAID

## 2024-05-14 VITALS
SYSTOLIC BLOOD PRESSURE: 132 MMHG | WEIGHT: 315 LBS | BODY MASS INDEX: 44.1 KG/M2 | HEART RATE: 88 BPM | DIASTOLIC BLOOD PRESSURE: 77 MMHG | HEIGHT: 71 IN

## 2024-05-14 DIAGNOSIS — R13.10 DYSPHAGIA, UNSPECIFIED TYPE: Primary | ICD-10-CM

## 2024-05-14 DIAGNOSIS — E87.1 HYPONATREMIA: ICD-10-CM

## 2024-05-14 DIAGNOSIS — S32.040G COMPRESSION FRACTURE OF L4 VERTEBRA WITH DELAYED HEALING, SUBSEQUENT ENCOUNTER: ICD-10-CM

## 2024-05-14 DIAGNOSIS — S91.332D PENETRATING WOUND OF LEFT FOOT, SUBSEQUENT ENCOUNTER: ICD-10-CM

## 2024-05-14 PROCEDURE — 99214 OFFICE O/P EST MOD 30 MIN: CPT | Performed by: INTERNAL MEDICINE

## 2024-05-14 PROCEDURE — 3078F DIAST BP <80 MM HG: CPT | Performed by: INTERNAL MEDICINE

## 2024-05-14 PROCEDURE — 3075F SYST BP GE 130 - 139MM HG: CPT | Performed by: INTERNAL MEDICINE

## 2024-05-14 RX ORDER — HYDROCODONE BITARTRATE AND ACETAMINOPHEN 10; 325 MG/1; MG/1
1 TABLET ORAL EVERY 8 HOURS PRN
Qty: 90 TABLET | Refills: 0 | Status: SHIPPED | OUTPATIENT
Start: 2024-05-14

## 2024-05-14 ASSESSMENT — ENCOUNTER SYMPTOMS
APPETITE CHANGE: 0
COUGH: 0
SINUS PRESSURE: 0
BACK PAIN: 1
ACTIVITY CHANGE: 0
VOMITING: 0
WEAKNESS: 0
ABDOMINAL DISTENTION: 0
DIARRHEA: 0
SINUS PAIN: 0
SORE THROAT: 0
CHILLS: 0
NAUSEA: 0
FATIGUE: 0
WHEEZING: 0
SHORTNESS OF BREATH: 0
WOUND: 1
NUMBNESS: 0

## 2024-05-14 ASSESSMENT — PATIENT HEALTH QUESTIONNAIRE - PHQ9
SUM OF ALL RESPONSES TO PHQ9 QUESTIONS 1 AND 2: 0
1. LITTLE INTEREST OR PLEASURE IN DOING THINGS: NOT AT ALL
2. FEELING DOWN, DEPRESSED OR HOPELESS: NOT AT ALL

## 2024-05-14 NOTE — PROGRESS NOTES
"Subjective   Patient ID: Tr Arnold is a 58 y.o. male who presents for Follow-up (3 MONTH), Leg Swelling, and Ankle Injury (Pt reports wound on LT ankle ).  Ankle Injury   Pertinent negatives include no numbness.     Patient is here today for 3 mo follow up     Patient reports a wound on the bottom of his left foot.   He has been putting salve on it.   Pt denies any injury that he can think of.     Pt reports worsening painful dysphagia.     Review of Systems   Constitutional:  Negative for activity change, appetite change, chills and fatigue.   HENT:  Negative for congestion, postnasal drip, sinus pressure, sinus pain and sore throat.    Respiratory:  Negative for cough, shortness of breath and wheezing.    Cardiovascular:  Negative for chest pain and leg swelling.   Gastrointestinal:  Negative for abdominal distention, diarrhea, nausea and vomiting.   Musculoskeletal:  Positive for back pain.   Skin:  Positive for wound.   Neurological:  Negative for weakness and numbness.       Objective   /77   Pulse 88   Ht 1.803 m (5' 11\")   Wt 147 kg (324 lb)   BMI 45.19 kg/m²     Physical Exam  Constitutional:       General: He is not in acute distress.     Appearance: Normal appearance.   HENT:      Head: Normocephalic.      Nose: Nose normal.      Mouth/Throat:      Mouth: Mucous membranes are dry.      Pharynx: No oropharyngeal exudate.   Eyes:      General:         Right eye: No discharge.         Left eye: No discharge.      Extraocular Movements: Extraocular movements intact.      Pupils: Pupils are equal, round, and reactive to light.   Cardiovascular:      Rate and Rhythm: Normal rate and regular rhythm.      Heart sounds: No murmur heard.     No gallop.   Pulmonary:      Effort: Pulmonary effort is normal. No respiratory distress.      Breath sounds: Normal breath sounds. No wheezing.   Musculoskeletal:         General: No swelling. Normal range of motion.   Skin:     General: Skin is warm and " dry.      Coloration: Skin is not jaundiced.      Comments: +dime sized blister with skin deroofing on left heel    Neurological:      General: No focal deficit present.      Mental Status: He is alert and oriented to person, place, and time.      Cranial Nerves: No cranial nerve deficit.   Psychiatric:         Mood and Affect: Mood normal.         Behavior: Behavior normal.         OARRS:  No data recorded  I have personally reviewed the OARRS report for Tr Arnold. I have considered the risks of abuse, dependence, addiction and diversion     Is the patient prescribed a combination of a benzodiazepine and opioid?  No     Last Urine Drug Screen / ordered today: Yes  Recent Results             Recent Results (from the past 18953 hour(s))   OPIATE/OPIOID/BENZO PRESCRIPTION COMPLIANCE     Collection Time: 04/22/22  9:00 AM   Result Value Ref Range     DRUG SCREEN COMMENT URINE SEE BELOW       Creatine, Urine 34.5 mg/dL     Amphetamine Screen, Urine PRESUMPTIVE NEGATIVE NEGATIVE     Barbiturate Screen, Urine PRESUMPTIVE NEGATIVE NEGATIVE     Cannabinoid Screen, Urine PRESUMPTIVE NEGATIVE NEGATIVE     Cocaine Screen, Urine PRESUMPTIVE NEGATIVE NEGATIVE     PCP Screen, Urine PRESUMPTIVE NEGATIVE NEGATIVE     7-Aminoclonazepam <25 Cutoff <25 ng/mL     Alpha-Hydroxyalprazolam <25 Cutoff <25 ng/mL     Alpha-Hydroxymidazolam <25 Cutoff <25 ng/mL     Alprazolam <25 Cutoff <25 ng/mL     Chlordiazepoxide <25 Cutoff <25 ng/mL     Clonazepam <25 Cutoff <25 ng/mL     Diazepam <25 Cutoff <25 ng/mL     Lorazepam <25 Cutoff <25 ng/mL     Midazolam <25 Cutoff <25 ng/mL     Nordiazepam <25 Cutoff <25 ng/mL     Oxazepam <25 Cutoff <25 ng/mL     Temazepam <25 Cutoff <25 ng/mL     Zolpidem <25 Cutoff <25 ng/mL     Zolpidem Metabolite (ZCA) <25 Cutoff <25 ng/mL     6-Acetylmorphine <25 Cutoff <25 ng/mL     Codeine <50 Cutoff <50 ng/mL     Hydrocodone 424 (A) Cutoff <25 ng/mL     Hydromorphone 113 (A) Cutoff <25 ng/mL     Morphine  Urine <50 Cutoff <50 ng/mL     Norhydrocodone 181 (A) Cutoff <25 ng/mL     Noroxycodone <25 Cutoff <25 ng/mL     Oxycodone <25 Cutoff <25 ng/mL     Oxymorphone <25 Cutoff <25 ng/mL     Tramadol <50 Cutoff <50 ng/mL     O-Desmethyltramadol <50 Cutoff <50 ng/mL     Fentanyl <2.5 Cutoff<2.5 ng/mL     Norfentanyl <2.5 Cutoff<2.5 ng/mL     METHADONE CONFIRMATION,URINE <25 Cutoff <25 ng/mL     EDDP <25 Cutoff <25 ng/mL         N/A     Controlled Substance Agreement:  Date of the Last Agreement: 07/21/2023  Reviewed Controlled Substance Agreement including but not limited to the benefits, risks, and alternatives to treatment with a Controlled Substance medication(s).     Opioids:  What is the patient's goal of therapy? Improvement of chronic pain              Is this being achieved with current treatment? yes     I have calculated the patient's Morphine Dose Equivalent (MED):   I have considered referral to Pain Management and/or a specialist, and do not feel it is necessary at this time.     I feel that it is clinically indicated to continue this current medication regimen after consideration of alternative therapies, and other non-opioid treatment.     Opioid Risk Screening:  Family History of Substance Abuse  Alcohol: 0 (7/21/2023  9:00 AM)  Illegal Drugs: 0 (7/21/2023  9:00 AM)  Prescription Drugs: 0 (7/21/2023  9:00 AM)     Personal History of Substance Abuse  Alcohol: 3 (7/21/2023  9:00 AM)  Illegal Drugs: 0 (7/21/2023  9:00 AM)  Prescription Drugs: 0 (7/21/2023  9:00 AM)     Patient Age (16-45)  Age (16-45): 0 (7/21/2023  9:00 AM)     History of Preadolescent Sexual Abuse  History of Preadolescent Sexual Abuse: .0 (7/21/2023  9:00 AM)     Psychological Disease  Attention Deficit Disorder, Obsessive Compulsive Disorder, Bipolar, Schizophrenia: 0 (7/21/2023  9:00 AM)  Depression: 1 (7/21/2023  9:00 AM)     Total Score  Total Score: 4 (7/21/2023  9:00 AM)     Total Score Risk Category  TOTAL SCORE CATEGORY: Moderate  Risk (4-7) (7/21/2023  9:00 AM)           Pain Assessment:  No data recorded          Assessment/Plan   Problem List Items Addressed This Visit       Hyponatremia    Relevant Orders    Comprehensive Metabolic Panel     Other Visit Diagnoses       Dysphagia, unspecified type    -  Primary    Relevant Orders    Referral to Gastroenterology    Compression fracture of L4 vertebra with delayed healing, subsequent encounter        Relevant Medications    HYDROcodone-acetaminophen (Norco)  mg tablet    Penetrating wound of left foot, subsequent encounter        Relevant Orders    Referral to Podiatry        1. Left avascular necrosis s/p hip replacement, found to have lumbar compression fracture, was referred to Neurosurgery but because he had lost insurance was not able to pursue at that time, did eventually see Neurosurgery and recommended seeing pain management for injections  - had injection with pain management > 1 year ago   - on lyrica 75mg po tid   - CSA and UDS up to date, due at next appt    -continue percocet 10/325mg po tid prn   - I have personally reviewed this patient's OARRS report and found it to be appropriate. The report has been uploaded into the medical record. I have considered the risks of abuse, addiction, dependence, and diversion and feel that it is clinically appropriate for this patient to be prescribed this controlled medication.      2. Depression, stable   - continue Cymbalta 60mg po daily      3. HTN , controlled   - continue lisinopril 40mg po daily      4. Chronic venous stasis changes on maribell legs  - can continue Lasix as needed 40mg po tid , use compression stockings   - schedule appt with Dr Tomiln     5. Gout , controlled   - continue allopurinol   - mitigare prn in times of flare      6. Hyponatremia 2/2 alcohol    - following with nephrology, 12/23 Na was 124  - has cut back on his alcohol  - taking na tabs        - recheck cmp, may need to change lasix to torsemide     7.  Osteoporosis, found to have low T, multifactorial with drinking alcohol and low t  - on testosterone replacement  - calcium and vit d   - repeat bone density in 2 years         8. Foot wound   - referral to podiatry   Final diagnoses:   [S32.040G] Compression fracture of L4 vertebra with delayed healing, subsequent encounter   [R13.10] Dysphagia, unspecified type   [E87.1] Hyponatremia   [S91.332D] Penetrating wound of left foot, subsequent encounter

## 2024-05-15 DIAGNOSIS — E87.1 HYPONATREMIA: ICD-10-CM

## 2024-05-15 DIAGNOSIS — R13.10 DYSPHAGIA, UNSPECIFIED TYPE: ICD-10-CM

## 2024-05-16 RX ORDER — SODIUM CHLORIDE 1 G/1
3 TABLET ORAL 3 TIMES DAILY
Qty: 810 TABLET | Refills: 3 | Status: SHIPPED | OUTPATIENT
Start: 2024-05-16 | End: 2025-05-16

## 2024-05-20 ENCOUNTER — APPOINTMENT (OUTPATIENT)
Dept: UROLOGY | Facility: CLINIC | Age: 59
End: 2024-05-20
Payer: MEDICAID

## 2024-05-20 ENCOUNTER — TELEPHONE (OUTPATIENT)
Dept: UROLOGY | Facility: CLINIC | Age: 59
End: 2024-05-20

## 2024-05-20 DIAGNOSIS — N52.9 ERECTILE DYSFUNCTION, UNSPECIFIED ERECTILE DYSFUNCTION TYPE: ICD-10-CM

## 2024-06-06 ENCOUNTER — TELEPHONE (OUTPATIENT)
Dept: PRIMARY CARE | Facility: CLINIC | Age: 59
End: 2024-06-06
Payer: MEDICAID

## 2024-06-06 DIAGNOSIS — R68.2 DRY MOUTH: ICD-10-CM

## 2024-06-06 RX ORDER — FLUORIDE TOOTHPASTE
15 TOOTHPASTE DENTAL 3 TIMES DAILY PRN
Qty: 473 ML | Refills: 5 | Status: SHIPPED | OUTPATIENT
Start: 2024-06-06

## 2024-06-06 NOTE — TELEPHONE ENCOUNTER
Having issues with dry mouth and taking his pills even with water; states he wants to choke them back up

## 2024-06-13 DIAGNOSIS — S32.040G COMPRESSION FRACTURE OF L4 VERTEBRA WITH DELAYED HEALING, SUBSEQUENT ENCOUNTER: ICD-10-CM

## 2024-06-13 RX ORDER — HYDROCODONE BITARTRATE AND ACETAMINOPHEN 10; 325 MG/1; MG/1
1 TABLET ORAL EVERY 8 HOURS PRN
Qty: 90 TABLET | Refills: 0 | Status: ON HOLD | OUTPATIENT
Start: 2024-06-13

## 2024-06-14 ENCOUNTER — HOSPITAL ENCOUNTER (INPATIENT)
Facility: HOSPITAL | Age: 59
End: 2024-06-14
Attending: EMERGENCY MEDICINE | Admitting: INTERNAL MEDICINE
Payer: MEDICAID

## 2024-06-14 ENCOUNTER — APPOINTMENT (OUTPATIENT)
Dept: RADIOLOGY | Facility: HOSPITAL | Age: 59
End: 2024-06-14
Payer: MEDICAID

## 2024-06-14 ENCOUNTER — LAB (OUTPATIENT)
Dept: LAB | Facility: LAB | Age: 59
End: 2024-06-14
Payer: MEDICAID

## 2024-06-14 ENCOUNTER — TELEPHONE (OUTPATIENT)
Dept: PRIMARY CARE | Facility: CLINIC | Age: 59
End: 2024-06-14

## 2024-06-14 ENCOUNTER — APPOINTMENT (OUTPATIENT)
Dept: CARDIOLOGY | Facility: HOSPITAL | Age: 59
End: 2024-06-14
Payer: MEDICAID

## 2024-06-14 DIAGNOSIS — E87.1 HYPONATREMIA: ICD-10-CM

## 2024-06-14 DIAGNOSIS — E29.1 HYPOGONADISM IN MALE: ICD-10-CM

## 2024-06-14 DIAGNOSIS — R35.1 NOCTURIA: ICD-10-CM

## 2024-06-14 DIAGNOSIS — N52.9 ERECTILE DYSFUNCTION, UNSPECIFIED ERECTILE DYSFUNCTION TYPE: ICD-10-CM

## 2024-06-14 DIAGNOSIS — E87.6 HYPOKALEMIA: Primary | ICD-10-CM

## 2024-06-14 DIAGNOSIS — E87.6 HYPOKALEMIA: ICD-10-CM

## 2024-06-14 DIAGNOSIS — F10.10 ALCOHOL ABUSE: ICD-10-CM

## 2024-06-14 DIAGNOSIS — E83.42 HYPOMAGNESEMIA: ICD-10-CM

## 2024-06-14 LAB
ALBUMIN SERPL BCP-MCNC: 3 G/DL (ref 3.4–5)
ALBUMIN SERPL BCP-MCNC: 3.2 G/DL (ref 3.4–5)
ALP SERPL-CCNC: 160 U/L (ref 33–136)
ALP SERPL-CCNC: 170 U/L (ref 33–136)
ALT SERPL W P-5'-P-CCNC: 18 U/L (ref 10–52)
ALT SERPL W P-5'-P-CCNC: 19 U/L (ref 10–52)
ANION GAP SERPL CALC-SCNC: 11 MMOL/L (ref 10–20)
ANION GAP SERPL CALC-SCNC: 12 MMOL/L (ref 10–20)
APPEARANCE UR: CLEAR
AST SERPL W P-5'-P-CCNC: 37 U/L (ref 9–39)
AST SERPL W P-5'-P-CCNC: 40 U/L (ref 9–39)
BACTERIA #/AREA URNS AUTO: ABNORMAL /HPF
BASOPHILS # BLD AUTO: 0.04 X10*3/UL (ref 0–0.1)
BASOPHILS NFR BLD AUTO: 0.6 %
BILIRUB SERPL-MCNC: 4 MG/DL (ref 0–1.2)
BILIRUB SERPL-MCNC: 4.4 MG/DL (ref 0–1.2)
BILIRUB UR STRIP.AUTO-MCNC: ABNORMAL MG/DL
BUN SERPL-MCNC: 5 MG/DL (ref 6–23)
BUN SERPL-MCNC: 5 MG/DL (ref 6–23)
CALCIUM SERPL-MCNC: 8.4 MG/DL (ref 8.6–10.3)
CALCIUM SERPL-MCNC: 8.5 MG/DL (ref 8.6–10.3)
CARDIAC TROPONIN I PNL SERPL HS: 5 NG/L (ref 0–20)
CARDIAC TROPONIN I PNL SERPL HS: 5 NG/L (ref 0–20)
CHLORIDE SERPL-SCNC: 90 MMOL/L (ref 98–107)
CHLORIDE SERPL-SCNC: 90 MMOL/L (ref 98–107)
CO2 SERPL-SCNC: 28 MMOL/L (ref 21–32)
CO2 SERPL-SCNC: 29 MMOL/L (ref 21–32)
COLOR UR: YELLOW
CREAT SERPL-MCNC: 0.78 MG/DL (ref 0.5–1.3)
CREAT SERPL-MCNC: 0.82 MG/DL (ref 0.5–1.3)
EGFRCR SERPLBLD CKD-EPI 2021: >90 ML/MIN/1.73M*2
EGFRCR SERPLBLD CKD-EPI 2021: >90 ML/MIN/1.73M*2
EOSINOPHIL # BLD AUTO: 0.13 X10*3/UL (ref 0–0.7)
EOSINOPHIL NFR BLD AUTO: 2 %
ERYTHROCYTE [DISTWIDTH] IN BLOOD BY AUTOMATED COUNT: 14.9 % (ref 11.5–14.5)
GLUCOSE SERPL-MCNC: 109 MG/DL (ref 74–99)
GLUCOSE SERPL-MCNC: 115 MG/DL (ref 74–99)
GLUCOSE UR STRIP.AUTO-MCNC: NORMAL MG/DL
HCT VFR BLD AUTO: 27.8 % (ref 41–52)
HCT VFR BLD AUTO: 30.3 % (ref 41–52)
HGB BLD-MCNC: 10.2 G/DL (ref 13.5–17.5)
HGB BLD-MCNC: 9.4 G/DL (ref 13.5–17.5)
IMM GRANULOCYTES # BLD AUTO: 0.05 X10*3/UL (ref 0–0.7)
IMM GRANULOCYTES NFR BLD AUTO: 0.8 % (ref 0–0.9)
KETONES UR STRIP.AUTO-MCNC: NEGATIVE MG/DL
LEUKOCYTE ESTERASE UR QL STRIP.AUTO: NEGATIVE
LIPASE SERPL-CCNC: 31 U/L (ref 9–82)
LYMPHOCYTES # BLD AUTO: 0.88 X10*3/UL (ref 1.2–4.8)
LYMPHOCYTES NFR BLD AUTO: 13.3 %
MAGNESIUM SERPL-MCNC: 1.34 MG/DL (ref 1.6–2.4)
MCH RBC QN AUTO: 32.4 PG (ref 26–34)
MCHC RBC AUTO-ENTMCNC: 33.8 G/DL (ref 32–36)
MCV RBC AUTO: 96 FL (ref 80–100)
MONOCYTES # BLD AUTO: 1.04 X10*3/UL (ref 0.1–1)
MONOCYTES NFR BLD AUTO: 15.7 %
NEUTROPHILS # BLD AUTO: 4.5 X10*3/UL (ref 1.2–7.7)
NEUTROPHILS NFR BLD AUTO: 67.6 %
NITRITE UR QL STRIP.AUTO: NEGATIVE
NRBC BLD-RTO: 0 /100 WBCS (ref 0–0)
PH UR STRIP.AUTO: 7 [PH]
PLATELET # BLD AUTO: 183 X10*3/UL (ref 150–450)
POTASSIUM SERPL-SCNC: 2.3 MMOL/L (ref 3.5–5.3)
POTASSIUM SERPL-SCNC: 2.4 MMOL/L (ref 3.5–5.3)
PROT SERPL-MCNC: 6.1 G/DL (ref 6.4–8.2)
PROT SERPL-MCNC: 6.1 G/DL (ref 6.4–8.2)
PROT UR STRIP.AUTO-MCNC: ABNORMAL MG/DL
PSA SERPL-MCNC: 0.35 NG/ML
RBC # BLD AUTO: 2.9 X10*6/UL (ref 4.5–5.9)
RBC # UR STRIP.AUTO: NEGATIVE /UL
RBC #/AREA URNS AUTO: ABNORMAL /HPF
SODIUM SERPL-SCNC: 127 MMOL/L (ref 136–145)
SODIUM SERPL-SCNC: 129 MMOL/L (ref 136–145)
SP GR UR STRIP.AUTO: 1.03
TESTOST SERPL-MCNC: 348 NG/DL (ref 240–1000)
UROBILINOGEN UR STRIP.AUTO-MCNC: ABNORMAL MG/DL
WBC # BLD AUTO: 6.6 X10*3/UL (ref 4.4–11.3)
WBC #/AREA URNS AUTO: ABNORMAL /HPF
WBC CLUMPS #/AREA URNS AUTO: ABNORMAL /HPF

## 2024-06-14 PROCEDURE — 2500000004 HC RX 250 GENERAL PHARMACY W/ HCPCS (ALT 636 FOR OP/ED): Performed by: NURSE PRACTITIONER

## 2024-06-14 PROCEDURE — 80053 COMPREHEN METABOLIC PANEL: CPT

## 2024-06-14 PROCEDURE — 99285 EMERGENCY DEPT VISIT HI MDM: CPT

## 2024-06-14 PROCEDURE — 85014 HEMATOCRIT: CPT

## 2024-06-14 PROCEDURE — 74177 CT ABD & PELVIS W/CONTRAST: CPT

## 2024-06-14 PROCEDURE — 84484 ASSAY OF TROPONIN QUANT: CPT | Performed by: NURSE PRACTITIONER

## 2024-06-14 PROCEDURE — 2500000002 HC RX 250 W HCPCS SELF ADMINISTERED DRUGS (ALT 637 FOR MEDICARE OP, ALT 636 FOR OP/ED): Performed by: NURSE PRACTITIONER

## 2024-06-14 PROCEDURE — 87086 URINE CULTURE/COLONY COUNT: CPT | Mod: SAMLAB | Performed by: NURSE PRACTITIONER

## 2024-06-14 PROCEDURE — 76705 ECHO EXAM OF ABDOMEN: CPT | Mod: FOREIGN READ | Performed by: RADIOLOGY

## 2024-06-14 PROCEDURE — 81001 URINALYSIS AUTO W/SCOPE: CPT | Performed by: NURSE PRACTITIONER

## 2024-06-14 PROCEDURE — 83735 ASSAY OF MAGNESIUM: CPT | Performed by: NURSE PRACTITIONER

## 2024-06-14 PROCEDURE — 71045 X-RAY EXAM CHEST 1 VIEW: CPT | Performed by: RADIOLOGY

## 2024-06-14 PROCEDURE — 2550000001 HC RX 255 CONTRASTS: Performed by: EMERGENCY MEDICINE

## 2024-06-14 PROCEDURE — 74177 CT ABD & PELVIS W/CONTRAST: CPT | Performed by: RADIOLOGY

## 2024-06-14 PROCEDURE — 85018 HEMOGLOBIN: CPT

## 2024-06-14 PROCEDURE — 96365 THER/PROPH/DIAG IV INF INIT: CPT

## 2024-06-14 PROCEDURE — 71045 X-RAY EXAM CHEST 1 VIEW: CPT

## 2024-06-14 PROCEDURE — 96361 HYDRATE IV INFUSION ADD-ON: CPT

## 2024-06-14 PROCEDURE — 96368 THER/DIAG CONCURRENT INF: CPT

## 2024-06-14 PROCEDURE — 2500000001 HC RX 250 WO HCPCS SELF ADMINISTERED DRUGS (ALT 637 FOR MEDICARE OP): Performed by: INTERNAL MEDICINE

## 2024-06-14 PROCEDURE — 1200000002 HC GENERAL ROOM WITH TELEMETRY DAILY

## 2024-06-14 PROCEDURE — 96366 THER/PROPH/DIAG IV INF ADDON: CPT

## 2024-06-14 PROCEDURE — 80053 COMPREHEN METABOLIC PANEL: CPT | Performed by: NURSE PRACTITIONER

## 2024-06-14 PROCEDURE — 76705 ECHO EXAM OF ABDOMEN: CPT

## 2024-06-14 PROCEDURE — 99223 1ST HOSP IP/OBS HIGH 75: CPT | Performed by: INTERNAL MEDICINE

## 2024-06-14 PROCEDURE — 36415 COLL VENOUS BLD VENIPUNCTURE: CPT | Performed by: NURSE PRACTITIONER

## 2024-06-14 PROCEDURE — 83690 ASSAY OF LIPASE: CPT | Performed by: NURSE PRACTITIONER

## 2024-06-14 PROCEDURE — 85025 COMPLETE CBC W/AUTO DIFF WBC: CPT | Performed by: NURSE PRACTITIONER

## 2024-06-14 PROCEDURE — 93005 ELECTROCARDIOGRAM TRACING: CPT

## 2024-06-14 RX ORDER — POTASSIUM CHLORIDE 14.9 MG/ML
20 INJECTION INTRAVENOUS ONCE
Status: COMPLETED | OUTPATIENT
Start: 2024-06-14 | End: 2024-06-14

## 2024-06-14 RX ORDER — POTASSIUM CHLORIDE 20 MEQ/1
20 TABLET, EXTENDED RELEASE ORAL 3 TIMES DAILY
Qty: 6 TABLET | Refills: 0 | Status: ON HOLD | OUTPATIENT
Start: 2024-06-14 | End: 2024-06-17

## 2024-06-14 RX ORDER — MAGNESIUM SULFATE HEPTAHYDRATE 40 MG/ML
2 INJECTION, SOLUTION INTRAVENOUS ONCE
Status: COMPLETED | OUTPATIENT
Start: 2024-06-14 | End: 2024-06-14

## 2024-06-14 RX ORDER — SODIUM CHLORIDE 9 MG/ML
125 INJECTION, SOLUTION INTRAVENOUS CONTINUOUS
Status: DISCONTINUED | OUTPATIENT
Start: 2024-06-14 | End: 2024-06-15

## 2024-06-14 RX ORDER — HYDROCODONE BITARTRATE AND ACETAMINOPHEN 5; 325 MG/1; MG/1
1 TABLET ORAL ONCE
Status: COMPLETED | OUTPATIENT
Start: 2024-06-14 | End: 2024-06-14

## 2024-06-14 RX ORDER — POTASSIUM CHLORIDE 20 MEQ/1
40 TABLET, EXTENDED RELEASE ORAL ONCE
Status: COMPLETED | OUTPATIENT
Start: 2024-06-14 | End: 2024-06-14

## 2024-06-14 RX ORDER — MUPIROCIN 20 MG/G
OINTMENT TOPICAL
COMMUNITY

## 2024-06-14 RX ADMIN — IOHEXOL 90 ML: 350 INJECTION, SOLUTION INTRAVENOUS at 16:40

## 2024-06-14 RX ADMIN — SODIUM CHLORIDE 500 ML: 9 INJECTION, SOLUTION INTRAVENOUS at 18:40

## 2024-06-14 RX ADMIN — SODIUM CHLORIDE 500 ML: 9 INJECTION, SOLUTION INTRAVENOUS at 14:36

## 2024-06-14 RX ADMIN — POTASSIUM CHLORIDE 40 MEQ: 1500 TABLET, EXTENDED RELEASE ORAL at 15:26

## 2024-06-14 RX ADMIN — SODIUM CHLORIDE 125 ML/HR: 9 INJECTION, SOLUTION INTRAVENOUS at 19:38

## 2024-06-14 RX ADMIN — POTASSIUM CHLORIDE 20 MEQ: 14.9 INJECTION, SOLUTION INTRAVENOUS at 14:42

## 2024-06-14 RX ADMIN — HYDROCODONE BITARTRATE AND ACETAMINOPHEN 1 TABLET: 5; 325 TABLET ORAL at 23:32

## 2024-06-14 RX ADMIN — MAGNESIUM SULFATE HEPTAHYDRATE 2 G: 40 INJECTION, SOLUTION INTRAVENOUS at 15:41

## 2024-06-14 SDOH — ECONOMIC STABILITY: HOUSING INSECURITY
IN THE LAST 12 MONTHS, WAS THERE A TIME WHEN YOU DID NOT HAVE A STEADY PLACE TO SLEEP OR SLEPT IN A SHELTER (INCLUDING NOW)?: NO

## 2024-06-14 SDOH — ECONOMIC STABILITY: HOUSING INSECURITY: IN THE LAST 12 MONTHS, HOW MANY PLACES HAVE YOU LIVED?: 1

## 2024-06-14 SDOH — SOCIAL STABILITY: SOCIAL INSECURITY: ABUSE: ADULT

## 2024-06-14 SDOH — ECONOMIC STABILITY: INCOME INSECURITY: HOW HARD IS IT FOR YOU TO PAY FOR THE VERY BASICS LIKE FOOD, HOUSING, MEDICAL CARE, AND HEATING?: SOMEWHAT HARD

## 2024-06-14 SDOH — SOCIAL STABILITY: SOCIAL INSECURITY: ARE YOU OR HAVE YOU BEEN THREATENED OR ABUSED PHYSICALLY, EMOTIONALLY, OR SEXUALLY BY ANYONE?: NO

## 2024-06-14 SDOH — SOCIAL STABILITY: SOCIAL INSECURITY: WERE YOU ABLE TO COMPLETE ALL THE BEHAVIORAL HEALTH SCREENINGS?: YES

## 2024-06-14 SDOH — HEALTH STABILITY: MENTAL HEALTH: HOW MANY STANDARD DRINKS CONTAINING ALCOHOL DO YOU HAVE ON A TYPICAL DAY?: PATIENT DOES NOT DRINK

## 2024-06-14 SDOH — HEALTH STABILITY: MENTAL HEALTH: HOW OFTEN DO YOU HAVE A DRINK CONTAINING ALCOHOL?: NEVER

## 2024-06-14 SDOH — ECONOMIC STABILITY: INCOME INSECURITY: IN THE LAST 12 MONTHS, WAS THERE A TIME WHEN YOU WERE NOT ABLE TO PAY THE MORTGAGE OR RENT ON TIME?: NO

## 2024-06-14 SDOH — SOCIAL STABILITY: SOCIAL INSECURITY: DO YOU FEEL ANYONE HAS EXPLOITED OR TAKEN ADVANTAGE OF YOU FINANCIALLY OR OF YOUR PERSONAL PROPERTY?: NO

## 2024-06-14 SDOH — SOCIAL STABILITY: SOCIAL INSECURITY: ARE THERE ANY APPARENT SIGNS OF INJURIES/BEHAVIORS THAT COULD BE RELATED TO ABUSE/NEGLECT?: NO

## 2024-06-14 SDOH — SOCIAL STABILITY: SOCIAL INSECURITY: DO YOU FEEL UNSAFE GOING BACK TO THE PLACE WHERE YOU ARE LIVING?: NO

## 2024-06-14 SDOH — SOCIAL STABILITY: SOCIAL INSECURITY: HAS ANYONE EVER THREATENED TO HURT YOUR FAMILY OR YOUR PETS?: NO

## 2024-06-14 SDOH — HEALTH STABILITY: MENTAL HEALTH: HOW OFTEN DO YOU HAVE 6 OR MORE DRINKS ON ONE OCCASION?: NEVER

## 2024-06-14 SDOH — SOCIAL STABILITY: SOCIAL INSECURITY: HAVE YOU HAD ANY THOUGHTS OF HARMING ANYONE ELSE?: NO

## 2024-06-14 SDOH — SOCIAL STABILITY: SOCIAL INSECURITY: HAVE YOU HAD THOUGHTS OF HARMING ANYONE ELSE?: NO

## 2024-06-14 SDOH — ECONOMIC STABILITY: TRANSPORTATION INSECURITY
IN THE PAST 12 MONTHS, HAS LACK OF TRANSPORTATION KEPT YOU FROM MEETINGS, WORK, OR FROM GETTING THINGS NEEDED FOR DAILY LIVING?: NO

## 2024-06-14 SDOH — SOCIAL STABILITY: SOCIAL INSECURITY: DOES ANYONE TRY TO KEEP YOU FROM HAVING/CONTACTING OTHER FRIENDS OR DOING THINGS OUTSIDE YOUR HOME?: NO

## 2024-06-14 SDOH — ECONOMIC STABILITY: TRANSPORTATION INSECURITY
IN THE PAST 12 MONTHS, HAS THE LACK OF TRANSPORTATION KEPT YOU FROM MEDICAL APPOINTMENTS OR FROM GETTING MEDICATIONS?: NO

## 2024-06-14 ASSESSMENT — COGNITIVE AND FUNCTIONAL STATUS - GENERAL
DRESSING REGULAR UPPER BODY CLOTHING: A LITTLE
WALKING IN HOSPITAL ROOM: A LOT
CLIMB 3 TO 5 STEPS WITH RAILING: A LOT
STANDING UP FROM CHAIR USING ARMS: A LOT
DAILY ACTIVITIY SCORE: 21
PATIENT BASELINE BEDBOUND: NO
MOBILITY SCORE: 15
TURNING FROM BACK TO SIDE WHILE IN FLAT BAD: A LITTLE
MOVING TO AND FROM BED TO CHAIR: A LOT
TOILETING: A LITTLE
HELP NEEDED FOR BATHING: A LITTLE

## 2024-06-14 ASSESSMENT — ACTIVITIES OF DAILY LIVING (ADL)
DRESSING YOURSELF: INDEPENDENT
HEARING - LEFT EAR: HEARING AID
ADEQUATE_TO_COMPLETE_ADL: YES
BATHING: INDEPENDENT
PATIENT'S MEMORY ADEQUATE TO SAFELY COMPLETE DAILY ACTIVITIES?: YES
HEARING - RIGHT EAR: HEARING AID
JUDGMENT_ADEQUATE_SAFELY_COMPLETE_DAILY_ACTIVITIES: YES
WALKS IN HOME: INDEPENDENT
FEEDING YOURSELF: INDEPENDENT
TOILETING: INDEPENDENT
GROOMING: INDEPENDENT

## 2024-06-14 ASSESSMENT — PAIN SCALES - GENERAL
PAINLEVEL_OUTOF10: 0 - NO PAIN
PAINLEVEL_OUTOF10: 7
PAINLEVEL_OUTOF10: 0 - NO PAIN
PAINLEVEL_OUTOF10: 9
PAINLEVEL_OUTOF10: 0 - NO PAIN

## 2024-06-14 ASSESSMENT — LIFESTYLE VARIABLES
SUBSTANCE_ABUSE_PAST_12_MONTHS: NO
SKIP TO QUESTIONS 9-10: 1
AUDIT-C TOTAL SCORE: 0
HOW MANY STANDARD DRINKS CONTAINING ALCOHOL DO YOU HAVE ON A TYPICAL DAY: PATIENT DOES NOT DRINK
AUDIT-C TOTAL SCORE: 0
HOW OFTEN DO YOU HAVE A DRINK CONTAINING ALCOHOL: NEVER
HOW OFTEN DO YOU HAVE 6 OR MORE DRINKS ON ONE OCCASION: NEVER
AUDIT-C TOTAL SCORE: 0
SKIP TO QUESTIONS 9-10: 1
PRESCIPTION_ABUSE_PAST_12_MONTHS: NO

## 2024-06-14 ASSESSMENT — PAIN - FUNCTIONAL ASSESSMENT
PAIN_FUNCTIONAL_ASSESSMENT: 0-10

## 2024-06-14 ASSESSMENT — PAIN DESCRIPTION - LOCATION: LOCATION: BACK

## 2024-06-14 ASSESSMENT — PAIN DESCRIPTION - DESCRIPTORS
DESCRIPTORS: ACHING
DESCRIPTORS: ACHING

## 2024-06-14 ASSESSMENT — PATIENT HEALTH QUESTIONNAIRE - PHQ9
2. FEELING DOWN, DEPRESSED OR HOPELESS: SEVERAL DAYS
1. LITTLE INTEREST OR PLEASURE IN DOING THINGS: SEVERAL DAYS
SUM OF ALL RESPONSES TO PHQ9 QUESTIONS 1 & 2: 2

## 2024-06-14 ASSESSMENT — COLUMBIA-SUICIDE SEVERITY RATING SCALE - C-SSRS
2. HAVE YOU ACTUALLY HAD ANY THOUGHTS OF KILLING YOURSELF?: NO
6. HAVE YOU EVER DONE ANYTHING, STARTED TO DO ANYTHING, OR PREPARED TO DO ANYTHING TO END YOUR LIFE?: NO
1. IN THE PAST MONTH, HAVE YOU WISHED YOU WERE DEAD OR WISHED YOU COULD GO TO SLEEP AND NOT WAKE UP?: NO

## 2024-06-14 ASSESSMENT — PAIN DESCRIPTION - ORIENTATION: ORIENTATION: LOWER

## 2024-06-14 NOTE — ED PROVIDER NOTES
Chief Complaint   Patient presents with    abnormal labs     Pt was sent here from Dr Zuniga office for low potassium and sodium. Pt does feel light headed at times. Denies pain. 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   Social History     Social History Narrative    Not on file      Allergies   Allergen Reactions    Pregabalin Swelling        PMH: Reviewed  PSH: Reviewed  Social History: Reviewed.   Allergies reviewed.     HPI: Tr Arnold is a 58 y.o. male who presents to the ED today unaccompanied with complaints of abnormal labs.  Hospitals in [place] he was called and told that his labs that he had drawn this morning were abnormal and he needed to come to the ED.  Patient himself has no complaints.  States has had low sodium in the past, that is why he is seeing Dr. Tomlin.  Admitted to nursing staff he has been feeling lightheaded at times.  Denies having any chest pain or shortness of breath.  Denies feeling lightheaded at this time.      REVIEW OF SYSTEMS:  All other systems  reviewed and negative except as listed in HPI.    PHYSICAL EXAM:    GENERAL: Vitals noted, no distress. Alert and oriented x 3. Non-toxic.     EENT: TMs clear. Posterior oropharynx unremarkable. EOMI, no nystagmus noted.     NECK: Supple. No masses. No midline tenderness. No meningeal signs.     CARDIAC: Regular rate, rhythm. No murmurs rubs or gallops. No JVD.    PULMONARY: Lungs clear and equal bilaterally. No wheezes rales or rhonchi. No respiratory distress.     ABDOMEN: Obese. Soft, nondistended, and nontender. No peritoneal signs. Bowel sounds are present and normoactive in all 4 quadrants. No pulsatile masses.     EXTREMITIES: 2-3+ b/l LE peripheral edema.     SKIN: No rash. Warm, dry, and intact.     NEURO: No focal neurologic deficits.      Labs Reviewed   CBC WITH AUTO DIFFERENTIAL - Abnormal       Result Value    WBC 6.6      nRBC 0.0      RBC 2.90 (*)     Hemoglobin 9.4 (*)     Hematocrit 27.8 (*)     MCV 96      MCH 32.4      MCHC 33.8      RDW 14.9 (*)     Platelets 183      Neutrophils % 67.6      Immature Granulocytes %, Automated 0.8      Lymphocytes % 13.3      Monocytes % 15.7      Eosinophils % 2.0      Basophils % 0.6      Neutrophils Absolute 4.50      Immature Granulocytes Absolute, Automated 0.05      Lymphocytes Absolute 0.88 (*)     Monocytes Absolute 1.04 (*)     Eosinophils Absolute 0.13      Basophils Absolute 0.04     COMPREHENSIVE METABOLIC PANEL - Abnormal    Glucose 115 (*)     Sodium 127 (*)     Potassium 2.3 (*)     Chloride 90 (*)     Bicarbonate 28      Anion Gap 11      Urea Nitrogen 5 (*)     Creatinine 0.82      eGFR >90      Calcium 8.4 (*)     Albumin 3.0 (*)     Alkaline Phosphatase 160 (*)     Total Protein 6.1 (*)     AST 37      Bilirubin, Total 4.0 (*)     ALT 18     URINALYSIS WITH REFLEX CULTURE AND MICROSCOPIC - Abnormal    Color, Urine Yellow      Appearance, Urine Clear      Specific Gravity, Urine 1.034      pH, Urine 7.0      Protein, Urine 20 (TRACE)       Glucose, Urine Normal      Blood, Urine NEGATIVE      Ketones, Urine NEGATIVE      Bilirubin, Urine 0.5 (1+) (*)     Urobilinogen, Urine OVER (4+) (*)     Nitrite, Urine NEGATIVE      Leukocyte Esterase, Urine NEGATIVE     MAGNESIUM - Abnormal    Magnesium 1.34 (*)    URINALYSIS MICROSCOPIC WITH REFLEX CULTURE - Abnormal    WBC, Urine 11-20 (*)     WBC Clumps, Urine RARE      RBC, Urine 1-2      Bacteria, Urine 1+ (*)    SERIAL TROPONIN-INITIAL - Normal    Troponin I, High Sensitivity 5      Narrative:     Less than 99th percentile of normal range cutoff-  Female and children under 18 years old <14 ng/L; Male <21 ng/L: Negative  Repeat testing should be performed if clinically indicated.     Female and children under 18 years old 14-50 ng/L; Male 21-50 ng/L:  Consistent with possible cardiac damage and possible increased clinical   risk. Serial measurements may help to assess extent of myocardial damage.     >50 ng/L: Consistent with cardiac damage, increased clinical risk and  myocardial infarction. Serial measurements may help assess extent of   myocardial damage.      NOTE: Children less than 1 year old may have higher baseline troponin   levels and results should be interpreted in conjunction with the overall   clinical context.     NOTE: Troponin I testing is performed using a different   testing methodology at [facility] than at other   [facility]. Direct result comparisons should only   be made within the same method.   SERIAL TROPONIN, 1 HOUR - Normal    Troponin I, High Sensitivity 5      Narrative:     Less than 99th percentile of normal range cutoff-  Female and children under 18 years old <14 ng/L; Male <21 ng/L: Negative  Repeat testing should be performed if clinically indicated.     Female and children under 18 years old 14-50 ng/L; Male 21-50 ng/L:  Consistent with possible cardiac damage and possible increased clinical   risk. Serial measurements may help to assess extent of  myocardial damage.     >50 ng/L: Consistent with cardiac damage, increased clinical risk and  myocardial infarction. Serial measurements may help assess extent of   myocardial damage.      NOTE: Children less than 1 year old may have higher baseline troponin   levels and results should be interpreted in conjunction with the overall   clinical context.     NOTE: Troponin I testing is performed using a different   testing methodology at [facility] than at other   [facility]. Direct result comparisons should only   be made within the same method.   LIPASE - Normal    Lipase 31      Narrative:     Venipuncture immediately after or during the administration of Metamizole may lead to falsely low results. Testing should be performed immediately prior to Metamizole dosing.   URINE CULTURE   TROPONIN SERIES- (INITIAL, 1 HR)    Narrative:     The following orders were created for panel order Troponin I Series, High Sensitivity (0, 1 HR).  Procedure                               Abnormality         Status                     ---------                               -----------         ------                     Troponin I, High Sensiti...[[id number]]  Normal              Final result               Troponin, High Sensitivi...[[id number]]  Normal              Final result                 Please view results for these tests on the individual orders.   URINALYSIS WITH REFLEX CULTURE AND MICROSCOPIC    Narrative:     The following orders were created for panel order Urinalysis with Reflex Culture and Microscopic.  Procedure                               Abnormality         Status                     ---------                               -----------         ------                     Urinalysis with Reflex C...[[id number]]  Abnormal            Final result               Extra Urine Gray Tube[[id number]]                            In process                   Please view results for these tests on the individual  orders.   EXTRA URINE GRAY TUBE        US gallbladder   Final Result   Distended gallbladder with sludge and stones.  No gallbladder wall   thickening or biliary dilatation is appreciated.  Sonographic negative   Donato's sign.   Diffuse hepatic steatosis.   Poor visualization of the pancreas due to bowel gas.   Signed by Kalin Suh MD      CT abdomen pelvis w IV contrast   Final Result   1. Distended gallbladder of indeterminate cause. Cholelithiasis.   Recommend gallbladder ultrasound correlation.   2. Potential liver cirrhosis   3. No biliary dilation.   4. Prominent aortocaval lymph node of indeterminate cause with   smaller adjacent nodes and and mild reticular changes which could be   due to scar, or nonspecific prominence of the vascular lymphatics or   inflammation. Attention to this region recommended on clinical   assessment. Recommend short-term follow-up and/or PET-CT correlation.   5. 5 x 4 mm nodule right lung base likely incidental although   nonspecific.   6. Probably benign fat containing adrenal nodule favoring myelolipoma.   7. Postinflammatory chest calcifications.   8. Scattered atherosclerosis.        Incidental Finding:  A non-calcified pulmonary nodule/multiple   non-calcified pulmonary nodules measuring less than 6 mm, likely   benign.  (**-YCF-**)        Instructions:  No further follow-up is required, however, if the   patient has high risk factors for primary lung malignancy, follow-up   noncontrast CT scan chest in 12 months may be obtained. (Konstantin Gleasonhosterling et al., Guidelines for management of incidental pulmonary   nodules detected on CT images: From the Fleischner Society 2017,   Radiology. 2017 Jul;284 (1):228-243.) [email or web address]        Signed by: Eduin Hanson 6/14/2024 5:13 PM   Dictation workstation:   [id number]      XR chest 1 view   Final Result   Prominent cardiac silhouette. Possible infiltrate at the lateral   right lung base versus overlapping soft tissue  fold. Clinical   correlation and further evaluation/follow-up recommended.        MACRO:   None.        Signed by: Shaniqua Rojasjosh 6/14/2024 3:03 PM   Dictation workstation:   [id number]           Medical Decision Making  Amount and/or Complexity of Data Reviewed  Labs: ordered.  Radiology: ordered.  ECG/medicine tests: ordered.       EKG interpreted by myself shows SR with rate of 88. Left axis. WI interval 146. QRS interval 112, incomplete RBBB. QT interval 430. QTc interval 520. Non-specific ST-T wave changes. No acute ischemia or injury pattern.      ED COURSE: This patient was seen and examined by myself and Dr. Guaman. He is placed on a continuous cardiac monitor with pulse oximetry monitoring. Old records and EKGs are obtained and reviewed. IV heplock is established, labs are obtained and noted above. Potassium replaced with 40 meq orally and 20 meq IV. Magnesium replaced with 2gm IV. Bilirubin elevated. No abdominal pain. CT abd/pelv with contrast shows distended gallbladder of indeterminate cause.  Cholelithiasis.  Central liver cirrhosis.  No biliary dilatation.  Prominent lymph nodes.  5 x 4 mm nodule right lung base.  Probable benign fat and continued adrenal nodule favoring myolipoma.  Postinflammatory chest calcifications.  Scattered atherosclerosis.  Ultrasound of the gallbladder shows distended gallbladder with sludge and stones.  No gallbladder wall thickening or biliary dilatation appreciated.  Sonographic negative Donato sign.  Diffuse hepatic steatosis.  Poor visualization of the pancreas.  Patient was made aware of the findings of the imaging and his blood work today.  Agreeable with admission.  States he already knew about his pulmonary nodule.  Discussed with hospitalist, Dr. Bautista, accepted for med/tele admission.             Differential Diagnoses Considered: electrolyte abnormality, dehydration, infection viral vs bacterial, other    Chronic Medical Conditions Significantly Affecting  Care: see above    External Records Reviewed: I reviewed recent and relevant outside records including: PCP notes, prior discharge summary, previous radiologic studies    Diagnostic testing considered: blood, urine, CXR, CT    Escalation of Care: Appropriate for inpatient management    Discussion of Management with Other Providers: I discussed the patient/results with: admitting team        DIAGNOSTIC IMPRESSION: #1 hypokalemia #2 hyponatremia #3 anemia #4 elevated bilirubin     Janette Webber, MOY-MILEY  06/14/24 [id number]

## 2024-06-15 LAB
ALBUMIN SERPL BCP-MCNC: 2.6 G/DL (ref 3.4–5)
ALP SERPL-CCNC: 135 U/L (ref 33–136)
ALT SERPL W P-5'-P-CCNC: 13 U/L (ref 10–52)
ANION GAP SERPL CALC-SCNC: 9 MMOL/L (ref 10–20)
AST SERPL W P-5'-P-CCNC: 31 U/L (ref 9–39)
BILIRUB SERPL-MCNC: 3.4 MG/DL (ref 0–1.2)
BUN SERPL-MCNC: 5 MG/DL (ref 6–23)
CALCIUM SERPL-MCNC: 8 MG/DL (ref 8.6–10.3)
CHLORIDE SERPL-SCNC: 95 MMOL/L (ref 98–107)
CO2 SERPL-SCNC: 28 MMOL/L (ref 21–32)
CREAT SERPL-MCNC: 0.64 MG/DL (ref 0.5–1.3)
EGFRCR SERPLBLD CKD-EPI 2021: >90 ML/MIN/1.73M*2
ERYTHROCYTE [DISTWIDTH] IN BLOOD BY AUTOMATED COUNT: 14.9 % (ref 11.5–14.5)
FERRITIN SERPL-MCNC: 517 NG/ML (ref 20–300)
FOLATE SERPL-MCNC: 4.7 NG/ML
GLUCOSE SERPL-MCNC: 89 MG/DL (ref 74–99)
HCT VFR BLD AUTO: 25.4 % (ref 41–52)
HGB BLD-MCNC: 8.6 G/DL (ref 13.5–17.5)
HGB RETIC QN: 36 PG (ref 28–38)
HOLD SPECIMEN: NORMAL
IMMATURE RETIC FRACTION: 23.7 %
IRON SATN MFR SERPL: 37 % (ref 25–45)
IRON SERPL-MCNC: 46 UG/DL (ref 35–150)
MAGNESIUM SERPL-MCNC: 1.7 MG/DL (ref 1.6–2.4)
MCH RBC QN AUTO: 32.6 PG (ref 26–34)
MCHC RBC AUTO-ENTMCNC: 33.9 G/DL (ref 32–36)
MCV RBC AUTO: 96 FL (ref 80–100)
NRBC BLD-RTO: 0 /100 WBCS (ref 0–0)
OSMOLALITY SERPL: 264 MOSM/KG (ref 280–300)
PLATELET # BLD AUTO: 162 X10*3/UL (ref 150–450)
POTASSIUM SERPL-SCNC: 2.6 MMOL/L (ref 3.5–5.3)
PROT SERPL-MCNC: 5.1 G/DL (ref 6.4–8.2)
RBC # BLD AUTO: 2.64 X10*6/UL (ref 4.5–5.9)
RETICS #: 0.09 X10*6/UL (ref 0.02–0.12)
RETICS/RBC NFR AUTO: 3.4 % (ref 0.5–2)
SODIUM SERPL-SCNC: 129 MMOL/L (ref 136–145)
TIBC SERPL-MCNC: 123 UG/DL (ref 240–445)
UIBC SERPL-MCNC: 77 UG/DL (ref 110–370)
WBC # BLD AUTO: 4.2 X10*3/UL (ref 4.4–11.3)

## 2024-06-15 PROCEDURE — 36415 COLL VENOUS BLD VENIPUNCTURE: CPT | Performed by: INTERNAL MEDICINE

## 2024-06-15 PROCEDURE — 1200000002 HC GENERAL ROOM WITH TELEMETRY DAILY

## 2024-06-15 PROCEDURE — 82728 ASSAY OF FERRITIN: CPT | Performed by: INTERNAL MEDICINE

## 2024-06-15 PROCEDURE — 2500000001 HC RX 250 WO HCPCS SELF ADMINISTERED DRUGS (ALT 637 FOR MEDICARE OP): Performed by: INTERNAL MEDICINE

## 2024-06-15 PROCEDURE — 2500000002 HC RX 250 W HCPCS SELF ADMINISTERED DRUGS (ALT 637 FOR MEDICARE OP, ALT 636 FOR OP/ED): Performed by: INTERNAL MEDICINE

## 2024-06-15 PROCEDURE — 85045 AUTOMATED RETICULOCYTE COUNT: CPT | Performed by: INTERNAL MEDICINE

## 2024-06-15 PROCEDURE — 2500000004 HC RX 250 GENERAL PHARMACY W/ HCPCS (ALT 636 FOR OP/ED): Performed by: INTERNAL MEDICINE

## 2024-06-15 PROCEDURE — 80053 COMPREHEN METABOLIC PANEL: CPT | Performed by: INTERNAL MEDICINE

## 2024-06-15 PROCEDURE — 82746 ASSAY OF FOLIC ACID SERUM: CPT | Performed by: INTERNAL MEDICINE

## 2024-06-15 PROCEDURE — 83540 ASSAY OF IRON: CPT | Performed by: INTERNAL MEDICINE

## 2024-06-15 PROCEDURE — 2500000004 HC RX 250 GENERAL PHARMACY W/ HCPCS (ALT 636 FOR OP/ED): Performed by: STUDENT IN AN ORGANIZED HEALTH CARE EDUCATION/TRAINING PROGRAM

## 2024-06-15 PROCEDURE — 2500000001 HC RX 250 WO HCPCS SELF ADMINISTERED DRUGS (ALT 637 FOR MEDICARE OP): Performed by: STUDENT IN AN ORGANIZED HEALTH CARE EDUCATION/TRAINING PROGRAM

## 2024-06-15 PROCEDURE — 85027 COMPLETE CBC AUTOMATED: CPT | Performed by: INTERNAL MEDICINE

## 2024-06-15 PROCEDURE — 99232 SBSQ HOSP IP/OBS MODERATE 35: CPT | Performed by: STUDENT IN AN ORGANIZED HEALTH CARE EDUCATION/TRAINING PROGRAM

## 2024-06-15 PROCEDURE — 83735 ASSAY OF MAGNESIUM: CPT | Performed by: INTERNAL MEDICINE

## 2024-06-15 PROCEDURE — 83930 ASSAY OF BLOOD OSMOLALITY: CPT | Mod: SAMLAB | Performed by: STUDENT IN AN ORGANIZED HEALTH CARE EDUCATION/TRAINING PROGRAM

## 2024-06-15 PROCEDURE — 99222 1ST HOSP IP/OBS MODERATE 55: CPT | Performed by: SURGERY

## 2024-06-15 RX ORDER — ACETAMINOPHEN 160 MG/5ML
650 SOLUTION ORAL EVERY 4 HOURS PRN
Status: DISCONTINUED | OUTPATIENT
Start: 2024-06-15 | End: 2024-06-17 | Stop reason: HOSPADM

## 2024-06-15 RX ORDER — ONDANSETRON 4 MG/1
4 TABLET, ORALLY DISINTEGRATING ORAL EVERY 8 HOURS PRN
Status: DISCONTINUED | OUTPATIENT
Start: 2024-06-15 | End: 2024-06-17 | Stop reason: HOSPADM

## 2024-06-15 RX ORDER — MAGNESIUM SULFATE HEPTAHYDRATE 40 MG/ML
2 INJECTION, SOLUTION INTRAVENOUS ONCE
Status: COMPLETED | OUTPATIENT
Start: 2024-06-15 | End: 2024-06-15

## 2024-06-15 RX ORDER — POTASSIUM CHLORIDE 20 MEQ/1
40 TABLET, EXTENDED RELEASE ORAL 2 TIMES DAILY
Status: COMPLETED | OUTPATIENT
Start: 2024-06-15 | End: 2024-06-15

## 2024-06-15 RX ORDER — ENOXAPARIN SODIUM 100 MG/ML
40 INJECTION SUBCUTANEOUS EVERY 12 HOURS SCHEDULED
Status: DISCONTINUED | OUTPATIENT
Start: 2024-06-15 | End: 2024-06-17 | Stop reason: HOSPADM

## 2024-06-15 RX ORDER — POTASSIUM CHLORIDE 14.9 MG/ML
20 INJECTION INTRAVENOUS ONCE
Status: COMPLETED | OUTPATIENT
Start: 2024-06-15 | End: 2024-06-15

## 2024-06-15 RX ORDER — ONDANSETRON HYDROCHLORIDE 2 MG/ML
4 INJECTION, SOLUTION INTRAVENOUS EVERY 8 HOURS PRN
Status: DISCONTINUED | OUTPATIENT
Start: 2024-06-15 | End: 2024-06-17 | Stop reason: HOSPADM

## 2024-06-15 RX ORDER — FOLIC ACID 1 MG/1
0.5 TABLET ORAL DAILY
Status: DISCONTINUED | OUTPATIENT
Start: 2024-06-15 | End: 2024-06-17 | Stop reason: HOSPADM

## 2024-06-15 RX ORDER — ACETAMINOPHEN 325 MG/1
650 TABLET ORAL EVERY 4 HOURS PRN
Status: DISCONTINUED | OUTPATIENT
Start: 2024-06-15 | End: 2024-06-17 | Stop reason: HOSPADM

## 2024-06-15 RX ORDER — FERROUS SULFATE 325(65) MG
65 TABLET ORAL
Status: DISCONTINUED | OUTPATIENT
Start: 2024-06-16 | End: 2024-06-17 | Stop reason: HOSPADM

## 2024-06-15 RX ORDER — ACETAMINOPHEN 650 MG/1
650 SUPPOSITORY RECTAL EVERY 4 HOURS PRN
Status: DISCONTINUED | OUTPATIENT
Start: 2024-06-15 | End: 2024-06-17 | Stop reason: HOSPADM

## 2024-06-15 RX ORDER — MAGNESIUM HYDROXIDE 2400 MG/10ML
10 SUSPENSION ORAL DAILY PRN
Status: DISCONTINUED | OUTPATIENT
Start: 2024-06-15 | End: 2024-06-17 | Stop reason: HOSPADM

## 2024-06-15 RX ORDER — HYDROCODONE BITARTRATE AND ACETAMINOPHEN 5; 325 MG/1; MG/1
1 TABLET ORAL EVERY 8 HOURS PRN
Status: DISCONTINUED | OUTPATIENT
Start: 2024-06-15 | End: 2024-06-17 | Stop reason: HOSPADM

## 2024-06-15 RX ADMIN — MAGNESIUM SULFATE HEPTAHYDRATE 2 G: 40 INJECTION, SOLUTION INTRAVENOUS at 12:00

## 2024-06-15 RX ADMIN — FOLIC ACID 0.5 MG: 1 TABLET ORAL at 12:55

## 2024-06-15 RX ADMIN — POTASSIUM CHLORIDE 20 MEQ: 14.9 INJECTION, SOLUTION INTRAVENOUS at 14:54

## 2024-06-15 RX ADMIN — ENOXAPARIN SODIUM 40 MG: 40 INJECTION SUBCUTANEOUS at 08:24

## 2024-06-15 RX ADMIN — POTASSIUM CHLORIDE 40 MEQ: 1500 TABLET, EXTENDED RELEASE ORAL at 08:23

## 2024-06-15 RX ADMIN — ENOXAPARIN SODIUM 40 MG: 40 INJECTION SUBCUTANEOUS at 01:39

## 2024-06-15 RX ADMIN — HYDROCODONE BITARTRATE AND ACETAMINOPHEN 1 TABLET: 5; 325 TABLET ORAL at 11:00

## 2024-06-15 RX ADMIN — ENOXAPARIN SODIUM 40 MG: 40 INJECTION SUBCUTANEOUS at 20:02

## 2024-06-15 RX ADMIN — HYDROCODONE BITARTRATE AND ACETAMINOPHEN 1 TABLET: 5; 325 TABLET ORAL at 19:38

## 2024-06-15 RX ADMIN — POTASSIUM CHLORIDE 40 MEQ: 1500 TABLET, EXTENDED RELEASE ORAL at 20:02

## 2024-06-15 ASSESSMENT — COGNITIVE AND FUNCTIONAL STATUS - GENERAL
MOBILITY SCORE: 22
CLIMB 3 TO 5 STEPS WITH RAILING: A LITTLE
CLIMB 3 TO 5 STEPS WITH RAILING: A LITTLE
MOBILITY SCORE: 22
WALKING IN HOSPITAL ROOM: A LITTLE
WALKING IN HOSPITAL ROOM: A LITTLE
DAILY ACTIVITIY SCORE: 24
DAILY ACTIVITIY SCORE: 24

## 2024-06-15 ASSESSMENT — PAIN SCALES - GENERAL
PAINLEVEL_OUTOF10: 0 - NO PAIN
PAINLEVEL_OUTOF10: 9
PAINLEVEL_OUTOF10: 5 - MODERATE PAIN
PAINLEVEL_OUTOF10: 2
PAINLEVEL_OUTOF10: 9

## 2024-06-15 ASSESSMENT — PAIN - FUNCTIONAL ASSESSMENT
PAIN_FUNCTIONAL_ASSESSMENT: 0-10

## 2024-06-15 ASSESSMENT — PAIN DESCRIPTION - ORIENTATION: ORIENTATION: MID;LOWER

## 2024-06-15 ASSESSMENT — PAIN DESCRIPTION - LOCATION: LOCATION: BACK

## 2024-06-15 NOTE — CARE PLAN
Problem: Skin  Goal: Decreased wound size/increased tissue granulation at next dressing change  Outcome: Progressing  Goal: Participates in plan/prevention/treatment measures  Outcome: Progressing  Goal: Prevent/manage excess moisture  Outcome: Progressing  Goal: Prevent/minimize sheer/friction injuries  Outcome: Progressing  Goal: Promote/optimize nutrition  Outcome: Progressing  Goal: Promote skin healing  Outcome: Progressing     Problem: Fall/Injury  Goal: Not fall by end of shift  Outcome: Progressing  Goal: Be free from injury by end of the shift  Outcome: Progressing  Goal: Verbalize understanding of personal risk factors for fall in the hospital  Outcome: Progressing  Goal: Verbalize understanding of risk factor reduction measures to prevent injury from fall in the home  Outcome: Progressing  Goal: Use assistive devices by end of the shift  Outcome: Progressing  Goal: Pace activities to prevent fatigue by end of the shift  Outcome: Progressing     Problem: Nutrition  Goal: Less than 5 days NPO/clear liquids  Outcome: Progressing  Goal: Oral intake greater than 50%  Outcome: Progressing  Goal: Oral intake greater 75%  Outcome: Progressing  Goal: Consume prescribed supplement  Outcome: Progressing  Goal: Adequate PO fluid intake  Outcome: Progressing  Goal: Nutrition support goals are met within 48 hrs  Outcome: Progressing  Goal: Nutrition support is meeting 75% of nutrient needs  Outcome: Progressing  Goal: Tube feed tolerance  Outcome: Progressing  Goal: BG  mg/dL  Outcome: Progressing  Goal: Lab values WNL  Outcome: Progressing  Goal: Electrolytes WNL  Outcome: Progressing  Goal: Promote healing  Outcome: Progressing  Goal: Maintain stable weight  Outcome: Progressing  Goal: Reduce weight from edema/fluid  Outcome: Progressing  Goal: Gradual weight gain  Outcome: Progressing     Problem: Pain  Goal: Takes deep breaths with improved pain control throughout the shift  Outcome: Progressing  Goal:  Turns in bed with improved pain control throughout the shift  Outcome: Progressing  Goal: Walks with improved pain control throughout the shift  Outcome: Progressing  Goal: Performs ADL's with improved pain control throughout shift  Outcome: Progressing  Goal: Participates in PT with improved pain control throughout the shift  Outcome: Progressing  Goal: Free from opioid side effects throughout the shift  Outcome: Progressing  Goal: Free from acute confusion related to pain meds throughout the shift  Outcome: Progressing   The patient's goals for the shift include Labs WNL    The clinical goals for the shift include Labs WNL

## 2024-06-15 NOTE — PROGRESS NOTES
Tr Arnold is a 58 y.o. male on day 1 of admission presenting with Hypokalemia.      Subjective   Patient seen and examined at bedside.  Is doing well.  No acute issues today.       Objective     Last Recorded Vitals  /71   Pulse 78   Temp 36.8 °C (98.2 °F)   Resp 18   Wt 137 kg (301 lb 2.4 oz)   SpO2 96%   Intake/Output last 3 Shifts:    Intake/Output Summary (Last 24 hours) at 6/15/2024 1124  Last data filed at 6/15/2024 1119  Gross per 24 hour   Intake 1270 ml   Output 1200 ml   Net 70 ml       Admission Weight  Weight: 147 kg (324 lb) (06/14/24 1400)    Daily Weight  06/14/24 : 137 kg (301 lb 2.4 oz)    Image Results  US gallbladder  Narrative: STUDY:  Abdominal Ultrasound; 6/14/2024.  INDICATION:  Elevated bilirubin. Gallstones seen on CT.  COMPARISON:  CT A/P 6/14/2024.  ACCESSION NUMBER(S):  [id number]  ORDERING CLINICIAN:  NISHA WRIGHT BILDERBACK  TECHNIQUE:  Ultrasound of the Right Upper Quadrant.  Multiple images of the  abdomen were obtained.  FINDINGS:  LIVER:  The liver is diffusely echogenic with poor acoustic penetration.       GALLBLADDER:  The gallbladder is distended and contains sludge as well as multiple  stones.  There is no pericholecystic fluid or wall thickening.  Sonographic Donato's sign is negative.       BILE DUCTS:  The common bile duct measures 0.4 cm. There is no intrahepatic biliary  dilatation.       PANCREAS:  The pancreas is not seen due to overlying bowel gas.      RIGHT KIDNEY:  The right kidney measures 11.7 cm in length. Renal cortical  echotexture is normal. There is no hydronephrosis. There are no  stones. There are no cysts.  There is no evidence of free fluid within the abdomen.  Impression: Distended gallbladder with sludge and stones.  No gallbladder wall  thickening or biliary dilatation is appreciated.  Sonographic negative  Donato's sign.  Diffuse hepatic steatosis.  Poor visualization of the pancreas due to bowel gas.  Signed by Kalin Suh  MD  CT abdomen pelvis w IV contrast  Narrative: Interpreted By:  Eduin Hanson,   STUDY:  CT ABDOMEN PELVIS W IV CONTRAST;  6/14/2024 4:51 pm      INDICATION:  Signs/Symptoms:elevated bilirubin.      COMPARISON:  August 5, 2022 MRI lumbar spine, January 30, 2024 left hip  radiographs.      ACCESSION NUMBER(S):  [id number]      ORDERING CLINICIAN:  NISHA STALLWORTH      TECHNIQUE:  CT of the abdomen and pelvis was performed.  Standard contiguous  axial images were obtained at 3 mm slice thickness through the  abdomen and pelvis. Coronal and sagittal reconstructions at 3 mm  slice thickness were performed.      90 mL of Omnipaque 350 were administered intravenously without  immediate complication.      FINDINGS:  LOWER CHEST:  No acute cardiopulmonary abnormalities. Benign-appearing coarse  calcification at the right base likely postinflammatory series 3,  image 18. Smoothly marginated 5 x 4 mm right base series 3, image 8  likely incidental although nonspecific. Multiple postinflammatory  calcifications mediastinal hilar region. Scattered arterial vascular  calcifications including mildly extensive coronary artery  calcifications. There are mitral annular dystrophic calcifications.      ABDOMEN:      LIVER:  The liver is mildly lobulated potentially cirrhotic. There is  geographic liver hypoattenuation favoring geographic steatosis. No  evident mass.      BILE DUCTS:          GALLBLADDER:  Gallbladder is distended measuring 5.7 cm transverse diameter fundal  region series 2, image 60. There are tiny nonobstructing calculi  dependent portion. No evident wall thickening or surrounding fluid.      PANCREAS:  The pancreas appears unremarkable without evidence of ductal  dilatation or masses.      SPLEEN:  The spleen is enlarged measuring 16 cm length compared with 14 cm  August 5, 2022 MRI although limited lumbar spine MRI assessment.      ADRENAL GLANDS:  There is a probably benign exophytic nodule likely  arising from the  left adrenal gland measuring 3.3 cm transverse diameter containing  gross fat and internal reticular densities most compatible with a  myelolipoma series 2, image 85.      KIDNEYS AND URETERS:  The kidneys are normal in size and enhance symmetrically.  No  hydroureteronephrosis or nephroureterolithiasis is identified.  Probably benign 2 cm fluid density nodule right kidney most  compatible with a cyst.      PELVIS: Streak artifact from left hip arthroplasty partially limits  assessment of adjacent structures due to artifact including portion  of the urinary bladder, and reproductive organs.      BLADDER:  The visualized portion demonstrates no evident mass or calculus. The  bladder is minimally distended limiting the assessment.      REPRODUCTIVE ORGANS:  Grossly unremarkable although limited assessment.      BOWEL:  The stomach and bowel are normal caliber without evident acute  inflammatory change. Normal caliber appendix.      VESSELS:  Mild atherosclerosis. No evident aneurysm.      PERITONEUM/RETROPERITONEUM/LYMPH NODES:  No ascites. Prominent distal aortocaval node measures 12 x 11 mm  series 2, image 81 of indeterminate cause. Smaller adjacent nodes and  mild adjacent reticular changes.      BONES AND ABDOMINAL WALL:  Chronic compressive deformity at L4 appears similar. There is osseous  demineralization. Grossly intact left hip arthroplasty. Scattered  degenerative changes. No evident body wall mass.      Impression: 1. Distended gallbladder of indeterminate cause. Cholelithiasis.  Recommend gallbladder ultrasound correlation.  2. Potential liver cirrhosis  3. No biliary dilation.  4. Prominent aortocaval lymph node of indeterminate cause with  smaller adjacent nodes and and mild reticular changes which could be  due to scar, or nonspecific prominence of the vascular lymphatics or  inflammation. Attention to this region recommended on clinical  assessment. Recommend short-term follow-up  and/or PET-CT correlation.  5. 5 x 4 mm nodule right lung base likely incidental although  nonspecific.  6. Probably benign fat containing adrenal nodule favoring myelolipoma.  7. Postinflammatory chest calcifications.  8. Scattered atherosclerosis.      Incidental Finding:  A non-calcified pulmonary nodule/multiple  non-calcified pulmonary nodules measuring less than 6 mm, likely  benign.  (**-YCF-**)      Instructions:  No further follow-up is required, however, if the  patient has high risk factors for primary lung malignancy, follow-up  noncontrast CT scan chest in 12 months may be obtained. (Konstantin Gallego et al., Guidelines for management of incidental pulmonary  nodules detected on CT images: From the Fleischner Society 2017,  Radiology. 2017 Jul;284 (1):228-243.) [email or web address]      Signed by: Eduin Hanson 6/14/2024 5:13 PM  Dictation workstation:   [id number]  XR chest 1 view  Narrative: Interpreted By:  Shaniqua Hussein,   STUDY:  XR CHEST 1 VIEW;  6/14/2024 2:37 pm      INDICATION:  Signs/Symptoms:abn labs.      COMPARISON:  07/20/2021      ACCESSION NUMBER(S):  [id number]      ORDERING CLINICIAN:  NISHA STALLWORTH      FINDINGS:  Artifact from overlying monitoring leads noted.  Patient is slightly  rotated. Density at the lateral right lung base versus overlapping  soft tissue fold. Small dense possible calcified nodule or vascular  shadow in the left mid chest similar to the prior exam allowing for  differences in projection. Cardiac silhouette is borderline enlarged.      Impression: Prominent cardiac silhouette. Possible infiltrate at the lateral  right lung base versus overlapping soft tissue fold. Clinical  correlation and further evaluation/follow-up recommended.      MACRO:  None.      Signed by: Shaniqua Hussein 6/14/2024 3:03 PM  Dictation workstation:   [id number]      Physical Exam  Vitals reviewed.   Constitutional:       Appearance: Normal appearance. He is obese.   HENT:       Head: Normocephalic and atraumatic.      Nose: Nose normal.   Eyes:      Pupils: Pupils are equal, round, and reactive to light.   Cardiovascular:      Rate and Rhythm: Normal rate and regular rhythm.      Pulses: Normal pulses.      Heart sounds: Normal heart sounds.   Pulmonary:      Effort: Pulmonary effort is normal.   Abdominal:      General: Abdomen is flat.      Palpations: Abdomen is soft.   Musculoskeletal:      Cervical back: Normal range of motion.   Skin:     Comments: Cobbled appearance is.  Edema present  Bilateral lower extremity chronic venous changes     Neurological:      General: No focal deficit present.      Mental Status: He is alert.         Relevant Results             Scheduled medications  enoxaparin, 40 mg, subcutaneous, q12h YOMAIRA  potassium chloride CR, 40 mEq, oral, BID      Continuous medications     PRN medications  PRN medications: acetaminophen **OR** acetaminophen **OR** acetaminophen, HYDROcodone-acetaminophen, magnesium hydroxide, ondansetron ODT **OR** ondansetron    Results for orders placed or performed during the hospital encounter of 06/14/24 (from the past 24 hour(s))   CBC and Auto Differential   Result Value Ref Range    WBC 6.6 4.4 - 11.3 x10*3/uL    nRBC 0.0 0.0 - 0.0 /100 WBCs    RBC 2.90 (L) 4.50 - 5.90 x10*6/uL    Hemoglobin 9.4 (L) 13.5 - 17.5 g/dL    Hematocrit 27.8 (L) 41.0 - 52.0 %    MCV 96 80 - 100 fL    MCH 32.4 26.0 - 34.0 pg    MCHC 33.8 32.0 - 36.0 g/dL    RDW 14.9 (H) 11.5 - 14.5 %    Platelets 183 150 - 450 x10*3/uL    Neutrophils % 67.6 40.0 - 80.0 %    Immature Granulocytes %, Automated 0.8 0.0 - 0.9 %    Lymphocytes % 13.3 13.0 - 44.0 %    Monocytes % 15.7 2.0 - 10.0 %    Eosinophils % 2.0 0.0 - 6.0 %    Basophils % 0.6 0.0 - 2.0 %    Neutrophils Absolute 4.50 1.20 - 7.70 x10*3/uL    Immature Granulocytes Absolute, Automated 0.05 0.00 - 0.70 x10*3/uL    Lymphocytes Absolute 0.88 (L) 1.20 - 4.80 x10*3/uL    Monocytes Absolute 1.04 (H) 0.10 - 1.00 x10*3/uL     Eosinophils Absolute 0.13 0.00 - 0.70 x10*3/uL    Basophils Absolute 0.04 0.00 - 0.10 x10*3/uL   Comprehensive metabolic panel   Result Value Ref Range    Glucose 115 (H) 74 - 99 mg/dL    Sodium 127 (L) 136 - 145 mmol/L    Potassium 2.3 (LL) 3.5 - 5.3 mmol/L    Chloride 90 (L) 98 - 107 mmol/L    Bicarbonate 28 21 - 32 mmol/L    Anion Gap 11 10 - 20 mmol/L    Urea Nitrogen 5 (L) 6 - 23 mg/dL    Creatinine 0.82 0.50 - 1.30 mg/dL    eGFR >90 >60 mL/min/1.73m*2    Calcium 8.4 (L) 8.6 - 10.3 mg/dL    Albumin 3.0 (L) 3.4 - 5.0 g/dL    Alkaline Phosphatase 160 (H) 33 - 136 U/L    Total Protein 6.1 (L) 6.4 - 8.2 g/dL    AST 37 9 - 39 U/L    Bilirubin, Total 4.0 (H) 0.0 - 1.2 mg/dL    ALT 18 10 - 52 U/L   Troponin I, High Sensitivity, Initial   Result Value Ref Range    Troponin I, High Sensitivity 5 0 - 20 ng/L   Lipase   Result Value Ref Range    Lipase 31 9 - 82 U/L   Magnesium   Result Value Ref Range    Magnesium 1.34 (L) 1.60 - 2.40 mg/dL   Troponin, High Sensitivity, 1 Hour   Result Value Ref Range    Troponin I, High Sensitivity 5 0 - 20 ng/L   Urinalysis with Reflex Culture and Microscopic   Result Value Ref Range    Color, Urine Yellow Light-Yellow, Yellow, Dark-Yellow    Appearance, Urine Clear Clear    Specific Gravity, Urine 1.034 1.005 - 1.035    pH, Urine 7.0 5.0, 5.5, 6.0, 6.5, 7.0, 7.5, 8.0    Protein, Urine 20 (TRACE) NEGATIVE, 10 (TRACE), 20 (TRACE) mg/dL    Glucose, Urine Normal Normal mg/dL    Blood, Urine NEGATIVE NEGATIVE    Ketones, Urine NEGATIVE NEGATIVE mg/dL    Bilirubin, Urine 0.5 (1+) (A) NEGATIVE    Urobilinogen, Urine OVER (4+) (A) Normal mg/dL    Nitrite, Urine NEGATIVE NEGATIVE    Leukocyte Esterase, Urine NEGATIVE NEGATIVE   Extra Urine Gray Tube   Result Value Ref Range    Extra Tube Hold for add-ons.    Urinalysis Microscopic   Result Value Ref Range    WBC, Urine 11-20 (A) 1-5, NONE /HPF    WBC Clumps, Urine RARE Reference range not established. /HPF    RBC, Urine 1-2 NONE, 1-2, 3-5  /HPF    Bacteria, Urine 1+ (A) NONE SEEN /HPF   Folate   Result Value Ref Range    Folate, Serum 4.7 (L) >5.0 ng/mL   Ferritin   Result Value Ref Range    Ferritin 517 (H) 20 - 300 ng/mL   Iron and TIBC   Result Value Ref Range    Iron 46 35 - 150 ug/dL    UIBC 77 (L) 110 - 370 ug/dL    TIBC 123 (L) 240 - 445 ug/dL    % Saturation 37 25 - 45 %   Comprehensive metabolic panel   Result Value Ref Range    Glucose 89 74 - 99 mg/dL    Sodium 129 (L) 136 - 145 mmol/L    Potassium 2.6 (LL) 3.5 - 5.3 mmol/L    Chloride 95 (L) 98 - 107 mmol/L    Bicarbonate 28 21 - 32 mmol/L    Anion Gap 9 (L) 10 - 20 mmol/L    Urea Nitrogen 5 (L) 6 - 23 mg/dL    Creatinine 0.64 0.50 - 1.30 mg/dL    eGFR >90 >60 mL/min/1.73m*2    Calcium 8.0 (L) 8.6 - 10.3 mg/dL    Albumin 2.6 (L) 3.4 - 5.0 g/dL    Alkaline Phosphatase 135 33 - 136 U/L    Total Protein 5.1 (L) 6.4 - 8.2 g/dL    AST 31 9 - 39 U/L    Bilirubin, Total 3.4 (H) 0.0 - 1.2 mg/dL    ALT 13 10 - 52 U/L   CBC   Result Value Ref Range    WBC 4.2 (L) 4.4 - 11.3 x10*3/uL    nRBC 0.0 0.0 - 0.0 /100 WBCs    RBC 2.64 (L) 4.50 - 5.90 x10*6/uL    Hemoglobin 8.6 (L) 13.5 - 17.5 g/dL    Hematocrit 25.4 (L) 41.0 - 52.0 %    MCV 96 80 - 100 fL    MCH 32.6 26.0 - 34.0 pg    MCHC 33.9 32.0 - 36.0 g/dL    RDW 14.9 (H) 11.5 - 14.5 %    Platelets 162 150 - 450 x10*3/uL   Magnesium   Result Value Ref Range    Magnesium 1.70 1.60 - 2.40 mg/dL   Reticulocytes   Result Value Ref Range    Retic % 3.4 (H) 0.5 - 2.0 %    Retic Absolute 0.088 0.022 - 0.118 x10*6/uL    Reticulocyte Hemoglobin 36 28 - 38 pg    Immature Retic fraction 23.7 (H) <=16.0 %     US gallbladder    Result Date: 6/14/2024  STUDY: Abdominal Ultrasound; 6/14/2024. INDICATION: Elevated bilirubin. Gallstones seen on CT. COMPARISON: CT A/P 6/14/2024. ACCESSION NUMBER(S): [id number] ORDERING CLINICIAN: NISHA WRIGHT BILSHAHLA TECHNIQUE: Ultrasound of the Right Upper Quadrant.  Multiple images of the abdomen were obtained. FINDINGS: LIVER: The  liver is diffusely echogenic with poor acoustic penetration.   GALLBLADDER: The gallbladder is distended and contains sludge as well as multiple stones.  There is no pericholecystic fluid or wall thickening. Sonographic Donato's sign is negative.   BILE DUCTS: The common bile duct measures 0.4 cm. There is no intrahepatic biliary dilatation.   PANCREAS: The pancreas is not seen due to overlying bowel gas.  RIGHT KIDNEY: The right kidney measures 11.7 cm in length. Renal cortical echotexture is normal. There is no hydronephrosis. There are no stones. There are no cysts. There is no evidence of free fluid within the abdomen.    Distended gallbladder with sludge and stones.  No gallbladder wall thickening or biliary dilatation is appreciated.  Sonographic negative Donato's sign. Diffuse hepatic steatosis. Poor visualization of the pancreas due to bowel gas. Signed by Kalin Suh MD    CT abdomen pelvis w IV contrast    Result Date: 6/14/2024  Interpreted By:  Eduin Hanson, STUDY: CT ABDOMEN PELVIS W IV CONTRAST;  6/14/2024 4:51 pm   INDICATION: Signs/Symptoms:elevated bilirubin.   COMPARISON: August 5, 2022 MRI lumbar spine, January 30, 2024 left hip radiographs.   ACCESSION NUMBER(S): [id number]   ORDERING CLINICIAN: NISHA STALLWORTH   TECHNIQUE: CT of the abdomen and pelvis was performed.  Standard contiguous axial images were obtained at 3 mm slice thickness through the abdomen and pelvis. Coronal and sagittal reconstructions at 3 mm slice thickness were performed.   90 mL of Omnipaque 350 were administered intravenously without immediate complication.   FINDINGS: LOWER CHEST: No acute cardiopulmonary abnormalities. Benign-appearing coarse calcification at the right base likely postinflammatory series 3, image 18. Smoothly marginated 5 x 4 mm right base series 3, image 8 likely incidental although nonspecific. Multiple postinflammatory calcifications mediastinal hilar region. Scattered arterial vascular  calcifications including mildly extensive coronary artery calcifications. There are mitral annular dystrophic calcifications.   ABDOMEN:   LIVER: The liver is mildly lobulated potentially cirrhotic. There is geographic liver hypoattenuation favoring geographic steatosis. No evident mass.   BILE DUCTS:     GALLBLADDER: Gallbladder is distended measuring 5.7 cm transverse diameter fundal region series 2, image 60. There are tiny nonobstructing calculi dependent portion. No evident wall thickening or surrounding fluid.   PANCREAS: The pancreas appears unremarkable without evidence of ductal dilatation or masses.   SPLEEN: The spleen is enlarged measuring 16 cm length compared with 14 cm August 5, 2022 MRI although limited lumbar spine MRI assessment.   ADRENAL GLANDS: There is a probably benign exophytic nodule likely arising from the left adrenal gland measuring 3.3 cm transverse diameter containing gross fat and internal reticular densities most compatible with a myelolipoma series 2, image 85.   KIDNEYS AND URETERS: The kidneys are normal in size and enhance symmetrically.  No hydroureteronephrosis or nephroureterolithiasis is identified. Probably benign 2 cm fluid density nodule right kidney most compatible with a cyst.   PELVIS: Streak artifact from left hip arthroplasty partially limits assessment of adjacent structures due to artifact including portion of the urinary bladder, and reproductive organs.   BLADDER: The visualized portion demonstrates no evident mass or calculus. The bladder is minimally distended limiting the assessment.   REPRODUCTIVE ORGANS: Grossly unremarkable although limited assessment.   BOWEL: The stomach and bowel are normal caliber without evident acute inflammatory change. Normal caliber appendix.   VESSELS: Mild atherosclerosis. No evident aneurysm.   PERITONEUM/RETROPERITONEUM/LYMPH NODES: No ascites. Prominent distal aortocaval node measures 12 x 11 mm series 2, image 81 of  indeterminate cause. Smaller adjacent nodes and mild adjacent reticular changes.   BONES AND ABDOMINAL WALL: Chronic compressive deformity at L4 appears similar. There is osseous demineralization. Grossly intact left hip arthroplasty. Scattered degenerative changes. No evident body wall mass.       1. Distended gallbladder of indeterminate cause. Cholelithiasis. Recommend gallbladder ultrasound correlation. 2. Potential liver cirrhosis 3. No biliary dilation. 4. Prominent aortocaval lymph node of indeterminate cause with smaller adjacent nodes and and mild reticular changes which could be due to scar, or nonspecific prominence of the vascular lymphatics or inflammation. Attention to this region recommended on clinical assessment. Recommend short-term follow-up and/or PET-CT correlation. 5. 5 x 4 mm nodule right lung base likely incidental although nonspecific. 6. Probably benign fat containing adrenal nodule favoring myelolipoma. 7. Postinflammatory chest calcifications. 8. Scattered atherosclerosis.   Incidental Finding:  A non-calcified pulmonary nodule/multiple non-calcified pulmonary nodules measuring less than 6 mm, likely benign.  (**-YCF-**)   Instructions:  No further follow-up is required, however, if the patient has high risk factors for primary lung malignancy, follow-up noncontrast CT scan chest in 12 months may be obtained. (Konstantin Gallego et al., Guidelines for management of incidental pulmonary nodules detected on CT images: From the Fleischner Society 2017, Radiology. 2017 Jul;284 (1):228-243.) [email or web address]   Signed by: Eduin Hanson 6/14/2024 5:13 PM Dictation workstation:   [id number]    XR chest 1 view    Result Date: 6/14/2024  Interpreted By:  Shaniqua Hussein, STUDY: XR CHEST 1 VIEW;  6/14/2024 2:37 pm   INDICATION: Signs/Symptoms:abn labs.   COMPARISON: 07/20/2021   ACCESSION NUMBER(S): [id number]   ORDERING CLINICIAN: NISHA STALLWORTH   FINDINGS: Artifact from overlying  monitoring leads noted.  Patient is slightly rotated. Density at the lateral right lung base versus overlapping soft tissue fold. Small dense possible calcified nodule or vascular shadow in the left mid chest similar to the prior exam allowing for differences in projection. Cardiac silhouette is borderline enlarged.       Prominent cardiac silhouette. Possible infiltrate at the lateral right lung base versus overlapping soft tissue fold. Clinical correlation and further evaluation/follow-up recommended.   MACRO: None.   Signed by: Shaniqua Hussein 6/14/2024 3:03 PM Dictation workstation:   [id number]     Assessment/Plan    58-year-old morbidly obese male with a past medical history of hyponatremia, dysphagia, compression fracture of L4 vertebra, Chronic low back pain on Norco, left foot wound, left hip avascular necrosis status post hip replacement, depression, hypertension, chronic leg edema with bilateral chronic venous stasis changes on Lasix, gout, osteoporosis, right knee pain, left shoulder pain, alcohol abuse, alcoholic neuropathy, low testosterone levels, and anxiety who was sent from his nephrologist office to the emergency room after outpatient blood workup showed hypokalemia. In the ER, vital signs were grossly within normal limits. Blood workup showed hyponatremia, hypokalemia, hypomagnesemia, hypoalbuminemia, elevated alkaline phosphatase, elevated bilirubin and anemia.CT scan of the abdomen pelvis showed distended gallbladder with cholelithiasis and liver cirrhosis. Ultrasound of the gallbladder showed distended gallbladder with sludge and stones but no gallbladder wall thickening or biliary dilation     Hyponatremia  Hypokalemia  Hyperbilirubinemia  Thrombocytopenia  Hypertension  Gallstones    Plan  Continue current ongoing care  Continue to replace electrolytes  Will consult nephrology  Will obtain urine osmolalityAnd urine electrolyte studies  Surgery consult appreciated.  Patient will have MRCP on  Monday  Continue other home medications  Continue bowel regimen  Follow-up on fecal occult blood  Replete folate  Replete magnesium  Repeat iron    DVT prophylaxis: Lovenox  GI prophylaxis: Not required  Full code     Total time spent 35 minutes  Javon Vazquez MD

## 2024-06-15 NOTE — H&P
History Of Present Illness  Tr Arnold is a 58 y.o. male  Who was sent from the nephrologist office to the emergency room for hyponatremia and hypokalemia associated with lightheadedness.  On presentation, vital signs grossly within normal limits.  Pertinent findings on blood workup; hemoglobin 9.4, sodium 127, potassium 2.3, albumin 3, alkaline phosphatase 160, total bilirubin 4.0, magnesium 1.34.  Urinalysis came back positive for leukocytes and bacteria.  CT scan of the abdomen pelvis showed distended gallbladder with cholelithiasis and liver cirrhosis.  There is also Prominent aortocaval lymph node.  Incidental finding of 5 x 4 mm nodule right lung base.  Ultrasound of the gallbladder showed distended gallbladder with sludge and stones but no gallbladder wall thickening or biliary dilation.  Patient was given in the emergency room IV magnesium, potassium, and IV fluids and then admitted to the medical service for further investigation and management.  Upon encounter, patient resting comfortably in his bed.  He said that he is due to see his nephrologist next week and he was asked to do blood workup before seeing him.  When the blood workup results came back, he was called to come to the emergency room.  He was taking in the past lisinopril for his blood pressure but then he decided to stop it by himself as he was told that his blood pressure has been low.  He takes the Lasix not on a daily basis.  He denies having any urinary symptoms.  Denies having any blood in his stools.  Denies having any fatigue or weakness or bodyaches.  He said that he stopped drinking alcohol.  He only chews tobacco.  He has been having recent nausea but no abdominal pain.  No vomiting.  No diarrhea.    ROS  10 systems were reviewed and were negative except for those noted in the history of present illness.    Past Medical History  Past Medical History:   Diagnosis Date    Acute lumbosacral myofascial strain 02/09/2023    Acute  sinus infection 02/09/2023    Arthritis 02/09/2023    Chronic venous stasis dermatitis of both lower extremities 02/09/2023    Contact dermatitis 02/09/2023    Depression, major, in remission (CMS-[facility]) 02/09/2023    Gout     Hypertension     Idiopathic aseptic necrosis of left femur (Multi) 10/13/2021    Avascular necrosis of left femur    Left knee injury 02/09/2023    Leg edema 02/09/2023    Low back pain 02/09/2023    Lumbar radiculopathy, chronic 02/09/2023    Night sweat 02/09/2023    Numbness 02/09/2023    Right leg swelling 02/09/2023    Sacroiliitis (CMS-[facility]) 02/09/2023    Snoring 02/09/2023    Sodium deficiency 04/21/2023    Weight gain 02/09/2023     Pertinent medical history also documented in my below narrative    Surgical History  Past Surgical History:   Procedure Laterality Date    OTHER SURGICAL HISTORY  09/24/2019    Hernia repair    OTHER SURGICAL HISTORY  03/18/2021    Intra-articular injection    TOTAL HIP ARTHROPLASTY Left       Pertinent surgical history also documented in my below narrative    Social History  He reports that he has never smoked. He has been exposed to tobacco smoke. His smokeless tobacco use includes chew. He reports that he does not currently use alcohol after a past usage of about 12.0 standard drinks of alcohol per week. He reports that he does not use drugs.    Family History  No family history on file.     Allergies  Pregabalin    Medications Prior to Admission   Medication Sig Dispense Refill Last Dose    calcium carbonate 600 mg calcium (1,500 mg) tablet Take 1 tablet (600 mg) by mouth once daily.   6/13/2024    furosemide (Lasix) 40 mg tablet Take 1 tablet (40 mg) by mouth 3 times a day. (Patient taking differently: Take 1 tablet (40 mg) by mouth 3 times a day. Pt states that he will only take 1-2 tabs per day) 270 tablet 3 6/13/2024    HYDROcodone-acetaminophen (Norco)  mg tablet Take 1 tablet by mouth every 8 hours if needed for severe pain (7 - 10). 90 tablet  "0 6/14/2024 at am    magnesium oxide (Mag-Ox) 400 mg tablet Take 1 tablet (400 mg) by mouth once daily. 250mg dose?   6/13/2024    multivitamin tablet Take 1 tablet by mouth once daily.   6/14/2024 at am    mupirocin (Bactroban) 2 % ointment Apply topically. Apply to wound daily   Past Week    sodium chloride 1,000 mg tablet Take 3 tablets (3 g) by mouth 3 times a day. (Patient taking differently: Take 3 tablets (3 g) by mouth 3 times a day. Has only been taking about 4 tabs per day) 810 tablet 3 6/13/2024    testosterone cypionate (Depo-Testosterone) 200 mg/mL injection inject 2 milliliter intramuscularly every MONTH 2 mL 2 5/11/2024    vitamin D3-vitamin K2 (DosoKap) 137.5-200 mcg tablet Take 2 Units by mouth 2 times a day. Pt takes 1 tab 2x/day   6/13/2024    allopurinol (Zyloprim) 300 mg tablet take 1 tablet by mouth once daily 90 tablet 2 Unknown    DULoxetine (Cymbalta) 60 mg DR capsule Take 1 capsule (60 mg) by mouth once daily. 90 capsule 3 Unknown    lisinopril 40 mg tablet Take 1 tablet (40 mg) by mouth once daily. 30 tablet 11     moisturizing mouth (Biotene Dry Mouth Oral Rinse) solution Swish and spit 15 mL 3 times a day as needed (Dry mouth). 473 mL 5 Unknown    potassium chloride CR (Klor-Con M20) 20 mEq ER tablet Take 1 tablet (20 mEq) by mouth 3 times a day for 2 days. Do not crush or chew. 6 tablet 0         Last Recorded Vitals  Blood pressure 137/68, pulse 92, temperature 36.2 °C (97.2 °F), temperature source Temporal, resp. rate 18, height 1.803 m (5' 11\"), weight 137 kg (301 lb 2.4 oz), SpO2 94%.    Physical Exam  Constitutional:       General: He is not in acute distress.     Appearance: He is obese. He is not ill-appearing.      Comments: Awake alert and oriented x3   HENT:      Mouth/Throat:      Pharynx: Oropharynx is clear.   Eyes:      Pupils: Pupils are equal, round, and reactive to light.   Cardiovascular:      Rate and Rhythm: Normal rate and regular rhythm.      Heart sounds: Normal " heart sounds.   Pulmonary:      Effort: No respiratory distress.      Breath sounds: Normal breath sounds. No wheezing or rhonchi.   Abdominal:      General: Abdomen is flat. Bowel sounds are normal. There is no distension.      Palpations: Abdomen is soft.      Tenderness: There is no abdominal tenderness.   Musculoskeletal:      Comments: There is swelling in his bilateral legs that is moderate nonpitting.  This is associated with chronic venous stasis changes.  I did not appreciate any skin breaks or open wounds.   Skin:     General: Skin is warm.   Neurological:      General: No focal deficit present.   Psychiatric:         Mood and Affect: Mood normal.         Behavior: Behavior normal.         Thought Content: Thought content normal.         Judgment: Judgment normal.           Relevant Results  Results for orders placed or performed during the hospital encounter of 06/14/24 (from the past 24 hour(s))   CBC and Auto Differential   Result Value Ref Range    WBC 6.6 4.4 - 11.3 x10*3/uL    nRBC 0.0 0.0 - 0.0 /100 WBCs    RBC 2.90 (L) 4.50 - 5.90 x10*6/uL    Hemoglobin 9.4 (L) 13.5 - 17.5 g/dL    Hematocrit 27.8 (L) 41.0 - 52.0 %    MCV 96 80 - 100 fL    MCH 32.4 26.0 - 34.0 pg    MCHC 33.8 32.0 - 36.0 g/dL    RDW 14.9 (H) 11.5 - 14.5 %    Platelets 183 150 - 450 x10*3/uL    Neutrophils % 67.6 40.0 - 80.0 %    Immature Granulocytes %, Automated 0.8 0.0 - 0.9 %    Lymphocytes % 13.3 13.0 - 44.0 %    Monocytes % 15.7 2.0 - 10.0 %    Eosinophils % 2.0 0.0 - 6.0 %    Basophils % 0.6 0.0 - 2.0 %    Neutrophils Absolute 4.50 1.20 - 7.70 x10*3/uL    Immature Granulocytes Absolute, Automated 0.05 0.00 - 0.70 x10*3/uL    Lymphocytes Absolute 0.88 (L) 1.20 - 4.80 x10*3/uL    Monocytes Absolute 1.04 (H) 0.10 - 1.00 x10*3/uL    Eosinophils Absolute 0.13 0.00 - 0.70 x10*3/uL    Basophils Absolute 0.04 0.00 - 0.10 x10*3/uL   Comprehensive metabolic panel   Result Value Ref Range    Glucose 115 (H) 74 - 99 mg/dL    Sodium 127  (L) 136 - 145 mmol/L    Potassium 2.3 (LL) 3.5 - 5.3 mmol/L    Chloride 90 (L) 98 - 107 mmol/L    Bicarbonate 28 21 - 32 mmol/L    Anion Gap 11 10 - 20 mmol/L    Urea Nitrogen 5 (L) 6 - 23 mg/dL    Creatinine 0.82 0.50 - 1.30 mg/dL    eGFR >90 >60 mL/min/1.73m*2    Calcium 8.4 (L) 8.6 - 10.3 mg/dL    Albumin 3.0 (L) 3.4 - 5.0 g/dL    Alkaline Phosphatase 160 (H) 33 - 136 U/L    Total Protein 6.1 (L) 6.4 - 8.2 g/dL    AST 37 9 - 39 U/L    Bilirubin, Total 4.0 (H) 0.0 - 1.2 mg/dL    ALT 18 10 - 52 U/L   Troponin I, High Sensitivity, Initial   Result Value Ref Range    Troponin I, High Sensitivity 5 0 - 20 ng/L   Lipase   Result Value Ref Range    Lipase 31 9 - 82 U/L   Magnesium   Result Value Ref Range    Magnesium 1.34 (L) 1.60 - 2.40 mg/dL   Troponin, High Sensitivity, 1 Hour   Result Value Ref Range    Troponin I, High Sensitivity 5 0 - 20 ng/L   Urinalysis with Reflex Culture and Microscopic   Result Value Ref Range    Color, Urine Yellow Light-Yellow, Yellow, Dark-Yellow    Appearance, Urine Clear Clear    Specific Gravity, Urine 1.034 1.005 - 1.035    pH, Urine 7.0 5.0, 5.5, 6.0, 6.5, 7.0, 7.5, 8.0    Protein, Urine 20 (TRACE) NEGATIVE, 10 (TRACE), 20 (TRACE) mg/dL    Glucose, Urine Normal Normal mg/dL    Blood, Urine NEGATIVE NEGATIVE    Ketones, Urine NEGATIVE NEGATIVE mg/dL    Bilirubin, Urine 0.5 (1+) (A) NEGATIVE    Urobilinogen, Urine OVER (4+) (A) Normal mg/dL    Nitrite, Urine NEGATIVE NEGATIVE    Leukocyte Esterase, Urine NEGATIVE NEGATIVE   Urinalysis Microscopic   Result Value Ref Range    WBC, Urine 11-20 (A) 1-5, NONE /HPF    WBC Clumps, Urine RARE Reference range not established. /HPF    RBC, Urine 1-2 NONE, 1-2, 3-5 /HPF    Bacteria, Urine 1+ (A) NONE SEEN /HPF        US gallbladder    Result Date: 6/14/2024  STUDY: Abdominal Ultrasound; 6/14/2024. INDICATION: Elevated bilirubin. Gallstones seen on CT. COMPARISON: CT A/P 6/14/2024. ACCESSION NUMBER(S): [id number] ORDERING CLINICIAN: NISHA WRIGHT  BILDERBACK TECHNIQUE: Ultrasound of the Right Upper Quadrant.  Multiple images of the abdomen were obtained. FINDINGS: LIVER: The liver is diffusely echogenic with poor acoustic penetration.   GALLBLADDER: The gallbladder is distended and contains sludge as well as multiple stones.  There is no pericholecystic fluid or wall thickening. Sonographic Donato's sign is negative.   BILE DUCTS: The common bile duct measures 0.4 cm. There is no intrahepatic biliary dilatation.   PANCREAS: The pancreas is not seen due to overlying bowel gas.  RIGHT KIDNEY: The right kidney measures 11.7 cm in length. Renal cortical echotexture is normal. There is no hydronephrosis. There are no stones. There are no cysts. There is no evidence of free fluid within the abdomen.    Distended gallbladder with sludge and stones.  No gallbladder wall thickening or biliary dilatation is appreciated.  Sonographic negative Donato's sign. Diffuse hepatic steatosis. Poor visualization of the pancreas due to bowel gas. Signed by Kalin Shu MD    CT abdomen pelvis w IV contrast    Result Date: 6/14/2024  Interpreted By:  Eduin Hanson, STUDY: CT ABDOMEN PELVIS W IV CONTRAST;  6/14/2024 4:51 pm   INDICATION: Signs/Symptoms:elevated bilirubin.   COMPARISON: August 5, 2022 MRI lumbar spine, January 30, 2024 left hip radiographs.   ACCESSION NUMBER(S): [id number]   ORDERING CLINICIAN: NISHA STALLWORTH   TECHNIQUE: CT of the abdomen and pelvis was performed.  Standard contiguous axial images were obtained at 3 mm slice thickness through the abdomen and pelvis. Coronal and sagittal reconstructions at 3 mm slice thickness were performed.   90 mL of Omnipaque 350 were administered intravenously without immediate complication.   FINDINGS: LOWER CHEST: No acute cardiopulmonary abnormalities. Benign-appearing coarse calcification at the right base likely postinflammatory series 3, image 18. Smoothly marginated 5 x 4 mm right base series 3, image 8  likely incidental although nonspecific. Multiple postinflammatory calcifications mediastinal hilar region. Scattered arterial vascular calcifications including mildly extensive coronary artery calcifications. There are mitral annular dystrophic calcifications.   ABDOMEN:   LIVER: The liver is mildly lobulated potentially cirrhotic. There is geographic liver hypoattenuation favoring geographic steatosis. No evident mass.   BILE DUCTS:     GALLBLADDER: Gallbladder is distended measuring 5.7 cm transverse diameter fundal region series 2, image 60. There are tiny nonobstructing calculi dependent portion. No evident wall thickening or surrounding fluid.   PANCREAS: The pancreas appears unremarkable without evidence of ductal dilatation or masses.   SPLEEN: The spleen is enlarged measuring 16 cm length compared with 14 cm August 5, 2022 MRI although limited lumbar spine MRI assessment.   ADRENAL GLANDS: There is a probably benign exophytic nodule likely arising from the left adrenal gland measuring 3.3 cm transverse diameter containing gross fat and internal reticular densities most compatible with a myelolipoma series 2, image 85.   KIDNEYS AND URETERS: The kidneys are normal in size and enhance symmetrically.  No hydroureteronephrosis or nephroureterolithiasis is identified. Probably benign 2 cm fluid density nodule right kidney most compatible with a cyst.   PELVIS: Streak artifact from left hip arthroplasty partially limits assessment of adjacent structures due to artifact including portion of the urinary bladder, and reproductive organs.   BLADDER: The visualized portion demonstrates no evident mass or calculus. The bladder is minimally distended limiting the assessment.   REPRODUCTIVE ORGANS: Grossly unremarkable although limited assessment.   BOWEL: The stomach and bowel are normal caliber without evident acute inflammatory change. Normal caliber appendix.   VESSELS: Mild atherosclerosis. No evident aneurysm.    PERITONEUM/RETROPERITONEUM/LYMPH NODES: No ascites. Prominent distal aortocaval node measures 12 x 11 mm series 2, image 81 of indeterminate cause. Smaller adjacent nodes and mild adjacent reticular changes.   BONES AND ABDOMINAL WALL: Chronic compressive deformity at L4 appears similar. There is osseous demineralization. Grossly intact left hip arthroplasty. Scattered degenerative changes. No evident body wall mass.       1. Distended gallbladder of indeterminate cause. Cholelithiasis. Recommend gallbladder ultrasound correlation. 2. Potential liver cirrhosis 3. No biliary dilation. 4. Prominent aortocaval lymph node of indeterminate cause with smaller adjacent nodes and and mild reticular changes which could be due to scar, or nonspecific prominence of the vascular lymphatics or inflammation. Attention to this region recommended on clinical assessment. Recommend short-term follow-up and/or PET-CT correlation. 5. 5 x 4 mm nodule right lung base likely incidental although nonspecific. 6. Probably benign fat containing adrenal nodule favoring myelolipoma. 7. Postinflammatory chest calcifications. 8. Scattered atherosclerosis.   Incidental Finding:  A non-calcified pulmonary nodule/multiple non-calcified pulmonary nodules measuring less than 6 mm, likely benign.  (**-YCF-**)   Instructions:  No further follow-up is required, however, if the patient has high risk factors for primary lung malignancy, follow-up noncontrast CT scan chest in 12 months may be obtained. (Konstantin Gallego et al., Guidelines for management of incidental pulmonary nodules detected on CT images: From the Fleischner Society 2017, Radiology. 2017 Jul;284 (1):228-243.) [email or web address]   Signed by: Eduin Hanson 6/14/2024 5:13 PM Dictation workstation:   [id number]    XR chest 1 view    Result Date: 6/14/2024  Interpreted By:  Shaniqua Hussein, STUDY: XR CHEST 1 VIEW;  6/14/2024 2:37 pm   INDICATION: Signs/Symptoms:abn labs.   COMPARISON:  07/20/2021   ACCESSION NUMBER(S): [id number]   ORDERING CLINICIAN: NISHA STALLWORTH   FINDINGS: Artifact from overlying monitoring leads noted.  Patient is slightly rotated. Density at the lateral right lung base versus overlapping soft tissue fold. Small dense possible calcified nodule or vascular shadow in the left mid chest similar to the prior exam allowing for differences in projection. Cardiac silhouette is borderline enlarged.       Prominent cardiac silhouette. Possible infiltrate at the lateral right lung base versus overlapping soft tissue fold. Clinical correlation and further evaluation/follow-up recommended.   MACRO: None.   Signed by: Shaniqua Hussein 6/14/2024 3:03 PM Dictation workstation:   [id number]       Assessment/Plan     58-year-old morbidly obese male with a past medical history of hyponatremia, dysphagia, compression fracture of L4 vertebra,  Chronic low back pain on Norco, left foot wound, left hip avascular necrosis status post hip replacement, depression, hypertension, chronic leg edema with bilateral chronic venous stasis changes on Lasix, gout, osteoporosis, right knee pain, left shoulder pain, alcohol abuse, alcoholic neuropathy, low testosterone levels, and anxiety who was sent from his nephrologist office to the emergency room after outpatient blood workup showed hypokalemia.  In the ER, vital signs were grossly within normal limits.  Blood workup showed hyponatremia, hypokalemia, hypomagnesemia, hypoalbuminemia, elevated alkaline phosphatase, elevated bilirubin and anemia.CT scan of the abdomen pelvis showed distended gallbladder with cholelithiasis and liver cirrhosis. Ultrasound of the gallbladder showed distended gallbladder with sludge and stones but no gallbladder wall thickening or biliary dilation.    Hypokalemia-most likely secondary to the Lasix that the patient has been taking.  Potassium repleted.  Check levels in the morning.  Consult nephrology.  And hold on Lasix  for now.    Hyponatremia-this is chronic.  Patient was seen by nephrology in the past.  And it was attributed to his history of alcoholism.  For now, I would hold on giving any further IV fluids.  And I am consulted nephrology.  Repeat sodium levels in the morning.    Anemia-hemoglobin 9.4.  It was 11.1 around 5 months ago.  Etiology still unclear.  I will go ahead and order anemia panel with reticulocyte count and FOBT.    Elevated bilirubin-gallbladder ultrasound showed a distended gallbladder with sludge.  Currently patient not having pain.  I will consult general surgery.  And monitor bilirubin levels while at the hospital.    Hypertension-as mentioned in my above history, patient I decided to discontinue the lisinopril by himself stating that his blood pressure has been low.  So I will go ahead and monitor blood pressure while he is here at the hospital.    Lovenox and SCDs for DVT prophylaxis  Full code      (This note was generated with voice recognition software and may contain errors including spelling, grammar, syntax and misrecognition of what was dictated, that are not fully corrected)     Michele Dubon MD

## 2024-06-15 NOTE — CONSULTS
Reason For Consult  Elevated bilirubin    History Of Present Illness  Tr Arnold is a 58 y.o. male presenting with electrolyte derangements.  On his admitting labs he was found to have an elevated bilirubin.  He underwent ultrasound which does show gallstones but no evidence of acute cholecystitis and no dilated common bile duct.  The intrahepatic ducts are normal also.  The patient is currently pain-free.  He states he was pain-free last night also.     Past Medical History  He has a past medical history of Acute lumbosacral myofascial strain (02/09/2023), Acute sinus infection (02/09/2023), Arthritis (02/09/2023), Chronic venous stasis dermatitis of both lower extremities (02/09/2023), Contact dermatitis (02/09/2023), Depression, major, in remission (CMS-HCC) (02/09/2023), Gout, Hypertension, Idiopathic aseptic necrosis of left femur (Multi) (10/13/2021), Left knee injury (02/09/2023), Leg edema (02/09/2023), Low back pain (02/09/2023), Lumbar radiculopathy, chronic (02/09/2023), Night sweat (02/09/2023), Numbness (02/09/2023), Right leg swelling (02/09/2023), Sacroiliitis (CMS-HCC) (02/09/2023), Snoring (02/09/2023), Sodium deficiency (04/21/2023), and Weight gain (02/09/2023).    Surgical History  He has a past surgical history that includes Other surgical history (09/24/2019); Other surgical history (03/18/2021); and Total hip arthroplasty (Left).     Social History  He reports that he has never smoked. He has been exposed to tobacco smoke. His smokeless tobacco use includes chew. He reports that he does not currently use alcohol after a past usage of about 12.0 standard drinks of alcohol per week. He reports that he does not use drugs.    Allergies  Pregabalin    Review of Systems  Patient denies any neurologic changes.  He denies any chest pain or shortness of breath.  He denies any abdominal pain.  He states he has difficulty ambulating due to his neurologic issues.  He uses a walker.     Physical  "Exam  Awake alert lying in bed no acute distress.  Pupils are equal sclera anicteric  Respirations unlabored  Regular rate  Abdomen is obese soft and nontender.  There is no Donato sign.  Neurologic is grossly intact.     Last Recorded Vitals  Blood pressure 143/71, pulse 78, temperature 36.8 °C (98.2 °F), resp. rate 18, height 1.803 m (5' 11\"), weight 137 kg (301 lb 2.4 oz), SpO2 96%.    Relevant Results   Latest Reference Range & Units 06/15/24 04:35   GLUCOSE 74 - 99 mg/dL 89   SODIUM 136 - 145 mmol/L 129 (L)   POTASSIUM 3.5 - 5.3 mmol/L 2.6 (LL)   CHLORIDE 98 - 107 mmol/L 95 (L)   Bicarbonate 21 - 32 mmol/L 28   Anion Gap 10 - 20 mmol/L 9 (L)   Blood Urea Nitrogen 6 - 23 mg/dL 5 (L)   Creatinine 0.50 - 1.30 mg/dL 0.64   EGFR >60 mL/min/1.73m*2 >90   Calcium 8.6 - 10.3 mg/dL 8.0 (L)   Albumin 3.4 - 5.0 g/dL 2.6 (L)   Alkaline Phosphatase 33 - 136 U/L 135   ALT 10 - 52 U/L 13   AST 9 - 39 U/L 31   Bilirubin Total 0.0 - 1.2 mg/dL 3.4 (H)   FERRITIN 20 - 300 ng/mL 517 (H)   FOLATE >5.0 ng/mL 4.7 (L)   Total Protein 6.4 - 8.2 g/dL 5.1 (L)   IRON 35 - 150 ug/dL 46   MAGNESIUM 1.60 - 2.40 mg/dL 1.70   TIBC 240 - 445 ug/dL 123 (L)   UIBC 110 - 370 ug/dL 77 (L)   % Saturation 25 - 45 % 37   LEUKOCYTES (10*3/UL) IN BLOOD BY AUTOMATED COUNT, [place] 4.4 - 11.3 x10*3/uL 4.2 (L)   nRBC 0.0 - 0.0 /100 WBCs 0.0   ERYTHROCYTES (10*6/UL) IN BLOOD BY AUTOMATED COUNT, [place] 4.50 - 5.90 x10*6/uL 2.64 (L)   HEMOGLOBIN 13.5 - 17.5 g/dL 8.6 (L)   HEMATOCRIT 41.0 - 52.0 % 25.4 (L)   MCV 80 - 100 fL 96   MCH 26.0 - 34.0 pg 32.6   MCHC 32.0 - 36.0 g/dL 33.9   RED CELL DISTRIBUTION WIDTH 11.5 - 14.5 % 14.9 (H)   PLATELETS (10*3/UL) IN BLOOD AUTOMATED COUNT, [place] 150 - 450 x10*3/uL 162   Immature Retic fraction <=16.0 % 23.7 (H)   Retic Absolute 0.022 - 0.118 x10*6/uL 0.088   Retic % 0.5 - 2.0 % 3.4 (H)   Reticulocyte Hemoglobin 28 - 38 pg 36   (LL): Data is critically low  (L): Data is abnormally low  (H): Data is abnormally " high     Assessment/Plan   Elevated bilirubin with gallstones.  Given the fact that is coming down so quickly he probably passed a common bile duct stone even though he was asymptomatic.  I would continue to correct his electrolytes.  I would also monitor his bilirubin on a daily basis.  I have ordered an MRCP for Monday.  This was all discussed with the patient.  He understands and has no questions.  Alaina Grover MD

## 2024-06-15 NOTE — PROGRESS NOTES
06/15/24 1214   Discharge Planning   Living Arrangements Spouse/significant other   Support Systems Spouse/significant other   Assistance Needed independent at home   Type of Residence Private residence   Number of Stairs to Enter Residence 2   Number of Stairs Within Residence 0   Do you have animals or pets at home? No   Who is requesting discharge planning? Provider   Home or Post Acute Services None   Patient expects to be discharged to: return home     SW met with patient, reviewing with him the role of SW in the discharge process. Confirmed his address and insurance. He does live with his girlfriend in an apartment and he is independent with his care.Does have a walker,cane, and shower chair if needed. Does have Dr ortiz as his PCP. He is able to afford his meds with his insurance and he mentioned that he will no longer be able to use the [facility] in [place] as it is closing. Explained that he would need to call the store here in [place] and ask to transfer his prescriptions to there. He voiced understanding. He also denies any needs at discharge. Plan is for patient to return home when ready. SW/CT to follow for any changes.

## 2024-06-16 VITALS
OXYGEN SATURATION: 97 % | RESPIRATION RATE: 18 BRPM | TEMPERATURE: 98.1 F | HEART RATE: 91 BPM | BODY MASS INDEX: 42.16 KG/M2 | HEIGHT: 71 IN | DIASTOLIC BLOOD PRESSURE: 71 MMHG | SYSTOLIC BLOOD PRESSURE: 146 MMHG | WEIGHT: 301.15 LBS

## 2024-06-16 LAB
ALBUMIN SERPL BCP-MCNC: 2.6 G/DL (ref 3.4–5)
ALP SERPL-CCNC: 133 U/L (ref 33–136)
ALT SERPL W P-5'-P-CCNC: 13 U/L (ref 10–52)
ANION GAP SERPL CALC-SCNC: 9 MMOL/L (ref 10–20)
AST SERPL W P-5'-P-CCNC: 31 U/L (ref 9–39)
BACTERIA UR CULT: NORMAL
BILIRUB SERPL-MCNC: 2.6 MG/DL (ref 0–1.2)
BUN SERPL-MCNC: 4 MG/DL (ref 6–23)
CALCIUM SERPL-MCNC: 8 MG/DL (ref 8.6–10.3)
CHLORIDE SERPL-SCNC: 97 MMOL/L (ref 98–107)
CO2 SERPL-SCNC: 27 MMOL/L (ref 21–32)
CREAT SERPL-MCNC: 0.57 MG/DL (ref 0.5–1.3)
EGFRCR SERPLBLD CKD-EPI 2021: >90 ML/MIN/1.73M*2
ERYTHROCYTE [DISTWIDTH] IN BLOOD BY AUTOMATED COUNT: 15 % (ref 11.5–14.5)
GLUCOSE SERPL-MCNC: 89 MG/DL (ref 74–99)
HCT VFR BLD AUTO: 27 % (ref 41–52)
HGB BLD-MCNC: 8.9 G/DL (ref 13.5–17.5)
MAGNESIUM SERPL-MCNC: 1.81 MG/DL (ref 1.6–2.4)
MCH RBC QN AUTO: 32.1 PG (ref 26–34)
MCHC RBC AUTO-ENTMCNC: 33 G/DL (ref 32–36)
MCV RBC AUTO: 98 FL (ref 80–100)
NRBC BLD-RTO: 0 /100 WBCS (ref 0–0)
PLATELET # BLD AUTO: 166 X10*3/UL (ref 150–450)
POTASSIUM SERPL-SCNC: 3.2 MMOL/L (ref 3.5–5.3)
PROT SERPL-MCNC: 5.1 G/DL (ref 6.4–8.2)
RBC # BLD AUTO: 2.77 X10*6/UL (ref 4.5–5.9)
SODIUM SERPL-SCNC: 130 MMOL/L (ref 136–145)
WBC # BLD AUTO: 3.3 X10*3/UL (ref 4.4–11.3)

## 2024-06-16 PROCEDURE — 2500000001 HC RX 250 WO HCPCS SELF ADMINISTERED DRUGS (ALT 637 FOR MEDICARE OP): Performed by: INTERNAL MEDICINE

## 2024-06-16 PROCEDURE — 1200000002 HC GENERAL ROOM WITH TELEMETRY DAILY

## 2024-06-16 PROCEDURE — 2500000004 HC RX 250 GENERAL PHARMACY W/ HCPCS (ALT 636 FOR OP/ED): Performed by: STUDENT IN AN ORGANIZED HEALTH CARE EDUCATION/TRAINING PROGRAM

## 2024-06-16 PROCEDURE — 85027 COMPLETE CBC AUTOMATED: CPT | Performed by: STUDENT IN AN ORGANIZED HEALTH CARE EDUCATION/TRAINING PROGRAM

## 2024-06-16 PROCEDURE — 2500000004 HC RX 250 GENERAL PHARMACY W/ HCPCS (ALT 636 FOR OP/ED): Performed by: INTERNAL MEDICINE

## 2024-06-16 PROCEDURE — 99232 SBSQ HOSP IP/OBS MODERATE 35: CPT | Performed by: SURGERY

## 2024-06-16 PROCEDURE — 84075 ASSAY ALKALINE PHOSPHATASE: CPT | Performed by: STUDENT IN AN ORGANIZED HEALTH CARE EDUCATION/TRAINING PROGRAM

## 2024-06-16 PROCEDURE — 2500000001 HC RX 250 WO HCPCS SELF ADMINISTERED DRUGS (ALT 637 FOR MEDICARE OP): Performed by: STUDENT IN AN ORGANIZED HEALTH CARE EDUCATION/TRAINING PROGRAM

## 2024-06-16 PROCEDURE — 83735 ASSAY OF MAGNESIUM: CPT | Performed by: STUDENT IN AN ORGANIZED HEALTH CARE EDUCATION/TRAINING PROGRAM

## 2024-06-16 PROCEDURE — 99232 SBSQ HOSP IP/OBS MODERATE 35: CPT | Performed by: STUDENT IN AN ORGANIZED HEALTH CARE EDUCATION/TRAINING PROGRAM

## 2024-06-16 PROCEDURE — 36415 COLL VENOUS BLD VENIPUNCTURE: CPT | Performed by: STUDENT IN AN ORGANIZED HEALTH CARE EDUCATION/TRAINING PROGRAM

## 2024-06-16 RX ORDER — POTASSIUM CHLORIDE 14.9 MG/ML
20 INJECTION INTRAVENOUS ONCE
Status: COMPLETED | OUTPATIENT
Start: 2024-06-16 | End: 2024-06-16

## 2024-06-16 RX ORDER — MAGNESIUM SULFATE HEPTAHYDRATE 40 MG/ML
2 INJECTION, SOLUTION INTRAVENOUS ONCE
Status: COMPLETED | OUTPATIENT
Start: 2024-06-16 | End: 2024-06-16

## 2024-06-16 RX ADMIN — MAGNESIUM SULFATE HEPTAHYDRATE 2 G: 40 INJECTION, SOLUTION INTRAVENOUS at 09:30

## 2024-06-16 RX ADMIN — FOLIC ACID 0.5 MG: 1 TABLET ORAL at 10:11

## 2024-06-16 RX ADMIN — ENOXAPARIN SODIUM 40 MG: 40 INJECTION SUBCUTANEOUS at 20:09

## 2024-06-16 RX ADMIN — POTASSIUM CHLORIDE 20 MEQ: 14.9 INJECTION, SOLUTION INTRAVENOUS at 10:16

## 2024-06-16 RX ADMIN — MAGNESIUM HYDROXIDE 10 ML: 2400 SUSPENSION ORAL at 14:29

## 2024-06-16 RX ADMIN — HYDROCODONE BITARTRATE AND ACETAMINOPHEN 1 TABLET: 5; 325 TABLET ORAL at 17:30

## 2024-06-16 RX ADMIN — FERROUS SULFATE TAB 325 MG (65 MG ELEMENTAL FE) 1 TABLET: 325 (65 FE) TAB at 07:41

## 2024-06-16 RX ADMIN — HYDROCODONE BITARTRATE AND ACETAMINOPHEN 1 TABLET: 5; 325 TABLET ORAL at 05:43

## 2024-06-16 RX ADMIN — ENOXAPARIN SODIUM 40 MG: 40 INJECTION SUBCUTANEOUS at 10:11

## 2024-06-16 ASSESSMENT — COGNITIVE AND FUNCTIONAL STATUS - GENERAL
MOBILITY SCORE: 24
DAILY ACTIVITIY SCORE: 24

## 2024-06-16 ASSESSMENT — PAIN SCALES - GENERAL
PAINLEVEL_OUTOF10: 9
PAINLEVEL_OUTOF10: 0 - NO PAIN
PAINLEVEL_OUTOF10: 0 - NO PAIN
PAINLEVEL_OUTOF10: 9

## 2024-06-16 ASSESSMENT — PAIN - FUNCTIONAL ASSESSMENT
PAIN_FUNCTIONAL_ASSESSMENT: 0-10
PAIN_FUNCTIONAL_ASSESSMENT: 0-10

## 2024-06-16 NOTE — PROGRESS NOTES
rT Arnold is a 58 y.o. male on day 2 of admission presenting with Hypokalemia.      Subjective   Patient seen and examined at bedside.  Is doing well.  No acute issues today.  He says he was constipated.  Recommended MiraLAX       Objective     Last Recorded Vitals  /67 (BP Location: Left arm, Patient Position: Lying)   Pulse 77   Temp 36.5 °C (97.7 °F)   Resp 18   Wt 137 kg (301 lb 2.4 oz)   SpO2 97%   Intake/Output last 3 Shifts:    Intake/Output Summary (Last 24 hours) at 6/16/2024 1206  Last data filed at 6/16/2024 0914  Gross per 24 hour   Intake 840 ml   Output 2375 ml   Net -1535 ml       Admission Weight  Weight: 147 kg (324 lb) (06/14/24 1400)    Daily Weight  06/14/24 : 137 kg (301 lb 2.4 oz)    Image Results  US gallbladder  Narrative: STUDY:  Abdominal Ultrasound; 6/14/2024.  INDICATION:  Elevated bilirubin. Gallstones seen on CT.  COMPARISON:  CT A/P 6/14/2024.  ACCESSION NUMBER(S):  [id number]  ORDERING CLINICIAN:  NISHA WRIGHT BILGENETBACK  TECHNIQUE:  Ultrasound of the Right Upper Quadrant.  Multiple images of the  abdomen were obtained.  FINDINGS:  LIVER:  The liver is diffusely echogenic with poor acoustic penetration.       GALLBLADDER:  The gallbladder is distended and contains sludge as well as multiple  stones.  There is no pericholecystic fluid or wall thickening.  Sonographic Donato's sign is negative.       BILE DUCTS:  The common bile duct measures 0.4 cm. There is no intrahepatic biliary  dilatation.       PANCREAS:  The pancreas is not seen due to overlying bowel gas.      RIGHT KIDNEY:  The right kidney measures 11.7 cm in length. Renal cortical  echotexture is normal. There is no hydronephrosis. There are no  stones. There are no cysts.  There is no evidence of free fluid within the abdomen.  Impression: Distended gallbladder with sludge and stones.  No gallbladder wall  thickening or biliary dilatation is appreciated.  Sonographic negative  Donato's sign.  Diffuse  hepatic steatosis.  Poor visualization of the pancreas due to bowel gas.  Signed by Kalin Suh MD  CT abdomen pelvis w IV contrast  Narrative: Interpreted By:  Eduin Hanson,   STUDY:  CT ABDOMEN PELVIS W IV CONTRAST;  6/14/2024 4:51 pm      INDICATION:  Signs/Symptoms:elevated bilirubin.      COMPARISON:  August 5, 2022 MRI lumbar spine, January 30, 2024 left hip  radiographs.      ACCESSION NUMBER(S):  [id number]      ORDERING CLINICIAN:  NISHA STALLWORTH      TECHNIQUE:  CT of the abdomen and pelvis was performed.  Standard contiguous  axial images were obtained at 3 mm slice thickness through the  abdomen and pelvis. Coronal and sagittal reconstructions at 3 mm  slice thickness were performed.      90 mL of Omnipaque 350 were administered intravenously without  immediate complication.      FINDINGS:  LOWER CHEST:  No acute cardiopulmonary abnormalities. Benign-appearing coarse  calcification at the right base likely postinflammatory series 3,  image 18. Smoothly marginated 5 x 4 mm right base series 3, image 8  likely incidental although nonspecific. Multiple postinflammatory  calcifications mediastinal hilar region. Scattered arterial vascular  calcifications including mildly extensive coronary artery  calcifications. There are mitral annular dystrophic calcifications.      ABDOMEN:      LIVER:  The liver is mildly lobulated potentially cirrhotic. There is  geographic liver hypoattenuation favoring geographic steatosis. No  evident mass.      BILE DUCTS:          GALLBLADDER:  Gallbladder is distended measuring 5.7 cm transverse diameter fundal  region series 2, image 60. There are tiny nonobstructing calculi  dependent portion. No evident wall thickening or surrounding fluid.      PANCREAS:  The pancreas appears unremarkable without evidence of ductal  dilatation or masses.      SPLEEN:  The spleen is enlarged measuring 16 cm length compared with 14 cm  August 5, 2022 MRI although limited lumbar  spine MRI assessment.      ADRENAL GLANDS:  There is a probably benign exophytic nodule likely arising from the  left adrenal gland measuring 3.3 cm transverse diameter containing  gross fat and internal reticular densities most compatible with a  myelolipoma series 2, image 85.      KIDNEYS AND URETERS:  The kidneys are normal in size and enhance symmetrically.  No  hydroureteronephrosis or nephroureterolithiasis is identified.  Probably benign 2 cm fluid density nodule right kidney most  compatible with a cyst.      PELVIS: Streak artifact from left hip arthroplasty partially limits  assessment of adjacent structures due to artifact including portion  of the urinary bladder, and reproductive organs.      BLADDER:  The visualized portion demonstrates no evident mass or calculus. The  bladder is minimally distended limiting the assessment.      REPRODUCTIVE ORGANS:  Grossly unremarkable although limited assessment.      BOWEL:  The stomach and bowel are normal caliber without evident acute  inflammatory change. Normal caliber appendix.      VESSELS:  Mild atherosclerosis. No evident aneurysm.      PERITONEUM/RETROPERITONEUM/LYMPH NODES:  No ascites. Prominent distal aortocaval node measures 12 x 11 mm  series 2, image 81 of indeterminate cause. Smaller adjacent nodes and  mild adjacent reticular changes.      BONES AND ABDOMINAL WALL:  Chronic compressive deformity at L4 appears similar. There is osseous  demineralization. Grossly intact left hip arthroplasty. Scattered  degenerative changes. No evident body wall mass.      Impression: 1. Distended gallbladder of indeterminate cause. Cholelithiasis.  Recommend gallbladder ultrasound correlation.  2. Potential liver cirrhosis  3. No biliary dilation.  4. Prominent aortocaval lymph node of indeterminate cause with  smaller adjacent nodes and and mild reticular changes which could be  due to scar, or nonspecific prominence of the vascular lymphatics or  inflammation.  Attention to this region recommended on clinical  assessment. Recommend short-term follow-up and/or PET-CT correlation.  5. 5 x 4 mm nodule right lung base likely incidental although  nonspecific.  6. Probably benign fat containing adrenal nodule favoring myelolipoma.  7. Postinflammatory chest calcifications.  8. Scattered atherosclerosis.      Incidental Finding:  A non-calcified pulmonary nodule/multiple  non-calcified pulmonary nodules measuring less than 6 mm, likely  benign.  (**-YCF-**)      Instructions:  No further follow-up is required, however, if the  patient has high risk factors for primary lung malignancy, follow-up  noncontrast CT scan chest in 12 months may be obtained. (Konstantin Gleasonhosterling et al., Guidelines for management of incidental pulmonary  nodules detected on CT images: From the Fleischner Society 2017,  Radiology. 2017 Jul;284 (1):228-243.) [email or web address]      Signed by: Eduin Hanson 6/14/2024 5:13 PM  Dictation workstation:   [id number]  XR chest 1 view  Narrative: Interpreted By:  Shaniqua Hussein,   STUDY:  XR CHEST 1 VIEW;  6/14/2024 2:37 pm      INDICATION:  Signs/Symptoms:abn labs.      COMPARISON:  07/20/2021      ACCESSION NUMBER(S):  [id number]      ORDERING CLINICIAN:  NISHA STALLWORTH      FINDINGS:  Artifact from overlying monitoring leads noted.  Patient is slightly  rotated. Density at the lateral right lung base versus overlapping  soft tissue fold. Small dense possible calcified nodule or vascular  shadow in the left mid chest similar to the prior exam allowing for  differences in projection. Cardiac silhouette is borderline enlarged.      Impression: Prominent cardiac silhouette. Possible infiltrate at the lateral  right lung base versus overlapping soft tissue fold. Clinical  correlation and further evaluation/follow-up recommended.      MACRO:  None.      Signed by: Shaniqua Hussein 6/14/2024 3:03 PM  Dictation workstation:   [id number]      Physical Exam  Vitals  reviewed.   Constitutional:       Appearance: Normal appearance. He is obese.   HENT:      Head: Normocephalic and atraumatic.      Nose: Nose normal.   Eyes:      Pupils: Pupils are equal, round, and reactive to light.   Cardiovascular:      Rate and Rhythm: Normal rate and regular rhythm.      Pulses: Normal pulses.      Heart sounds: Normal heart sounds.   Pulmonary:      Effort: Pulmonary effort is normal.   Abdominal:      General: Abdomen is flat.      Palpations: Abdomen is soft.   Musculoskeletal:      Cervical back: Normal range of motion.   Skin:     Comments: Cobbled appearance is.  Edema present  Bilateral lower extremity chronic venous changes     Neurological:      General: No focal deficit present.      Mental Status: He is alert.         Relevant Results             Scheduled medications  enoxaparin, 40 mg, subcutaneous, q12h YOMAIRA  ferrous sulfate (325 mg ferrous sulfate), 65 mg of iron, oral, Daily with breakfast  folic acid, 0.5 mg, oral, Daily  magnesium sulfate, 2 g, intravenous, Once  potassium chloride, 20 mEq, intravenous, Once      Continuous medications     PRN medications  PRN medications: acetaminophen **OR** acetaminophen **OR** acetaminophen, HYDROcodone-acetaminophen, magnesium hydroxide, ondansetron ODT **OR** ondansetron    Results for orders placed or performed during the hospital encounter of 06/14/24 (from the past 24 hour(s))   CBC   Result Value Ref Range    WBC 3.3 (L) 4.4 - 11.3 x10*3/uL    nRBC 0.0 0.0 - 0.0 /100 WBCs    RBC 2.77 (L) 4.50 - 5.90 x10*6/uL    Hemoglobin 8.9 (L) 13.5 - 17.5 g/dL    Hematocrit 27.0 (L) 41.0 - 52.0 %    MCV 98 80 - 100 fL    MCH 32.1 26.0 - 34.0 pg    MCHC 33.0 32.0 - 36.0 g/dL    RDW 15.0 (H) 11.5 - 14.5 %    Platelets 166 150 - 450 x10*3/uL   Comprehensive Metabolic Panel   Result Value Ref Range    Glucose 89 74 - 99 mg/dL    Sodium 130 (L) 136 - 145 mmol/L    Potassium 3.2 (L) 3.5 - 5.3 mmol/L    Chloride 97 (L) 98 - 107 mmol/L    Bicarbonate 27  21 - 32 mmol/L    Anion Gap 9 (L) 10 - 20 mmol/L    Urea Nitrogen 4 (L) 6 - 23 mg/dL    Creatinine 0.57 0.50 - 1.30 mg/dL    eGFR >90 >60 mL/min/1.73m*2    Calcium 8.0 (L) 8.6 - 10.3 mg/dL    Albumin 2.6 (L) 3.4 - 5.0 g/dL    Alkaline Phosphatase 133 33 - 136 U/L    Total Protein 5.1 (L) 6.4 - 8.2 g/dL    AST 31 9 - 39 U/L    Bilirubin, Total 2.6 (H) 0.0 - 1.2 mg/dL    ALT 13 10 - 52 U/L   Magnesium   Result Value Ref Range    Magnesium 1.81 1.60 - 2.40 mg/dL     US gallbladder    Result Date: 6/14/2024  STUDY: Abdominal Ultrasound; 6/14/2024. INDICATION: Elevated bilirubin. Gallstones seen on CT. COMPARISON: CT A/P 6/14/2024. ACCESSION NUMBER(S): [id number] ORDERING CLINICIAN: NISHA STALLWORTH TECHNIQUE: Ultrasound of the Right Upper Quadrant.  Multiple images of the abdomen were obtained. FINDINGS: LIVER: The liver is diffusely echogenic with poor acoustic penetration.   GALLBLADDER: The gallbladder is distended and contains sludge as well as multiple stones.  There is no pericholecystic fluid or wall thickening. Sonographic Donato's sign is negative.   BILE DUCTS: The common bile duct measures 0.4 cm. There is no intrahepatic biliary dilatation.   PANCREAS: The pancreas is not seen due to overlying bowel gas.  RIGHT KIDNEY: The right kidney measures 11.7 cm in length. Renal cortical echotexture is normal. There is no hydronephrosis. There are no stones. There are no cysts. There is no evidence of free fluid within the abdomen.    Distended gallbladder with sludge and stones.  No gallbladder wall thickening or biliary dilatation is appreciated.  Sonographic negative Donato's sign. Diffuse hepatic steatosis. Poor visualization of the pancreas due to bowel gas. Signed by Kalin Suh MD    CT abdomen pelvis w IV contrast    Result Date: 6/14/2024  Interpreted By:  Eduin Hanson, STUDY: CT ABDOMEN PELVIS W IV CONTRAST;  6/14/2024 4:51 pm   INDICATION: Signs/Symptoms:elevated bilirubin.   COMPARISON:  August 5, 2022 MRI lumbar spine, January 30, 2024 left hip radiographs.   ACCESSION NUMBER(S): [id number]   ORDERING CLINICIAN: NISHA STALLWORTH   TECHNIQUE: CT of the abdomen and pelvis was performed.  Standard contiguous axial images were obtained at 3 mm slice thickness through the abdomen and pelvis. Coronal and sagittal reconstructions at 3 mm slice thickness were performed.   90 mL of Omnipaque 350 were administered intravenously without immediate complication.   FINDINGS: LOWER CHEST: No acute cardiopulmonary abnormalities. Benign-appearing coarse calcification at the right base likely postinflammatory series 3, image 18. Smoothly marginated 5 x 4 mm right base series 3, image 8 likely incidental although nonspecific. Multiple postinflammatory calcifications mediastinal hilar region. Scattered arterial vascular calcifications including mildly extensive coronary artery calcifications. There are mitral annular dystrophic calcifications.   ABDOMEN:   LIVER: The liver is mildly lobulated potentially cirrhotic. There is geographic liver hypoattenuation favoring geographic steatosis. No evident mass.   BILE DUCTS:     GALLBLADDER: Gallbladder is distended measuring 5.7 cm transverse diameter fundal region series 2, image 60. There are tiny nonobstructing calculi dependent portion. No evident wall thickening or surrounding fluid.   PANCREAS: The pancreas appears unremarkable without evidence of ductal dilatation or masses.   SPLEEN: The spleen is enlarged measuring 16 cm length compared with 14 cm August 5, 2022 MRI although limited lumbar spine MRI assessment.   ADRENAL GLANDS: There is a probably benign exophytic nodule likely arising from the left adrenal gland measuring 3.3 cm transverse diameter containing gross fat and internal reticular densities most compatible with a myelolipoma series 2, image 85.   KIDNEYS AND URETERS: The kidneys are normal in size and enhance symmetrically.  No  hydroureteronephrosis or nephroureterolithiasis is identified. Probably benign 2 cm fluid density nodule right kidney most compatible with a cyst.   PELVIS: Streak artifact from left hip arthroplasty partially limits assessment of adjacent structures due to artifact including portion of the urinary bladder, and reproductive organs.   BLADDER: The visualized portion demonstrates no evident mass or calculus. The bladder is minimally distended limiting the assessment.   REPRODUCTIVE ORGANS: Grossly unremarkable although limited assessment.   BOWEL: The stomach and bowel are normal caliber without evident acute inflammatory change. Normal caliber appendix.   VESSELS: Mild atherosclerosis. No evident aneurysm.   PERITONEUM/RETROPERITONEUM/LYMPH NODES: No ascites. Prominent distal aortocaval node measures 12 x 11 mm series 2, image 81 of indeterminate cause. Smaller adjacent nodes and mild adjacent reticular changes.   BONES AND ABDOMINAL WALL: Chronic compressive deformity at L4 appears similar. There is osseous demineralization. Grossly intact left hip arthroplasty. Scattered degenerative changes. No evident body wall mass.       1. Distended gallbladder of indeterminate cause. Cholelithiasis. Recommend gallbladder ultrasound correlation. 2. Potential liver cirrhosis 3. No biliary dilation. 4. Prominent aortocaval lymph node of indeterminate cause with smaller adjacent nodes and and mild reticular changes which could be due to scar, or nonspecific prominence of the vascular lymphatics or inflammation. Attention to this region recommended on clinical assessment. Recommend short-term follow-up and/or PET-CT correlation. 5. 5 x 4 mm nodule right lung base likely incidental although nonspecific. 6. Probably benign fat containing adrenal nodule favoring myelolipoma. 7. Postinflammatory chest calcifications. 8. Scattered atherosclerosis.   Incidental Finding:  A non-calcified pulmonary nodule/multiple non-calcified pulmonary  nodules measuring less than 6 mm, likely benign.  (**-YCF-**)   Instructions:  No further follow-up is required, however, if the patient has high risk factors for primary lung malignancy, follow-up noncontrast CT scan chest in 12 months may be obtained. (Konstantin Garibay al., Guidelines for management of incidental pulmonary nodules detected on CT images: From the Fleischner Society 2017, Radiology. 2017 Jul;284 (1):228-243.) [email or web address]   Signed by: Eduin Hanson 6/14/2024 5:13 PM Dictation workstation:   [id number]    XR chest 1 view    Result Date: 6/14/2024  Interpreted By:  Shaniqua Hussein, STUDY: XR CHEST 1 VIEW;  6/14/2024 2:37 pm   INDICATION: Signs/Symptoms:abn labs.   COMPARISON: 07/20/2021   ACCESSION NUMBER(S): [id number]   ORDERING CLINICIAN: NISHA STALLWORTH   FINDINGS: Artifact from overlying monitoring leads noted.  Patient is slightly rotated. Density at the lateral right lung base versus overlapping soft tissue fold. Small dense possible calcified nodule or vascular shadow in the left mid chest similar to the prior exam allowing for differences in projection. Cardiac silhouette is borderline enlarged.       Prominent cardiac silhouette. Possible infiltrate at the lateral right lung base versus overlapping soft tissue fold. Clinical correlation and further evaluation/follow-up recommended.   MACRO: None.   Signed by: Shaniqua Hussein 6/14/2024 3:03 PM Dictation workstation:   [id number]     Assessment/Plan    58-year-old morbidly obese male with a past medical history of hyponatremia, dysphagia, compression fracture of L4 vertebra, Chronic low back pain on Norco, left foot wound, left hip avascular necrosis status post hip replacement, depression, hypertension, chronic leg edema with bilateral chronic venous stasis changes on Lasix, gout, osteoporosis, right knee pain, left shoulder pain, alcohol abuse, alcoholic neuropathy, low testosterone levels, and anxiety who was sent from his  nephrologist office to the emergency room after outpatient blood workup showed hypokalemia. In the ER, vital signs were grossly within normal limits. Blood workup showed hyponatremia, hypokalemia, hypomagnesemia, hypoalbuminemia, elevated alkaline phosphatase, elevated bilirubin and anemia.CT scan of the abdomen pelvis showed distended gallbladder with cholelithiasis and liver cirrhosis. Ultrasound of the gallbladder showed distended gallbladder with sludge and stones but no gallbladder wall thickening or biliary dilation     Hyponatremia  Hypokalemia  Hyperbilirubinemia  Thrombocytopenia  Hypertension  Gallstones    Plan  Continue current ongoing care  Continue to replace electrolytes.  There is 1 more dose of IV potassium along with 40 twice daily and IV magnesium today  Urology consult pending  Will obtain urine osmolalityAnd urine electrolyte studies, still pending  Surgery consult appreciated.  Patient will have MRCP on Monday.  Hyperbilirubinemia improving  Continue other home medications  Continue bowel regimen  Follow-up on fecal occult blood  Replete folate  Replete magnesium  Repeat iron    DVT prophylaxis: Lovenox  GI prophylaxis: Not required  Full code     Total time spent 35 minutes  Javon Vazquez MD

## 2024-06-16 NOTE — PROGRESS NOTES
"Tr Arnold is a 58 y.o. male on day 2 of admission presenting with Hypokalemia.  I am seeing him for an elevated bilirubin.  This is slowly coming down.  He is denying any abdominal pain today and is enjoying his meal.    Subjective   Patient denies any abdominal pain.  He was anxious to go home but understands he is staying for the test and his bilirubin is still elevated.  He states he has not had a bowel movement since Friday and is requesting something.  He is also wondering if he could have something to relax him for the MRI tomorrow.    Objective   Awake alert no acute distress.  He is eating his lunch.  Sclera anicteric  Unlabored respirations  Regular rate  Abdomen is obese soft nontender  Extremities moving all extremities    Last Recorded Vitals  Blood pressure 136/67, pulse 77, temperature 36.5 °C (97.7 °F), resp. rate 18, height 1.803 m (5' 11\"), weight 137 kg (301 lb 2.4 oz), SpO2 97%.  Intake/Output last 3 Shifts:  I/O last 3 completed shifts:  In: 1030 (7.5 mL/kg) [P.O.:480; I.V.:50 (0.4 mL/kg); IV Piggyback:500]  Out: 3575 (26.2 mL/kg) [Urine:3575 (0.7 mL/kg/hr)]  Weight: 136.6 kg     Relevant Results  Results for orders placed or performed during the hospital encounter of 06/14/24 (from the past 24 hour(s))   CBC   Result Value Ref Range    WBC 3.3 (L) 4.4 - 11.3 x10*3/uL    nRBC 0.0 0.0 - 0.0 /100 WBCs    RBC 2.77 (L) 4.50 - 5.90 x10*6/uL    Hemoglobin 8.9 (L) 13.5 - 17.5 g/dL    Hematocrit 27.0 (L) 41.0 - 52.0 %    MCV 98 80 - 100 fL    MCH 32.1 26.0 - 34.0 pg    MCHC 33.0 32.0 - 36.0 g/dL    RDW 15.0 (H) 11.5 - 14.5 %    Platelets 166 150 - 450 x10*3/uL   Comprehensive Metabolic Panel   Result Value Ref Range    Glucose 89 74 - 99 mg/dL    Sodium 130 (L) 136 - 145 mmol/L    Potassium 3.2 (L) 3.5 - 5.3 mmol/L    Chloride 97 (L) 98 - 107 mmol/L    Bicarbonate 27 21 - 32 mmol/L    Anion Gap 9 (L) 10 - 20 mmol/L    Urea Nitrogen 4 (L) 6 - 23 mg/dL    Creatinine 0.57 0.50 - 1.30 mg/dL    eGFR " >90 >60 mL/min/1.73m*2    Calcium 8.0 (L) 8.6 - 10.3 mg/dL    Albumin 2.6 (L) 3.4 - 5.0 g/dL    Alkaline Phosphatase 133 33 - 136 U/L    Total Protein 5.1 (L) 6.4 - 8.2 g/dL    AST 31 9 - 39 U/L    Bilirubin, Total 2.6 (H) 0.0 - 1.2 mg/dL    ALT 13 10 - 52 U/L   Magnesium   Result Value Ref Range    Magnesium 1.81 1.60 - 2.40 mg/dL      Assessment/Plan   Principal Problem:    Hypokalemia    I am seeing him for his gallstones and hyper bilirubinemia.  His bilirubin was 2.6 today.  I do believe he had a common duct stone that he is passed.  He is scheduled for MRCP tomorrow.  We will await those results.  I will write for some sedation for that MRI as well as MiraLAX.      Alaina Grover MD

## 2024-06-16 NOTE — CARE PLAN
The patient's goals for the shift include Labs WNL    The clinical goals for the shift include pt will have snack at hs and npo for procedure in am      Problem: Skin  Goal: Decreased wound size/increased tissue granulation at next dressing change  Outcome: Progressing  Goal: Participates in plan/prevention/treatment measures  Outcome: Progressing  Goal: Prevent/manage excess moisture  Outcome: Progressing  Goal: Prevent/minimize sheer/friction injuries  Outcome: Progressing  Goal: Promote/optimize nutrition  Outcome: Progressing  Goal: Promote skin healing  Outcome: Progressing     Problem: Fall/Injury  Goal: Not fall by end of shift  Outcome: Progressing  Goal: Be free from injury by end of the shift  Outcome: Progressing  Goal: Verbalize understanding of personal risk factors for fall in the hospital  Outcome: Progressing  Goal: Verbalize understanding of risk factor reduction measures to prevent injury from fall in the home  Outcome: Progressing  Goal: Use assistive devices by end of the shift  Outcome: Progressing  Goal: Pace activities to prevent fatigue by end of the shift  Outcome: Progressing     Problem: Nutrition  Goal: Less than 5 days NPO/clear liquids  Outcome: Progressing  Goal: Oral intake greater than 50%  Outcome: Progressing  Goal: Oral intake greater 75%  Outcome: Progressing  Goal: Consume prescribed supplement  Outcome: Progressing  Goal: Adequate PO fluid intake  Outcome: Progressing  Goal: Nutrition support goals are met within 48 hrs  Outcome: Progressing  Goal: Nutrition support is meeting 75% of nutrient needs  Outcome: Progressing  Goal: Tube feed tolerance  Outcome: Progressing  Goal: BG  mg/dL  Outcome: Progressing  Goal: Lab values WNL  Outcome: Progressing  Goal: Electrolytes WNL  Outcome: Progressing  Goal: Promote healing  Outcome: Progressing  Goal: Maintain stable weight  Outcome: Progressing  Goal: Reduce weight from edema/fluid  Outcome: Progressing  Goal: Gradual weight  gain  Outcome: Progressing     Problem: Pain  Goal: Takes deep breaths with improved pain control throughout the shift  Outcome: Progressing  Goal: Turns in bed with improved pain control throughout the shift  Outcome: Progressing  Goal: Walks with improved pain control throughout the shift  Outcome: Progressing  Goal: Performs ADL's with improved pain control throughout shift  Outcome: Progressing  Goal: Participates in PT with improved pain control throughout the shift  Outcome: Progressing  Goal: Free from opioid side effects throughout the shift  Outcome: Progressing  Goal: Free from acute confusion related to pain meds throughout the shift  Outcome: Progressing

## 2024-06-16 NOTE — CARE PLAN
Problem: Skin  Goal: Decreased wound size/increased tissue granulation at next dressing change  Outcome: Progressing  Goal: Participates in plan/prevention/treatment measures  Outcome: Progressing  Goal: Prevent/manage excess moisture  Outcome: Progressing  Goal: Prevent/minimize sheer/friction injuries  Outcome: Progressing  Goal: Promote/optimize nutrition  Outcome: Progressing  Goal: Promote skin healing  Outcome: Progressing     Problem: Fall/Injury  Goal: Not fall by end of shift  Outcome: Progressing  Goal: Be free from injury by end of the shift  Outcome: Progressing  Goal: Verbalize understanding of personal risk factors for fall in the hospital  Outcome: Progressing  Goal: Verbalize understanding of risk factor reduction measures to prevent injury from fall in the home  Outcome: Progressing  Goal: Use assistive devices by end of the shift  Outcome: Progressing  Goal: Pace activities to prevent fatigue by end of the shift  Outcome: Progressing     Problem: Nutrition  Goal: Less than 5 days NPO/clear liquids  Outcome: Progressing  Goal: Oral intake greater than 50%  Outcome: Progressing  Goal: Oral intake greater 75%  Outcome: Progressing  Goal: Consume prescribed supplement  Outcome: Progressing  Goal: Adequate PO fluid intake  Outcome: Progressing  Goal: Nutrition support goals are met within 48 hrs  Outcome: Progressing  Goal: Nutrition support is meeting 75% of nutrient needs  Outcome: Progressing  Goal: Tube feed tolerance  Outcome: Progressing  Goal: BG  mg/dL  Outcome: Progressing  Goal: Lab values WNL  Outcome: Progressing  Goal: Electrolytes WNL  Outcome: Progressing  Goal: Promote healing  Outcome: Progressing  Goal: Maintain stable weight  Outcome: Progressing  Goal: Reduce weight from edema/fluid  Outcome: Progressing  Goal: Gradual weight gain  Outcome: Progressing     Problem: Pain  Goal: Takes deep breaths with improved pain control throughout the shift  Outcome: Progressing  Goal:  Turns in bed with improved pain control throughout the shift  Outcome: Progressing  Goal: Walks with improved pain control throughout the shift  Outcome: Progressing  Goal: Performs ADL's with improved pain control throughout shift  Outcome: Progressing  Goal: Participates in PT with improved pain control throughout the shift  Outcome: Progressing  Goal: Free from opioid side effects throughout the shift  Outcome: Progressing  Goal: Free from acute confusion related to pain meds throughout the shift  Outcome: Progressing     Problem: Pain - Adult  Goal: Verbalizes/displays adequate comfort level or baseline comfort level  Outcome: Progressing     Problem: Safety - Adult  Goal: Free from fall injury  Outcome: Progressing   The patient's goals for the shift include Labs WNL    The clinical goals for the shift include pt will have snack at hs and npo for procedure in am

## 2024-06-17 ENCOUNTER — APPOINTMENT (OUTPATIENT)
Dept: RADIOLOGY | Facility: HOSPITAL | Age: 59
End: 2024-06-17
Payer: MEDICAID

## 2024-06-17 ENCOUNTER — APPOINTMENT (OUTPATIENT)
Dept: NEPHROLOGY | Facility: CLINIC | Age: 59
End: 2024-06-17
Payer: MEDICAID

## 2024-06-17 VITALS
BODY MASS INDEX: 42.16 KG/M2 | RESPIRATION RATE: 16 BRPM | SYSTOLIC BLOOD PRESSURE: 119 MMHG | HEIGHT: 71 IN | HEART RATE: 80 BPM | WEIGHT: 301.15 LBS | TEMPERATURE: 97.8 F | OXYGEN SATURATION: 98 % | DIASTOLIC BLOOD PRESSURE: 75 MMHG

## 2024-06-17 LAB
ALBUMIN SERPL BCP-MCNC: 2.6 G/DL (ref 3.4–5)
ALP SERPL-CCNC: 133 U/L (ref 33–136)
ALT SERPL W P-5'-P-CCNC: 14 U/L (ref 10–52)
ANION GAP SERPL CALC-SCNC: 11 MMOL/L (ref 10–20)
AST SERPL W P-5'-P-CCNC: 34 U/L (ref 9–39)
BILIRUB DIRECT SERPL-MCNC: 1.1 MG/DL (ref 0–0.3)
BILIRUB SERPL-MCNC: 2.3 MG/DL (ref 0–1.2)
BUN SERPL-MCNC: 5 MG/DL (ref 6–23)
CALCIUM SERPL-MCNC: 8.2 MG/DL (ref 8.6–10.3)
CHLORIDE SERPL-SCNC: 98 MMOL/L (ref 98–107)
CO2 SERPL-SCNC: 24 MMOL/L (ref 21–32)
CREAT SERPL-MCNC: 0.61 MG/DL (ref 0.5–1.3)
EGFRCR SERPLBLD CKD-EPI 2021: >90 ML/MIN/1.73M*2
ERYTHROCYTE [DISTWIDTH] IN BLOOD BY AUTOMATED COUNT: 15.2 % (ref 11.5–14.5)
GLUCOSE SERPL-MCNC: 85 MG/DL (ref 74–99)
HCT VFR BLD AUTO: 26.5 % (ref 41–52)
HGB BLD-MCNC: 8.7 G/DL (ref 13.5–17.5)
MCH RBC QN AUTO: 32.2 PG (ref 26–34)
MCHC RBC AUTO-ENTMCNC: 32.8 G/DL (ref 32–36)
MCV RBC AUTO: 98 FL (ref 80–100)
NRBC BLD-RTO: 0 /100 WBCS (ref 0–0)
PLATELET # BLD AUTO: 173 X10*3/UL (ref 150–450)
POTASSIUM SERPL-SCNC: 3.4 MMOL/L (ref 3.5–5.3)
PROT SERPL-MCNC: 5.1 G/DL (ref 6.4–8.2)
RBC # BLD AUTO: 2.7 X10*6/UL (ref 4.5–5.9)
SODIUM SERPL-SCNC: 130 MMOL/L (ref 136–145)
WBC # BLD AUTO: 3.7 X10*3/UL (ref 4.4–11.3)

## 2024-06-17 PROCEDURE — 82248 BILIRUBIN DIRECT: CPT | Performed by: SURGERY

## 2024-06-17 PROCEDURE — 82565 ASSAY OF CREATININE: CPT | Performed by: STUDENT IN AN ORGANIZED HEALTH CARE EDUCATION/TRAINING PROGRAM

## 2024-06-17 PROCEDURE — 2500000002 HC RX 250 W HCPCS SELF ADMINISTERED DRUGS (ALT 637 FOR MEDICARE OP, ALT 636 FOR OP/ED): Performed by: INTERNAL MEDICINE

## 2024-06-17 PROCEDURE — 2500000004 HC RX 250 GENERAL PHARMACY W/ HCPCS (ALT 636 FOR OP/ED): Performed by: NURSE PRACTITIONER

## 2024-06-17 PROCEDURE — 36415 COLL VENOUS BLD VENIPUNCTURE: CPT | Performed by: STUDENT IN AN ORGANIZED HEALTH CARE EDUCATION/TRAINING PROGRAM

## 2024-06-17 PROCEDURE — 2550000001 HC RX 255 CONTRASTS: Performed by: STUDENT IN AN ORGANIZED HEALTH CARE EDUCATION/TRAINING PROGRAM

## 2024-06-17 PROCEDURE — 2500000001 HC RX 250 WO HCPCS SELF ADMINISTERED DRUGS (ALT 637 FOR MEDICARE OP): Performed by: INTERNAL MEDICINE

## 2024-06-17 PROCEDURE — 99222 1ST HOSP IP/OBS MODERATE 55: CPT | Performed by: INTERNAL MEDICINE

## 2024-06-17 PROCEDURE — 76376 3D RENDER W/INTRP POSTPROCES: CPT | Performed by: RADIOLOGY

## 2024-06-17 PROCEDURE — 85027 COMPLETE CBC AUTOMATED: CPT | Performed by: STUDENT IN AN ORGANIZED HEALTH CARE EDUCATION/TRAINING PROGRAM

## 2024-06-17 PROCEDURE — 74183 MRI ABD W/O CNTR FLWD CNTR: CPT

## 2024-06-17 PROCEDURE — 74183 MRI ABD W/O CNTR FLWD CNTR: CPT | Performed by: RADIOLOGY

## 2024-06-17 PROCEDURE — A9575 INJ GADOTERATE MEGLUMI 0.1ML: HCPCS | Performed by: STUDENT IN AN ORGANIZED HEALTH CARE EDUCATION/TRAINING PROGRAM

## 2024-06-17 RX ORDER — GADOTERATE MEGLUMINE 376.9 MG/ML
27 INJECTION INTRAVENOUS
Status: COMPLETED | OUTPATIENT
Start: 2024-06-17 | End: 2024-06-17

## 2024-06-17 RX ORDER — FOLIC ACID 1 MG/1
0.5 TABLET ORAL DAILY
Qty: 15 TABLET | Refills: 0 | Status: SHIPPED | OUTPATIENT
Start: 2024-06-18 | End: 2024-07-18

## 2024-06-17 RX ORDER — FERROUS SULFATE 325(65) MG
65 TABLET ORAL
Qty: 30 TABLET | Refills: 0 | Status: SHIPPED | OUTPATIENT
Start: 2024-06-18 | End: 2024-07-18

## 2024-06-17 RX ORDER — LORAZEPAM 2 MG/ML
1 INJECTION INTRAMUSCULAR ONCE
Status: COMPLETED | OUTPATIENT
Start: 2024-06-17 | End: 2024-06-17

## 2024-06-17 RX ORDER — POTASSIUM CHLORIDE 20 MEQ/1
20 TABLET, EXTENDED RELEASE ORAL 3 TIMES DAILY
Qty: 90 TABLET | Refills: 0 | Status: SHIPPED | OUTPATIENT
Start: 2024-06-17 | End: 2024-07-17

## 2024-06-17 RX ORDER — POTASSIUM CHLORIDE 20 MEQ/1
40 TABLET, EXTENDED RELEASE ORAL ONCE
Status: COMPLETED | OUTPATIENT
Start: 2024-06-17 | End: 2024-06-17

## 2024-06-17 RX ADMIN — LORAZEPAM 1 MG: 2 INJECTION INTRAMUSCULAR; INTRAVENOUS at 11:47

## 2024-06-17 RX ADMIN — POTASSIUM CHLORIDE 40 MEQ: 1500 TABLET, EXTENDED RELEASE ORAL at 09:57

## 2024-06-17 RX ADMIN — GADOTERATE MEGLUMINE 27 ML: 376.9 INJECTION INTRAVENOUS at 13:15

## 2024-06-17 RX ADMIN — HYDROCODONE BITARTRATE AND ACETAMINOPHEN 1 TABLET: 5; 325 TABLET ORAL at 08:48

## 2024-06-17 ASSESSMENT — COGNITIVE AND FUNCTIONAL STATUS - GENERAL
DAILY ACTIVITIY SCORE: 24
MOBILITY SCORE: 24

## 2024-06-17 ASSESSMENT — PAIN SCALES - GENERAL: PAINLEVEL_OUTOF10: 8

## 2024-06-17 ASSESSMENT — PAIN - FUNCTIONAL ASSESSMENT: PAIN_FUNCTIONAL_ASSESSMENT: 0-10

## 2024-06-17 ASSESSMENT — PAIN DESCRIPTION - LOCATION: LOCATION: BACK

## 2024-06-17 NOTE — NURSING NOTE
Discharge Note: 6/17/2024 0919 AVS and pt responsibilities reviewed with pt and copy given. Hypokalemia, Alcohol use, education reviewed with pt and information sheets given. Pt verbalizes understanding of instructions received, verbalizes understanding of when to seek medical attention, denies any home going or personal care needs. Denies further questions or concerns. Reviewed follow up appts with pt and verbalizes understanding. Aamir CUMMINS

## 2024-06-17 NOTE — CONSULTS
Reason For Consult  Hyponatremia and hypokalemia    History Of Present Illness  Tr Arnold is a 58 y.o. male presenting with abnormal lab results and dizziness with lightheadedness.   He was sent here to the hospital secondary to hyponatremia and hypokalemia and was also having dizziness and lightheadedness.  I know him well from the outpatient setting  He stated that he is well aware that he was supposed to stop drinking alcohol and he did not and so presented here to the hospital.  This a.m. he states he is actually feeling better  His potassium is 3.4 today his BUN is 5 with creatinine 1.61 his sodium is 130.  He is complaining of not drinking fluids    Past Medical History  He has a past medical history of Acute lumbosacral myofascial strain (02/09/2023), Acute sinus infection (02/09/2023), Arthritis (02/09/2023), Chronic venous stasis dermatitis of both lower extremities (02/09/2023), Contact dermatitis (02/09/2023), Depression, major, in remission ([facility]) (02/09/2023), Gout, Hypertension, Idiopathic aseptic necrosis of left femur (Multi) (10/13/2021), Left knee injury (02/09/2023), Leg edema (02/09/2023), Low back pain (02/09/2023), Lumbar radiculopathy, chronic (02/09/2023), Night sweat (02/09/2023), Numbness (02/09/2023), Right leg swelling (02/09/2023), Sacroiliitis ([facility]) (02/09/2023), Snoring (02/09/2023), Sodium deficiency (04/21/2023), and Weight gain (02/09/2023).    Surgical History  He has a past surgical history that includes Other surgical history (09/24/2019); Other surgical history (03/18/2021); and Total hip arthroplasty (Left).     Social History  He reports that he has never smoked. He has been exposed to tobacco smoke. His smokeless tobacco use includes chew. He reports that he does not currently use alcohol after a past usage of about 12.0 standard drinks of alcohol per week. He reports that he does not use drugs.    Family History  No family history on file.      Allergies  Pregabalin    Review of Systems  A full 10 point review of systems was obtained is negative except for HPI as above     Physical Exam  Physical Exam  Constitutional:       Appearance: Normal appearance. He is obese.   HENT:      Head: Normocephalic and atraumatic.      Right Ear: External ear normal.      Left Ear: External ear normal.      Nose: Nose normal.      Mouth/Throat:      Mouth: Mucous membranes are moist.      Pharynx: Oropharynx is clear.   Eyes:      Extraocular Movements: Extraocular movements intact.      Conjunctiva/sclera: Conjunctivae normal.      Pupils: Pupils are equal, round, and reactive to light.   Cardiovascular:      Rate and Rhythm: Normal rate and regular rhythm.   Pulmonary:      Effort: Pulmonary effort is normal.      Breath sounds: Normal breath sounds.   Abdominal:      General: Abdomen is flat.      Palpations: Abdomen is soft.   Musculoskeletal:         General: Swelling present.      Right lower leg: Edema present.      Left lower leg: Edema present.   Skin:     General: Skin is warm and dry.   Neurological:      General: No focal deficit present.      Mental Status: He is alert and oriented to person, place, and time.   Psychiatric:         Mood and Affect: Mood normal.         Behavior: Behavior normal.                 I&O 24HR    Intake/Output Summary (Last 24 hours) at 6/17/2024 0729  Last data filed at 6/16/2024 2244  Gross per 24 hour   Intake 1210 ml   Output --   Net 1210 ml       Vitals 24HR  Heart Rate:  [80-97]   Temp:  [36.4 °C (97.5 °F)-36.7 °C (98.1 °F)]   Resp:  [16-18]   BP: (119-146)/(71-75)   SpO2:  [97 %-99 %]     Scheduled medications  enoxaparin, 40 mg, subcutaneous, q12h YOMAIRA  ferrous sulfate (325 mg ferrous sulfate), 65 mg of iron, oral, Daily with breakfast  folic acid, 0.5 mg, oral, Daily      Continuous medications     PRN medications  PRN medications: acetaminophen **OR** acetaminophen **OR** acetaminophen, HYDROcodone-acetaminophen,  magnesium hydroxide, ondansetron ODT **OR** ondansetron      Relevant Results  Results reviewed     Assessment/Plan        Hyponatremia: Mixed picture  Heavy alcohol abuse  Obesity  Peripheral edema with volume overload  Chronic venous stasis of bilateral lower extremities  Chronic back pain  Hypertension  Low osmolar state  Hypokalemia    Plan:  At this time his sodium level is fairly stable  He has a very low BUN and creatinine and has a very low osmolar state.  I have discussed with him in the past that he is going to have to stop drinking alcohol to be able to correct his issues  He also needs a high-protein diet and better nutritional status  Replace his potassium  From the electrolyte standpoint he is fairly stable  I will continue to follow along closely with you  Thanks for the consult    Principal Problem:    Hypokalemia          Francisco Tomlin, DO

## 2024-06-17 NOTE — DISCHARGE INSTRUCTIONS
----------------------------------------------------------------  [facility] Number:  [phone]     Call with any questions about your care and for scheduling assistance.     Calls are returned Monday through Friday between 8 AM and 4:30 PM. We usually get back to you within 2 business days depending on the issue/request.    If we are prescribing your medications:    For opioid medication refills, call the clinic or send a [employer] message 7 days in advance.  Please include:    Name of requested medication    Name of the pharmacy.    For non-opioid medications, call your pharmacy directly to request a refill. Please allow 3-4 days to be processed.     Per [place] State Law:    All controlled substance prescriptions must be filled within 30 days of being written.      For those controlled substances allowing refills, pickup must occur within 30 days of last fill.      We believe regular attendance is key to your success in our program!      Any time you are unable to keep your appointment we ask that you call us at least 24 hours in advance to cancel.This will allow us to offer the appointment time to another patient.     Multiple missed appointments may lead to dismissal from the clinic.       -It is recommended that he stop drinking alcohol  -It is recommended that he follow up with Primary care physician for hospital follow up   -It is recommended that he follow up with Dr. Tomlin to follow up with his electrolytes

## 2024-06-17 NOTE — PROGRESS NOTES
Care Transitions: Patient reviewed in care round meeting this AM and may be medically ready for discharge later today depending on test results. Met with patient at bedside. Verified his plan is to return home with significant other. He stated he would like to try some physical therapy to help his legs. Will update hospital provider to request for PT outpatient services. Care team to follow. Jeniffer Rodriguez RN/TCC

## 2024-06-17 NOTE — CONSULTS
"Nutrition Initial Assessment:   Nutrition Assessment    Reason for Assessment: Admission nursing screening    Patient is a 58 y.o. male presenting with Hyponatremia and hypokalemia     6/17/24 patient seen - stated weight loss over past 5 mo - quit drinking alcohol and needed to lose weight so was watching intake.  Last 6 week unable to eat much due to sore throat and was a urgent care was told was virus.  Intake just starting to improve prior to admission.  Encouraged protein intake with heel PI.    Does not have dentures here and they no longer fit well with weight loss.  Explained he can request softer foods when diet advances.  Stated understands and can eat well without dentures.     Nutrition History:  Energy Intake: Fair 50-75 %  Food and Nutrient History: decreased intake for 6 weeks due to sore throat - was improving prior to admission  Food Allergies/Intolerances:  None  GI Symptoms: None  Oral Problems: Chewing difficulty       Anthropometrics:  Height: 180.3 cm (5' 11\")   Weight: 137 kg (301 lb 2.4 oz)   BMI (Calculated): 42.02  IBW/kg (Dietitian Calculated): 75.5 kg  Percent of IBW: 181 %  Adjusted Body Weight (kg): 90.8 kg    Weight History:   Daily Weight  06/14/24 : 137 kg (301 lb 2.4 oz)  05/14/24 : 147 kg (324 lb)  04/29/24 : (!) 163 kg (360 lb)  02/14/24 : (!) 156 kg (344 lb)  01/24/24 : (!) 152 kg (335 lb)  12/21/23 : (!) 162 kg (358 lb)  12/06/23 : (!) 162 kg (358 lb 3.2 oz)  10/24/23 : (!) 152 kg (336 lb)  09/07/23 : (!) 158 kg (348 lb 6.4 oz)  07/24/23 : (!) 159 kg (349 lb 12.8 oz)     Weight Change %:  Weight History / % Weight Change: loss 34# (10%) in 5  mo  Significant Weight Loss: Yes  Interpretation of Weight Loss: >10% in 6 months    Nutrition Focused Physical Exam Findings:    Subcutaneous Fat Loss:   Orbital Fat Pads: Well nourished (slightly bulging fat pads)  Buccal Fat Pads: Well nourished (full, rounded cheeks)  Triceps: Well nourished (ample fat tissue)  Ribs: Well nourished " (chest is full, ribs do not show, slight to no protrusion of the iliac crest)  Muscle Wasting:  Temporalis: Well nourished (well-defined muscle)  Pectoralis (Clavicular Region): Well nourished (clavicle not visible)  Deltoid/Trapezius: Well nourished (rounded appearance at arm, shoulder, neck)  Trapezius/Infraspinatus/Supraspinatus (Scapular Region): Well nourished (bones not prominent, muscle taut)  Quadriceps: Well nourished (well developed, well rounded)  Gastrocnemius: Well nourished (well developed bulbous muscle)  Edema:     Physical Findings:  Skin: Positive (L heel wound)    Nutrition Significant Labs:  CBC Trend:   Results from last 7 days   Lab Units 06/17/24  0357 06/16/24  0509 06/15/24  0435 06/14/24  1436   WBC AUTO x10*3/uL 3.7* 3.3* 4.2* 6.6   RBC AUTO x10*6/uL 2.70* 2.77* 2.64* 2.90*   HEMOGLOBIN g/dL 8.7* 8.9* 8.6* 9.4*   HEMATOCRIT % 26.5* 27.0* 25.4* 27.8*   MCV fL 98 98 96 96   PLATELETS AUTO x10*3/uL 173 166 162 183    , Liver Function Trend:   Results from last 7 days   Lab Units 06/17/24  0357 06/16/24  0509 06/15/24  0435 06/14/24  1436   ALK PHOS U/L 133 133 135 160*   AST U/L 34 31 31 37   ALT U/L 14 13 13 18   BILIRUBIN TOTAL mg/dL 2.3* 2.6* 3.4* 4.0*    , Renal Lab Trend:   Results from last 7 days   Lab Units 06/17/24  0357 06/16/24  0509 06/15/24  0435 06/14/24  1436   POTASSIUM mmol/L 3.4* 3.2* 2.6* 2.3*   SODIUM mmol/L 130* 130* 129* 127*   MAGNESIUM mg/dL  --  1.81 1.70 1.34*   EGFR mL/min/1.73m*2 >90 >90 >90 >90   BUN mg/dL 5* 4* 5* 5*   CREATININE mg/dL 0.61 0.57 0.64 0.82        Nutrition Specific Medications:  Scheduled medications  ferrous sulfate (325 mg ferrous sulfate), 65 mg of iron, oral, Daily with breakfast  folic acid, 0.5 mg, oral, Daily    I/O:   Last BM Date: 06/14/24;      Dietary Orders (From admission, onward)       Start     Ordered    06/17/24 0001  NPO Diet; Effective midnight  Diet effective midnight         06/16/24 1958                     Estimated Needs:    Total Energy Estimated Needs (kCal): 2800 kCal  Method for Estimating Needs: [place] Jeor = 1785-6028 kcal  Total Protein Estimated Needs (g): 113 g  Method for Estimating Needs: 1.5-2 gm/kg IBW = 113-150 gm  Total Fluid Estimated Needs (mL): 2250 mL           Nutrition Diagnosis   Malnutrition Diagnosis  Patient has Malnutrition Diagnosis: No    Nutrition Diagnosis  Patient has Nutrition Diagnosis: Yes  Diagnosis Status (1): New  Nutrition Diagnosis 1: Predicted inadequate energy intake  Related to (1): sore throat for 6 weeks  As Evidenced by (1): patient stated unable to swallow water - would go days without eating, improving  Additional Nutrition Diagnosis: Diagnosis 3  Diagnosis Status (2): New  Nutrition Diagnosis 2: Obese  Related to (2): hisotry of excess calorie intake  As Evidenced by (2): BMI 42 kg/m2  Diagnosis Status (3): New  Nutrition Diagnosis 3: Increased nutrient needs  Related to (3): healing process  As Evidenced by (3): PI left heel       Nutrition Interventions/Recommendations         Nutrition Prescription:  Individualized Nutrition Prescription Provided for : Oral nutrition        Nutrition Interventions:   Interventions: Meals and snacks  Meals and Snacks: Fat-modified diet  Goal: Recommend low fat diet when resumed    Collaboration and Referral of Nutrition Care: Team meeting involving nutrition professional  Goal: IDT meeting    Nutrition Education:   Encouraged protein intake for healing       Nutrition Monitoring and Evaluation   Food/Nutrient Related History Monitoring  Monitoring and Evaluation Plan: Amount of food  Amount of Food: Estimated amout of food  Criteria: Consume >75% meals    Body Composition/Growth/Weight History  Monitoring and Evaluation Plan: BMI  Body Mass: Body mass index (BMI)  Criteria: Would benefit from slow weight loss toward healthy BMI    Biochemical Data, Medical Tests and Procedures  Monitoring and Evaluation Plan: Electrolyte/renal panel, Nutritional  anemia profile  Electrolyte and Renal Panel: Potassium  Criteria: WNL  Nutritional Anemia Profile: Folate, serum  Criteria: WNL       Time Spent/Follow-up Reminder:   Time Spent (min): 30 minutes  Last Date of Nutrition Visit: 06/17/24  Nutrition Follow-Up Needed?: 5-7 days  Follow up Comment: po, medical work up        Niki Martinez RDN, LD

## 2024-06-17 NOTE — CARE PLAN
The patient's goals for the shift include Labs WNL    The clinical goals for the shift include electrolytes WNL    Over the shift, the patient did not make progress toward the following goals. Barriers to progression include . Recommendations to address these barriers include .

## 2024-06-17 NOTE — DISCHARGE SUMMARY
Discharge Diagnosis  Hypokalemia    Issues Requiring Follow-Up  Follow up with Primary care physician for hospital follow up     Discharge Meds     Your medication list        START taking these medications        Instructions Last Dose Given Next Dose Due   ferrous sulfate (325 mg ferrous sulfate) tablet  Start taking on: June 18, 2024      Take 1 tablet by mouth once daily with breakfast.       folic acid 1 mg tablet  Commonly known as: Folvite  Start taking on: June 18, 2024      Take 0.5 tablets (0.5 mg) by mouth once daily.              CONTINUE taking these medications        Instructions Last Dose Given Next Dose Due   allopurinol 300 mg tablet  Commonly known as: Zyloprim      take 1 tablet by mouth once daily       Biotene Dry Mouth Oral Rinse solution  Generic drug: moisturizing mouth      Swish and spit 15 mL 3 times a day as needed (Dry mouth).       calcium carbonate 600 mg calcium (1,500 mg) tablet           DosoKap 137.5-200 mcg tablet  Generic drug: vitamin D3-vitamin K2           DULoxetine 60 mg DR capsule  Commonly known as: Cymbalta      Take 1 capsule (60 mg) by mouth once daily.       furosemide 40 mg tablet  Commonly known as: Lasix      Take 1 tablet (40 mg) by mouth 3 times a day.       HYDROcodone-acetaminophen  mg tablet  Commonly known as: Norco      Take 1 tablet by mouth every 8 hours if needed for severe pain (7 - 10).       lisinopril 40 mg tablet      Take 1 tablet (40 mg) by mouth once daily.       magnesium oxide 400 mg tablet  Commonly known as: Mag-Ox           multivitamin tablet           mupirocin 2 % ointment  Commonly known as: Bactroban           potassium chloride CR 20 mEq ER tablet  Commonly known as: Klor-Con M20      Take 1 tablet (20 mEq) by mouth 3 times a day. Do not crush or chew.       sodium chloride 1,000 mg tablet      Take 3 tablets (3 g) by mouth 3 times a day.       testosterone cypionate 200 mg/mL injection  Commonly known as: Depo-Testosterone       inject 2 milliliter intramuscularly every MONTH                 Where to Get Your Medications        These medications were sent to [id number] - [address] [address]      Phone: [phone]   ferrous sulfate (325 mg ferrous sulfate) tablet  folic acid 1 mg tablet  potassium chloride CR 20 mEq ER tablet         Test Results Pending At Discharge  Pending Labs       Order Current Status    Osmolality, urine In process    Urine electrolytes In process            Hospital Course   58-year-old morbidly obese male with a past medical history of hyponatremia, dysphagia, compression fracture of L4 vertebra, Chronic low back pain on Norco, left foot wound, left hip avascular necrosis status post hip replacement, depression, hypertension, chronic leg edema with bilateral chronic venous stasis changes on Lasix, gout, osteoporosis, right knee pain, left shoulder pain, alcohol abuse, alcoholic neuropathy, low testosterone levels, and anxiety who was sent from his nephrologist office to the emergency room after outpatient blood workup showed hypokalemia. He was seen by Dr. Tomlin due to hypokalemia and pt given replacement, it was recommended that he stop drinking alcohol. It is recommended that he follow up with him in the office for follow up. Pt was also seen by surgery due to elevated bilirubin and pt was thought that he had common bile duct stone that was passed, he had MRCP and dilation was found. Pt tolerated his diet with no complication.     Pertinent Physical Exam At Time of Discharge  Physical Exam  General: awake, alert and oriented. No acute distress.   Skin: Skin is warm, dry and intact without rashes or lesions.   HEENT: normocephalic, atraumatic; conjunctivae are clear without exudates or hemorrhage. Sclera is non-icteric. Eyelids are normal in appearance without swelling or lesions. Hearing intact. Nares are patent bilaterally. Moist mucous membranes.    Cardiovascular: heart regular rate and rhythm   Respiratory: bilateral lung sounds clear to auscultation  Gastrointestinal: Bsx4, non-distended, non-tender, obese   Genitourinary: exam deferred  Musculoskeletal: no deformities  Extremities: pulses palpable bilaterally; no swelling or erythema  Neurological: no focal deficits  Psychiatric: appropriate mood and affect; good judgment and insight    Outpatient Follow-Up  Future Appointments   Date Time Provider Department Center   6/24/2024  1:30 PM Stephen Isaac MD [id number]   7/8/2024  7:30 AM [place] [id number] JAMES Paris, APRN-CNP

## 2024-06-18 ENCOUNTER — PATIENT OUTREACH (OUTPATIENT)
Dept: PRIMARY CARE | Facility: CLINIC | Age: 59
End: 2024-06-18
Payer: MEDICAID

## 2024-06-18 NOTE — PROGRESS NOTES
Discharge facility: [facility]  Discharge diagnosis: Hypokalemia   Admission date: 6/14/24  Discharge date: 6/17/24    PCP Appointment Date: No available appointments within 14 days/ message to office   Specialist Appointment Date:6/20/24 Endo, 6-24-24, urology 6/27/24 nephrology  Hospital Encounter and Summary: Linked    See Discharge assessment below for further details    Medications  Medications reviewed with patient/caregiver?: Yes (6/18/2024  9:28 AM)  Is the patient having any side effects they believe may be caused by any medication additions or changes?: No (6/18/2024  9:28 AM)  Does the patient have all medications ordered at discharge?: No (6/18/2024  9:28 AM)  What is keeping the patient from filling the prescriptions?: -- (Going to pharmacy to pick them up) (6/18/2024  9:28 AM)  Care Management Interventions: Provided patient education (6/18/2024  9:28 AM)  Prescription Comments: Prescriptions sent for ferrous sulfate (325 mg ferrous sulfate) tablet  folic acid 1 mg tablet    potassium chloride CR 20 mEq ER tablet (6/18/2024  9:28 AM)  Is the patient taking all medications as directed (includes completed medication regime)?: No (6/18/2024  9:28 AM)  What is preventing the patient from taking all medications as directed?: Other (Comment) (Reports he will take them after he obtains them from pharmacy) (6/18/2024  9:28 AM)  Care Management Interventions: Provided patient education (6/18/2024  9:28 AM)  Medication Comments: Instructed on new medications of START taking these medications         Instructions       ferrous sulfate (325 mg ferrous sulfate) tablet           Take 1 tablet by mouth once daily with breakfast.           folic acid 1 mg tablet  Commonly known as: Folvite          Take 0.5 tablets (0.5 mg) by mouth once daily.  Instructed potassium chloride CR 20 mEq ER tablet  Commonly known as: Klor-Con M20  Take 1 tablet (20 mEq) by mouth 3 times a day.  Do not crush or chew.  (6/18/2024  9:28 AM)  Follow Up Tasks: -- (n/a) (6/18/2024  9:28 AM)    Appointments  Does the patient have a primary care provider?: Yes (6/18/2024  9:28 AM)  Care Management Interventions: Educated patient on importance of making appointment (no avilable appts , encouraged to call office) (6/18/2024  9:28 AM)  Has the patient kept scheduled appointments due by today?: Yes (6/18/2024  9:28 AM)  Care Management Interventions: -- (Encouraged to call and scheduled , message sent to office) (6/18/2024  9:28 AM)  Follow Up Tasks: -- (n/a) (6/18/2024  9:28 AM)    Self Management  What is the home health agency?: n/a (6/18/2024  9:28 AM)  Has home health visited the patient within 72 hours of discharge?: Not applicable (6/18/2024  9:28 AM)  Home Health Interventions: -- (n/a) (6/18/2024  9:28 AM)  What Durable Medical Equipment (DME) was ordered?: n/a (6/18/2024  9:28 AM)  Has all Durable Medical Equipment (DME) been delivered?: -- (n/a) (6/18/2024  9:28 AM)  DME Interventions: -- (n/a) (6/18/2024  9:28 AM)  Care Management Interventions: Notified PCP/provider (6/18/2024  9:28 AM)  Follow Up Tasks: -- (n/a) (6/18/2024  9:28 AM)    Patient Teaching  Does the patient have access to their discharge instructions?: Yes (6/18/2024  9:28 AM)  Care Management Interventions: Reviewed instructions with patient (6/18/2024  9:28 AM)  What is the patient's perception of their health status since discharge?: Improving (6/18/2024  9:28 AM)  Is the patient/caregiver able to teach back the hierarchy of who to call/visit for symptoms/problems? PCP, Specialist, Home Health nurse, Urgent Care, ED, 911: Yes (6/18/2024  9:28 AM)  Patient/Caregiver Education Comments: Instructed on hospital discharge instructions. Instructed on new and changed medications. Instructed if increased shortness of breath or chest pains to call 911. Provided my contact information and encouraged to call with any questions (6/18/2024  9:28 AM)    Wrap Up  Is the  patient/caregiver familiar with Advance Care Planning?: Yes (6/18/2024  9:28 AM)  Would the patient like more information on Advance Care Planning?: No (6/18/2024  9:28 AM)  Wrap Up Additional Comments: Patient reports feeling better. [facility] was [facility] to [facility] after hanging up with this call. (6/18/2024  9:28 AM)

## 2024-06-20 LAB
ATRIAL RATE: 88 BPM
P AXIS: -18 DEGREES
P OFFSET: 202 MS
P ONSET: 149 MS
PR INTERVAL: 146 MS
Q ONSET: 222 MS
QRS COUNT: 14 BEATS
QRS DURATION: 112 MS
QT INTERVAL: 430 MS
QTC CALCULATION(BAZETT): 520 MS
QTC FREDERICIA: 488 MS
R AXIS: -6 DEGREES
T AXIS: -1 DEGREES
T OFFSET: 437 MS
VENTRICULAR RATE: 88 BPM

## 2024-06-24 ENCOUNTER — APPOINTMENT (OUTPATIENT)
Dept: UROLOGY | Facility: CLINIC | Age: 59
End: 2024-06-24
Payer: MEDICAID

## 2024-06-26 ENCOUNTER — LAB (OUTPATIENT)
Dept: LAB | Facility: LAB | Age: 59
End: 2024-06-26
Payer: MEDICAID

## 2024-06-26 DIAGNOSIS — E87.6 HYPOKALEMIA: ICD-10-CM

## 2024-06-26 LAB
ANION GAP SERPL CALC-SCNC: 13 MMOL/L (ref 10–20)
BUN SERPL-MCNC: 7 MG/DL (ref 6–23)
CALCIUM SERPL-MCNC: 8.3 MG/DL (ref 8.6–10.3)
CHLORIDE SERPL-SCNC: 97 MMOL/L (ref 98–107)
CO2 SERPL-SCNC: 24 MMOL/L (ref 21–32)
CREAT SERPL-MCNC: 0.75 MG/DL (ref 0.5–1.3)
EGFRCR SERPLBLD CKD-EPI 2021: >90 ML/MIN/1.73M*2
GLUCOSE SERPL-MCNC: 92 MG/DL (ref 74–99)
POTASSIUM SERPL-SCNC: 4.2 MMOL/L (ref 3.5–5.3)
SODIUM SERPL-SCNC: 130 MMOL/L (ref 136–145)

## 2024-06-26 PROCEDURE — 36415 COLL VENOUS BLD VENIPUNCTURE: CPT

## 2024-06-26 PROCEDURE — 80048 BASIC METABOLIC PNL TOTAL CA: CPT

## 2024-06-27 ENCOUNTER — APPOINTMENT (OUTPATIENT)
Dept: NEPHROLOGY | Facility: CLINIC | Age: 59
End: 2024-06-27
Payer: MEDICAID

## 2024-06-27 VITALS
HEIGHT: 71 IN | DIASTOLIC BLOOD PRESSURE: 62 MMHG | HEART RATE: 87 BPM | WEIGHT: 308.4 LBS | SYSTOLIC BLOOD PRESSURE: 130 MMHG | BODY MASS INDEX: 43.18 KG/M2

## 2024-06-27 DIAGNOSIS — E87.6 HYPOKALEMIA: ICD-10-CM

## 2024-06-27 DIAGNOSIS — F10.10 ALCOHOL ABUSE: ICD-10-CM

## 2024-06-27 DIAGNOSIS — I10 PRIMARY HYPERTENSION: ICD-10-CM

## 2024-06-27 DIAGNOSIS — E87.1 HYPONATREMIA: Primary | ICD-10-CM

## 2024-06-27 PROCEDURE — 3075F SYST BP GE 130 - 139MM HG: CPT | Performed by: INTERNAL MEDICINE

## 2024-06-27 PROCEDURE — 3078F DIAST BP <80 MM HG: CPT | Performed by: INTERNAL MEDICINE

## 2024-06-27 PROCEDURE — 99213 OFFICE O/P EST LOW 20 MIN: CPT | Performed by: INTERNAL MEDICINE

## 2024-06-27 ASSESSMENT — ENCOUNTER SYMPTOMS
NEUROLOGICAL NEGATIVE: 1
PSYCHIATRIC NEGATIVE: 1
ENDOCRINE NEGATIVE: 1
CONSTITUTIONAL NEGATIVE: 1
HEMATOLOGIC/LYMPHATIC NEGATIVE: 1
CARDIOVASCULAR NEGATIVE: 1
ALLERGIC/IMMUNOLOGIC NEGATIVE: 1
GASTROINTESTINAL NEGATIVE: 1
EYES NEGATIVE: 1
RESPIRATORY NEGATIVE: 1
MUSCULOSKELETAL NEGATIVE: 1

## 2024-06-27 NOTE — PROGRESS NOTES
Subjective   He has not drank any beer for 10 week snow.  Stan molina ETOH altogether  Has had some constipation  Patient ID: Tr Arnold is a 58 y.o. male who presents for Follow-up (Hospital /Review labs 6/26).  HPI  He is here for follow-up secondary to hyponatremia with hypokalemia and a recent hospitalization.  I saw him there at the hospital he was discharged  His sodium yesterday was 130 potassium is normal rest of his electrolytes look pretty normal calcium just on the low side at 8.3  Medications are reviewed he is on salt tablets and Lasix  Review of Systems   Constitutional: Negative.    HENT: Negative.     Eyes: Negative.    Respiratory: Negative.     Cardiovascular: Negative.    Gastrointestinal: Negative.    Endocrine: Negative.    Genitourinary: Negative.    Musculoskeletal: Negative.    Skin: Negative.    Allergic/Immunologic: Negative.    Neurological: Negative.    Hematological: Negative.    Psychiatric/Behavioral: Negative.         Objective   Physical Exam  Constitutional:       Appearance: Normal appearance.   HENT:      Head: Normocephalic and atraumatic.      Right Ear: External ear normal.      Left Ear: External ear normal.      Nose: Nose normal.      Mouth/Throat:      Mouth: Mucous membranes are moist.      Pharynx: Oropharynx is clear.   Eyes:      Extraocular Movements: Extraocular movements intact.      Conjunctiva/sclera: Conjunctivae normal.      Pupils: Pupils are equal, round, and reactive to light.   Cardiovascular:      Rate and Rhythm: Normal rate and regular rhythm.   Pulmonary:      Effort: Pulmonary effort is normal.      Breath sounds: Normal breath sounds.   Abdominal:      General: Abdomen is flat.      Palpations: Abdomen is soft.   Musculoskeletal:         General: Swelling present.      Right lower leg: Edema present.      Left lower leg: Edema present.   Skin:     General: Skin is warm and dry.      Comments: Chronic venous stasis of B/L LE   Neurological:       General: No focal deficit present.      Mental Status: He is alert and oriented to person, place, and time.   Psychiatric:         Mood and Affect: Mood normal.         Behavior: Behavior normal.         Assessment/Plan   Problem List Items Addressed This Visit             ICD-10-CM    Hypertension I10    Hyponatremia - Primary E87.1    Relevant Orders    Basic metabolic panel    Follow Up In Nephrology    Alcohol abuse F10.10    Hypokalemia E87.6   Plan:  Has stopped drinking ETOH  Continue without ETOH and continue meds as he is on  Follow labs in 3 months.    Hyponatremia: Mixed picture  Heavy alcohol abuse:  Last Drink May 12, 2024  Obesity  Peripheral edema with volume overload  Chronic venous stasis of bilateral lower extremities  Chronic back pain  Hypertension  Low osmolar state  Hypokalemia       Francisco Tomlin DO 06/27/24 10:41 AM

## 2024-07-03 ENCOUNTER — PATIENT OUTREACH (OUTPATIENT)
Dept: PRIMARY CARE | Facility: CLINIC | Age: 59
End: 2024-07-03
Payer: MEDICAID

## 2024-07-03 NOTE — PROGRESS NOTES
Unable to reach patient for call back after patient's follow up appointment with PCP.   FARHANM with call back number for patient to call if needed   If no voicemail available call attempts x 2 were made to contact the patient to assist with any questions or concerns patient may have.

## 2024-07-08 ENCOUNTER — APPOINTMENT (OUTPATIENT)
Dept: UROLOGY | Facility: CLINIC | Age: 59
End: 2024-07-08
Payer: MEDICAID

## 2024-07-08 ENCOUNTER — HOSPITAL ENCOUNTER (OUTPATIENT)
Dept: OPERATING ROOM | Facility: HOSPITAL | Age: 59
Setting detail: OUTPATIENT SURGERY
Discharge: HOME | End: 2024-07-08
Payer: MEDICAID

## 2024-07-08 ENCOUNTER — ANESTHESIA EVENT (OUTPATIENT)
Dept: OPERATING ROOM | Facility: HOSPITAL | Age: 59
End: 2024-07-08
Payer: MEDICAID

## 2024-07-08 ENCOUNTER — ANESTHESIA (OUTPATIENT)
Dept: OPERATING ROOM | Facility: HOSPITAL | Age: 59
End: 2024-07-08
Payer: MEDICAID

## 2024-07-08 VITALS
OXYGEN SATURATION: 96 % | SYSTOLIC BLOOD PRESSURE: 142 MMHG | WEIGHT: 296.74 LBS | RESPIRATION RATE: 18 BRPM | HEART RATE: 89 BPM | BODY MASS INDEX: 42.48 KG/M2 | HEIGHT: 70 IN | DIASTOLIC BLOOD PRESSURE: 78 MMHG | TEMPERATURE: 99 F

## 2024-07-08 DIAGNOSIS — R13.10 DYSPHAGIA, UNSPECIFIED TYPE: ICD-10-CM

## 2024-07-08 PROCEDURE — 2500000005 HC RX 250 GENERAL PHARMACY W/O HCPCS: Performed by: ANESTHESIOLOGY

## 2024-07-08 PROCEDURE — 3700000001 HC GENERAL ANESTHESIA TIME - INITIAL BASE CHARGE: Performed by: INTERNAL MEDICINE

## 2024-07-08 PROCEDURE — 3700000002 HC GENERAL ANESTHESIA TIME - EACH INCREMENTAL 1 MINUTE: Performed by: INTERNAL MEDICINE

## 2024-07-08 PROCEDURE — 3600000002 HC OR TIME - INITIAL BASE CHARGE - PROCEDURE LEVEL TWO: Performed by: INTERNAL MEDICINE

## 2024-07-08 PROCEDURE — 43239 EGD BIOPSY SINGLE/MULTIPLE: CPT | Performed by: INTERNAL MEDICINE

## 2024-07-08 PROCEDURE — 7100000009 HC PHASE TWO TIME - INITIAL BASE CHARGE: Performed by: INTERNAL MEDICINE

## 2024-07-08 PROCEDURE — 2500000004 HC RX 250 GENERAL PHARMACY W/ HCPCS (ALT 636 FOR OP/ED): Performed by: ANESTHESIOLOGY

## 2024-07-08 PROCEDURE — 2500000004 HC RX 250 GENERAL PHARMACY W/ HCPCS (ALT 636 FOR OP/ED): Mod: JZ | Performed by: INTERNAL MEDICINE

## 2024-07-08 PROCEDURE — 3600000007 HC OR TIME - EACH INCREMENTAL 1 MINUTE - PROCEDURE LEVEL TWO: Performed by: INTERNAL MEDICINE

## 2024-07-08 PROCEDURE — 2500000005 HC RX 250 GENERAL PHARMACY W/O HCPCS: Performed by: INTERNAL MEDICINE

## 2024-07-08 PROCEDURE — 7100000010 HC PHASE TWO TIME - EACH INCREMENTAL 1 MINUTE: Performed by: INTERNAL MEDICINE

## 2024-07-08 RX ORDER — SODIUM CHLORIDE, SODIUM LACTATE, POTASSIUM CHLORIDE, CALCIUM CHLORIDE 600; 310; 30; 20 MG/100ML; MG/100ML; MG/100ML; MG/100ML
50 INJECTION, SOLUTION INTRAVENOUS CONTINUOUS
Status: DISCONTINUED | OUTPATIENT
Start: 2024-07-08 | End: 2024-07-09 | Stop reason: HOSPADM

## 2024-07-08 RX ORDER — GLYCOPYRROLATE 0.2 MG/ML
0.2 INJECTION INTRAMUSCULAR; INTRAVENOUS ONCE
Status: DISCONTINUED | OUTPATIENT
Start: 2024-07-08 | End: 2024-07-08 | Stop reason: HOSPADM

## 2024-07-08 RX ORDER — PROPOFOL 10 MG/ML
INJECTION, EMULSION INTRAVENOUS CONTINUOUS PRN
Status: DISCONTINUED | OUTPATIENT
Start: 2024-07-08 | End: 2024-07-08

## 2024-07-08 RX ORDER — CEFAZOLIN SODIUM 1 G/50ML
1 SOLUTION INTRAVENOUS ONCE
Status: COMPLETED | OUTPATIENT
Start: 2024-07-08 | End: 2024-07-08

## 2024-07-08 ASSESSMENT — PAIN SCALES - GENERAL
PAINLEVEL_OUTOF10: 0 - NO PAIN
PAINLEVEL_OUTOF10: 9
PAINLEVEL_OUTOF10: 0 - NO PAIN
PAINLEVEL_OUTOF10: 0 - NO PAIN

## 2024-07-08 ASSESSMENT — PAIN DESCRIPTION - DESCRIPTORS: DESCRIPTORS: ACHING

## 2024-07-08 ASSESSMENT — PAIN - FUNCTIONAL ASSESSMENT
PAIN_FUNCTIONAL_ASSESSMENT: 0-10
PAIN_FUNCTIONAL_ASSESSMENT: 0-10

## 2024-07-08 NOTE — ANESTHESIA POSTPROCEDURE EVALUATION
Patient: Tr Arnold    Procedure Summary       Date: 07/08/24 Room / Location: [facility] OR    Anesthesia Start: 0747 Anesthesia Stop: 0800    Procedures:       AMB REFERRAL TO GASTROENTEROLOGY      EGD Diagnosis: Dysphagia, unspecified type    Scheduled Providers: Eduard Hall DO; Scott Martinez RN; Julieta Anderson RN; Torrey Ny MD Responsible Provider: Torrey Ny MD    Anesthesia Type: MAC ASA Status: 2            Anesthesia Type: MAC    Vitals Value Taken Time   /66 07/08/24 0801   Temp 37.2 °C (99 °F) 07/08/24 0801   Pulse 89 07/08/24 0801   Resp 16 07/08/24 0801   SpO2 95 % 07/08/24 0801       Anesthesia Post Evaluation    Patient location during evaluation: bedside  Patient participation: complete - patient participated  Level of consciousness: sleepy but conscious  Pain management: adequate  Airway patency: patent  Cardiovascular status: acceptable  Respiratory status: acceptable  Hydration status: acceptable  Postoperative Nausea and Vomiting: none    No notable events documented.

## 2024-07-08 NOTE — Clinical Note
Pt transported to pt room in ACS. Wheels locked, siderails up, report given to receiving PACU nurse.

## 2024-07-08 NOTE — H&P
History Of Present Illness  Tr Arnold is a 58 y.o. male presenting with normochromic normocytic anemia presents for EGD and colonoscopy.  Recent hospitalized 3 weeks ago for acute elevation of transaminases with hypokalemia.  MRCP showed dilated common bile duct with cholelithiasis without cholecystitis felt to have common duct stone in the past elevated alk phos. he does state he had a very large drinking history up until 12 weeks ago he was drinking about a 12 pack a day.  He denies any family history of liver failure.  He is having no right upper quadrant pain at this time denies rectal bleeding or melena.  Because of his profound anemia he is undergoing EGD at this time..     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    Avascular necrosis of left femur    Left knee injury 02/09/2023    Leg edema 02/09/2023    Low back pain 02/09/2023    Lumbar radiculopathy, chronic 02/09/2023    Night sweat 02/09/2023    Numbness 02/09/2023    Right leg swelling 02/09/2023    Sacroiliitis (CMS-HCC) 02/09/2023    Snoring 02/09/2023    Sodium deficiency 04/21/2023    Weight gain 02/09/2023     Surgical History  Past Surgical History:   Procedure Laterality Date    OTHER SURGICAL HISTORY  09/24/2019    Hernia repair    OTHER SURGICAL HISTORY  03/18/2021    Intra-articular injection    TOTAL HIP ARTHROPLASTY Left      Social History  He reports that he has never smoked. He has been exposed to tobacco smoke. His smokeless tobacco use includes chew. He reports that he does not currently use alcohol after a past usage of about 12.0 standard drinks of  alcohol per week. He reports that he does not use drugs.    Family History  No family history on file.     Allergies  Allergies   Allergen Reactions    Pregabalin Swelling     Review of Systems  Pre-sedation Evaluation:  ASA Classification - ASA 3 - Patient with moderate systemic disease with functional limitations  Mallampati Score - III (soft and hard palate and base of uvula visible)    Physical Exam  Vitals and nursing note reviewed.   Constitutional:       Appearance: Normal appearance.   HENT:      Head: Normocephalic.      Mouth/Throat:      Mouth: Mucous membranes are moist.      Pharynx: Oropharynx is clear.   Eyes:      Pupils: Pupils are equal, round, and reactive to light.   Cardiovascular:      Rate and Rhythm: Normal rate and regular rhythm.      Heart sounds: Normal heart sounds.   Pulmonary:      Effort: Pulmonary effort is normal.      Breath sounds: Normal breath sounds.   Abdominal:      General: Abdomen is flat. Bowel sounds are normal.      Palpations: Abdomen is soft.   Musculoskeletal:         General: Normal range of motion.      Cervical back: Normal range of motion and neck supple.   Skin:     General: Skin is warm and dry.   Neurological:      General: No focal deficit present.      Mental Status: He is alert and oriented to person, place, and time.   Psychiatric:         Mood and Affect: Mood normal.         Behavior: Behavior normal.          Last Recorded Vitals  There were no vitals taken for this visit.     Assessment/Plan   Problem List Items Addressed This Visit    None  Visit Diagnoses       Dysphagia, unspecified type        Relevant Orders    Referral to Gastroenterology    Esophagogastroduodenoscopy (EGD)               PTA/Current Medications:  (Not in a hospital admission)    Current Outpatient Medications   Medication Sig Dispense Refill    calcium carbonate 600 mg calcium (1,500 mg) tablet Take 1 tablet (600 mg) by mouth once daily.      ferrous sulfate, 325 mg ferrous  sulfate, tablet Take 1 tablet by mouth once daily with breakfast. 30 tablet 0    folic acid (Folvite) 1 mg tablet Take 0.5 tablets (0.5 mg) by mouth once daily. 15 tablet 0    furosemide (Lasix) 40 mg tablet Take 1 tablet (40 mg) by mouth 3 times a day. 270 tablet 3    HYDROcodone-acetaminophen (Norco)  mg tablet Take 1 tablet by mouth every 8 hours if needed for severe pain (7 - 10). 90 tablet 0    lisinopril 40 mg tablet Take 1 tablet (40 mg) by mouth once daily. 30 tablet 11    magnesium oxide (Mag-Ox) 400 mg tablet Take 1 tablet (400 mg) by mouth once daily. 250mg dose?      multivitamin tablet Take 1 tablet by mouth once daily.      mupirocin (Bactroban) 2 % ointment Apply topically. Apply to wound daily      potassium chloride CR (Klor-Con M20) 20 mEq ER tablet Take 1 tablet (20 mEq) by mouth 3 times a day. Do not crush or chew. 90 tablet 0    sodium chloride 1,000 mg tablet Take 3 tablets (3 g) by mouth 3 times a day. (Patient taking differently: Take 3 tablets (3 g) by mouth 3 times a day.) 810 tablet 3    testosterone cypionate (Depo-Testosterone) 200 mg/mL injection inject 2 milliliter intramuscularly every MONTH 2 mL 2    vitamin D3-vitamin K2 (DosoKap) 137.5-200 mcg tablet Take 2 Units by mouth 2 times a day. Pt takes 1 tab 2x/day       No current facility-administered medications for this encounter.     Eduard Hall, DO

## 2024-07-08 NOTE — ANESTHESIA PREPROCEDURE EVALUATION
Patient: Tr Arnold    Procedure Information       Date/Time: 07/08/24 3454    Scheduled providers: Eduard Hall DO; Scott Martinez RN; Julieta Anderson RN; Torrey Ny MD    Procedures:       AMB REFERRAL TO GASTROENTEROLOGY      EGD    Location: [facility] OR            Relevant Problems   Cardiac   (+) Hypertension      Neuro   (+) Anxiety   (+) Neuropathy, alcoholic (Multi)      /Renal   (+) Hyponatremia      HEENT   (+) Acute non-recurrent frontal sinusitis       Clinical information reviewed:                   NPO Detail:  No data recorded     Physical Exam    Airway  Mallampati: II  TM distance: >3 FB  Neck ROM: full     Cardiovascular   Rhythm: regular  Rate: normal     Dental    Pulmonary    Abdominal          Anesthesia Plan    History of general anesthesia?: yes  History of complications of general anesthesia?: no    ASA 2     MAC     Anesthetic plan and risks discussed with patient.

## 2024-07-15 DIAGNOSIS — S32.040G COMPRESSION FRACTURE OF L4 VERTEBRA WITH DELAYED HEALING, SUBSEQUENT ENCOUNTER: ICD-10-CM

## 2024-07-15 RX ORDER — HYDROCODONE BITARTRATE AND ACETAMINOPHEN 10; 325 MG/1; MG/1
1 TABLET ORAL EVERY 8 HOURS PRN
Qty: 90 TABLET | Refills: 0 | Status: SHIPPED | OUTPATIENT
Start: 2024-07-15

## 2024-07-16 LAB
LABORATORY COMMENT REPORT: NORMAL
PATH REPORT.FINAL DX SPEC: NORMAL
PATH REPORT.GROSS SPEC: NORMAL
PATH REPORT.TOTAL CANCER: NORMAL

## 2024-07-23 ENCOUNTER — TELEPHONE (OUTPATIENT)
Dept: GASTROENTEROLOGY | Facility: CLINIC | Age: 59
End: 2024-07-23
Payer: MEDICAID

## 2024-07-23 NOTE — TELEPHONE ENCOUNTER
PATIENT NOTIFIED AND VERBALIZED UNDERSTANDING     Patient will be losing insurance at the end of the month. He will call us when he has new insurance squared away to get colonoscopy scheduled.    ----- Message from Eduard Hall sent at 7/16/2024  3:11 PM EDT -----  Upper endoscopy shows no evidence of GI bleeding source he did have mild chronic gastritis biopsies showed no H. pylori no celiac disease.    Given his anemia recommend colonoscopy.  Please arrange.

## 2024-07-30 ENCOUNTER — PATIENT OUTREACH (OUTPATIENT)
Dept: PRIMARY CARE | Facility: CLINIC | Age: 59
End: 2024-07-30
Payer: MEDICAID

## 2024-09-09 DIAGNOSIS — S32.040G COMPRESSION FRACTURE OF L4 VERTEBRA WITH DELAYED HEALING, SUBSEQUENT ENCOUNTER: ICD-10-CM

## 2024-09-09 RX ORDER — HYDROCODONE BITARTRATE AND ACETAMINOPHEN 10; 325 MG/1; MG/1
1 TABLET ORAL EVERY 8 HOURS PRN
Qty: 90 TABLET | Refills: 0 | Status: SHIPPED | OUTPATIENT
Start: 2024-09-09

## 2024-09-10 ENCOUNTER — PATIENT OUTREACH (OUTPATIENT)
Age: 59
End: 2024-09-10
Payer: COMMERCIAL

## 2024-09-10 NOTE — PROGRESS NOTES
Final call.  CM called and spoke with pt to address any final questions or concerns regarding hospitalization.  Pt reports doing well and has no concerns at this time.  He is following up with nephrology. He has had to change providers due to he changed his insurance. Pt given my contact info  in the event questions should arise.

## 2024-09-23 ENCOUNTER — APPOINTMENT (OUTPATIENT)
Age: 59
End: 2024-09-23
Payer: COMMERCIAL

## 2024-09-23 ENCOUNTER — LAB (OUTPATIENT)
Facility: LAB | Age: 59
End: 2024-09-23
Payer: COMMERCIAL

## 2024-09-23 VITALS
BODY MASS INDEX: 41.23 KG/M2 | WEIGHT: 288 LBS | SYSTOLIC BLOOD PRESSURE: 124 MMHG | HEART RATE: 77 BPM | HEIGHT: 70 IN | DIASTOLIC BLOOD PRESSURE: 64 MMHG | OXYGEN SATURATION: 97 %

## 2024-09-23 DIAGNOSIS — M99.53 INTERVERTEBRAL DISC STENOSIS OF NEURAL CANAL OF LUMBAR REGION: ICD-10-CM

## 2024-09-23 DIAGNOSIS — G62.1 NEUROPATHY, ALCOHOLIC (MULTI): ICD-10-CM

## 2024-09-23 DIAGNOSIS — F10.10 ALCOHOL ABUSE: ICD-10-CM

## 2024-09-23 DIAGNOSIS — E87.6 HYPOKALEMIA: ICD-10-CM

## 2024-09-23 DIAGNOSIS — D64.9 ANEMIA, UNSPECIFIED TYPE: ICD-10-CM

## 2024-09-23 DIAGNOSIS — E87.1 HYPONATREMIA: ICD-10-CM

## 2024-09-23 DIAGNOSIS — I10 PRIMARY HYPERTENSION: Primary | ICD-10-CM

## 2024-09-23 PROBLEM — J01.10 ACUTE NON-RECURRENT FRONTAL SINUSITIS: Status: RESOLVED | Noted: 2023-02-09 | Resolved: 2024-09-23

## 2024-09-23 LAB
ANION GAP SERPL CALC-SCNC: 15 MMOL/L (ref 10–20)
BASOPHILS # BLD AUTO: 0.03 X10*3/UL (ref 0–0.1)
BASOPHILS NFR BLD AUTO: 0.6 %
BUN SERPL-MCNC: 8 MG/DL (ref 6–23)
CALCIUM SERPL-MCNC: 8.8 MG/DL (ref 8.6–10.3)
CHLORIDE SERPL-SCNC: 98 MMOL/L (ref 98–107)
CO2 SERPL-SCNC: 26 MMOL/L (ref 21–32)
CREAT SERPL-MCNC: 0.73 MG/DL (ref 0.5–1.3)
EGFRCR SERPLBLD CKD-EPI 2021: >90 ML/MIN/1.73M*2
EOSINOPHIL # BLD AUTO: 0.16 X10*3/UL (ref 0–0.7)
EOSINOPHIL NFR BLD AUTO: 3.3 %
ERYTHROCYTE [DISTWIDTH] IN BLOOD BY AUTOMATED COUNT: 15.5 % (ref 11.5–14.5)
GLUCOSE SERPL-MCNC: 90 MG/DL (ref 74–99)
HCT VFR BLD AUTO: 30 % (ref 41–52)
HGB BLD-MCNC: 9.5 G/DL (ref 13.5–17.5)
HGB RETIC QN: 33 PG (ref 28–38)
IMM GRANULOCYTES # BLD AUTO: 0.01 X10*3/UL (ref 0–0.7)
IMM GRANULOCYTES NFR BLD AUTO: 0.2 % (ref 0–0.9)
IMMATURE RETIC FRACTION: 20.5 %
IRON SATN MFR SERPL: 24 % (ref 25–45)
IRON SERPL-MCNC: 50 UG/DL (ref 35–150)
LYMPHOCYTES # BLD AUTO: 0.85 X10*3/UL (ref 1.2–4.8)
LYMPHOCYTES NFR BLD AUTO: 17.3 %
MCH RBC QN AUTO: 31.4 PG (ref 26–34)
MCHC RBC AUTO-ENTMCNC: 31.7 G/DL (ref 32–36)
MCV RBC AUTO: 99 FL (ref 80–100)
MONOCYTES # BLD AUTO: 0.56 X10*3/UL (ref 0.1–1)
MONOCYTES NFR BLD AUTO: 11.4 %
NEUTROPHILS # BLD AUTO: 3.29 X10*3/UL (ref 1.2–7.7)
NEUTROPHILS NFR BLD AUTO: 67.2 %
NRBC BLD-RTO: 0 /100 WBCS (ref 0–0)
PLATELET # BLD AUTO: 220 X10*3/UL (ref 150–450)
POTASSIUM SERPL-SCNC: 3.8 MMOL/L (ref 3.5–5.3)
RBC # BLD AUTO: 3.03 X10*6/UL (ref 4.5–5.9)
RETICS #: 0.11 X10*6/UL (ref 0.02–0.12)
RETICS/RBC NFR AUTO: 3.5 % (ref 0.5–2)
SODIUM SERPL-SCNC: 135 MMOL/L (ref 136–145)
TIBC SERPL-MCNC: 207 UG/DL (ref 240–445)
UIBC SERPL-MCNC: 157 UG/DL (ref 110–370)
VIT B12 SERPL-MCNC: 2025 PG/ML (ref 211–911)
WBC # BLD AUTO: 4.9 X10*3/UL (ref 4.4–11.3)

## 2024-09-23 PROCEDURE — 80048 BASIC METABOLIC PNL TOTAL CA: CPT

## 2024-09-23 PROCEDURE — 3008F BODY MASS INDEX DOCD: CPT | Performed by: INTERNAL MEDICINE

## 2024-09-23 PROCEDURE — 36415 COLL VENOUS BLD VENIPUNCTURE: CPT

## 2024-09-23 PROCEDURE — 3078F DIAST BP <80 MM HG: CPT | Performed by: INTERNAL MEDICINE

## 2024-09-23 PROCEDURE — 85025 COMPLETE CBC W/AUTO DIFF WBC: CPT

## 2024-09-23 PROCEDURE — 83550 IRON BINDING TEST: CPT

## 2024-09-23 PROCEDURE — 82607 VITAMIN B-12: CPT

## 2024-09-23 PROCEDURE — 99215 OFFICE O/P EST HI 40 MIN: CPT | Performed by: INTERNAL MEDICINE

## 2024-09-23 PROCEDURE — 83540 ASSAY OF IRON: CPT

## 2024-09-23 PROCEDURE — 3074F SYST BP LT 130 MM HG: CPT | Performed by: INTERNAL MEDICINE

## 2024-09-23 PROCEDURE — 85045 AUTOMATED RETICULOCYTE COUNT: CPT

## 2024-09-23 RX ORDER — SODIUM CHLORIDE 1 G/1
TABLET ORAL
Qty: 810 TABLET | Refills: 3 | Status: SHIPPED | OUTPATIENT
Start: 2024-09-23

## 2024-09-23 ASSESSMENT — ENCOUNTER SYMPTOMS
VOMITING: 0
COUGH: 0
UNEXPECTED WEIGHT CHANGE: 0
FATIGUE: 0
ARTHRALGIAS: 0
CHEST TIGHTNESS: 0
BACK PAIN: 0
WHEEZING: 0
SHORTNESS OF BREATH: 0
DIARRHEA: 0
ABDOMINAL PAIN: 0
PALPITATIONS: 0
BLOOD IN STOOL: 0
NAUSEA: 0

## 2024-09-23 ASSESSMENT — PATIENT HEALTH QUESTIONNAIRE - PHQ9
1. LITTLE INTEREST OR PLEASURE IN DOING THINGS: NOT AT ALL
SUM OF ALL RESPONSES TO PHQ9 QUESTIONS 1 AND 2: 0
2. FEELING DOWN, DEPRESSED OR HOPELESS: NOT AT ALL

## 2024-09-23 NOTE — ASSESSMENT & PLAN NOTE
-He has drank for many years but is adamant that he has had no drinks since May 12  -We talked about the risk for relapse and I have recommended that he start attending AA meetings

## 2024-09-23 NOTE — PROGRESS NOTES
Subjective   Patient ID: Tr Arnold is a 58 y.o. male who presents for Establish Care.  HPI  He is here today to get established and we reviewed his past medical history and also some recent consultations with his specialist.  He states that he has had problems with his insurance and was not always able to follow-up as instructed.  He has had a problem with alcoholism but he states his last day of drinking was on May 12, 2024.  He has not felt well and has had issues with anemia as well as electrolyte imbalance chronic pain involving his low back and legs.  He was seen by Dr. Hall because of his anemia and had an EGD of his stomach on July 8.  He is found evidence of portable hypertensive gastropathy and a biopsy was obtained.  He was advised to schedule a colonoscopy.  I will give him a stool guaiac today but also encouraged him to schedule that appointment.  He also has been seen by Dr. Francisco Tomlin because of significant electrolyte imbalance which was determined to be likely secondary to his alcohol ingestion.  He of course was advised to stop drinking alcohol.  I will have him get a set of electrolytes today before leaving and we will see him back to go over the results.  His biggest complaint is that of chronic bilateral lower leg pain and intermittent low back pain.  He had been going to [facility] pain clinic up until February of this year.  He understands that we cannot give him Norco due to multiple reasons including the high potential for addiction.  We suggested that we get him back into the pain clinic for reevaluation and hopefully get back on track for treatment.  He is agreeable.  He does have a complex past medical history and complex medical problems and I explained to him that we will try to address them as we go along and hopefully get him back on track to improve his overall health.  Review of Systems   Constitutional:  Negative for fatigue and unexpected weight change.    Respiratory:  Negative for cough, chest tightness, shortness of breath and wheezing.    Cardiovascular:  Negative for chest pain, palpitations and leg swelling.   Gastrointestinal:  Negative for abdominal pain, blood in stool, diarrhea, nausea and vomiting.   Musculoskeletal:  Negative for arthralgias and back pain.     Objective   Physical Exam  Vitals and nursing note reviewed.   Constitutional:       General: He is not in acute distress.     Appearance: Normal appearance.   HENT:      Head: Normocephalic and atraumatic.   Eyes:      Conjunctiva/sclera: Conjunctivae normal.   Cardiovascular:      Rate and Rhythm: Normal rate and regular rhythm.      Heart sounds: Normal heart sounds.   Pulmonary:      Effort: No respiratory distress.      Breath sounds: No wheezing.   Abdominal:      Palpations: Abdomen is soft.      Tenderness: There is no abdominal tenderness. There is no guarding.   Musculoskeletal:         General: No swelling. Normal range of motion.   Skin:     General: Skin is warm and dry.   Neurological:      General: No focal deficit present.      Mental Status: He is alert and oriented to person, place, and time.   Psychiatric:         Behavior: Behavior normal.       Assessment/Plan   Problem List Items Addressed This Visit             ICD-10-CM    Hypertension - Primary I10     -He states he has not been taking lisinopril for some time and today's blood pressure was acceptable  -We will keep him off the lisinopril for now and continue to monitor         Hyponatremia E87.1     -He has quit drinking as of May 12  -He states he is only taking 2 of his sodium tablets a day when he had been originally instructed to take 3 of them 3 times a day by Dr. Tomlin  -I will have him stop on the way out today to get a BMP and we will go from there         Relevant Medications    sodium chloride 1,000 mg tablet    Other Relevant Orders    Basic Metabolic Panel    Alcohol abuse F10.10     -He has drank for many  "years but is adamant that he has had no drinks since May 12  -We talked about the risk for relapse and I have recommended that he start attending AA meetings         Neuropathy, alcoholic (Multi) G62.1     -He states he had an allergy to the Lyrica  -I will try to get her back into the pain clinic for reevaluation         Relevant Orders    Referral to Pain Medicine    Disability Placard    Hypokalemia E87.6     -He is taking a significantly high dose of Lasix but states he is only taking \"over-the-counter \"potassium  -We will check his potassium level today         Anemia D64.9     -He has had a partial workup for his anemia including EGD by Dr. 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  Once the results are known I will contact you  Please also remember not to take another single drink of alcohol and I do strongly recommend you sign up for the AA meetings to help you stay on track  Please remember to complete the FOBT kit this week.  Put the kit in your bathroom so that when mother nature calls you can collect the specimen.  You would simply drop that whole packet off at our   I would like to get that before the end of the week and I will contact you with the results  The staff will also be helping you to get an appointment with the pain clinic  I will see you back in a few weeks " but again I will call you about the labs earlier  Wilma Vidal, DO

## 2024-09-23 NOTE — PATIENT INSTRUCTIONS
Before leaving today I will have you get blood drawn so that we can check your anemia and check your electrolytes like the sodium and potassium.  Once the results are known I will contact you  Please also remember not to take another single drink of alcohol and I do strongly recommend you sign up for the AA meetings to help you stay on track  Please remember to complete the FOBT kit this week.  Put the kit in your bathroom so that when mother nature calls you can collect the specimen.  You would simply drop that whole packet off at our   I would like to get that before the end of the week and I will contact you with the results  The staff will also be helping you to get an appointment with the pain clinic  I will see you back in a few weeks but again I will call you about the labs earlier

## 2024-09-23 NOTE — ASSESSMENT & PLAN NOTE
"-He is taking a significantly high dose of Lasix but states he is only taking \"over-the-counter \"potassium  -We will check his potassium level today  "

## 2024-09-23 NOTE — ASSESSMENT & PLAN NOTE
-He states he had an allergy to the Lyrica  -I will try to get her back into the pain clinic for reevaluation

## 2024-09-23 NOTE — ASSESSMENT & PLAN NOTE
-He has quit drinking as of May 12  -He states he is only taking 2 of his sodium tablets a day when he had been originally instructed to take 3 of them 3 times a day by Dr. Tomlin  -I will have him stop on the way out today to get a BMP and we will go from there

## 2024-09-23 NOTE — ASSESSMENT & PLAN NOTE
-He states he has not been taking lisinopril for some time and today's blood pressure was acceptable  -We will keep him off the lisinopril for now and continue to monitor

## 2024-09-23 NOTE — ASSESSMENT & PLAN NOTE
-He had an MRI of his lumbar spine back on August 5, 2022 which revealed a compression deformity at L4 causing an effective spinal stenosis

## 2024-09-24 ENCOUNTER — CLINICAL SUPPORT (OUTPATIENT)
Age: 59
End: 2024-09-24
Payer: COMMERCIAL

## 2024-09-24 ENCOUNTER — TELEPHONE (OUTPATIENT)
Age: 59
End: 2024-09-24

## 2024-09-24 DIAGNOSIS — Z12.11 ENCOUNTER FOR SCREENING FECAL OCCULT BLOOD TESTING: ICD-10-CM

## 2024-09-24 LAB — POC FECAL OCCULT BLOOD IMMUNOASSAY: NEGATIVE

## 2024-09-24 PROCEDURE — 82274 ASSAY TEST FOR BLOOD FECAL: CPT | Performed by: INTERNAL MEDICINE

## 2024-09-24 NOTE — TELEPHONE ENCOUNTER
If you go into his chart under lab he will see that his test results are back.  I know he is scheduled to come back to go over results.  Please tell him that I would like to suggest that shell sign up for [facility] because that would be a wonderful opportunity for him to see his results and also message.  Thank you

## 2024-09-26 ENCOUNTER — APPOINTMENT (OUTPATIENT)
Dept: NEPHROLOGY | Facility: CLINIC | Age: 59
End: 2024-09-26
Payer: MEDICAID

## 2024-10-16 ENCOUNTER — LAB (OUTPATIENT)
Facility: LAB | Age: 59
End: 2024-10-16
Payer: COMMERCIAL

## 2024-10-16 ENCOUNTER — APPOINTMENT (OUTPATIENT)
Age: 59
End: 2024-10-16
Payer: COMMERCIAL

## 2024-10-16 ENCOUNTER — TELEPHONE (OUTPATIENT)
Dept: GASTROENTEROLOGY | Facility: CLINIC | Age: 59
End: 2024-10-16

## 2024-10-16 VITALS
WEIGHT: 290 LBS | HEIGHT: 70 IN | BODY MASS INDEX: 41.52 KG/M2 | OXYGEN SATURATION: 100 % | HEART RATE: 82 BPM | DIASTOLIC BLOOD PRESSURE: 72 MMHG | SYSTOLIC BLOOD PRESSURE: 124 MMHG

## 2024-10-16 DIAGNOSIS — F41.9 ANXIETY: ICD-10-CM

## 2024-10-16 DIAGNOSIS — E87.6 HYPOKALEMIA: ICD-10-CM

## 2024-10-16 DIAGNOSIS — R79.0 LOW MAGNESIUM LEVEL: ICD-10-CM

## 2024-10-16 DIAGNOSIS — D64.9 ANEMIA, UNSPECIFIED TYPE: ICD-10-CM

## 2024-10-16 DIAGNOSIS — Z13.220 SCREENING, LIPID: ICD-10-CM

## 2024-10-16 DIAGNOSIS — E87.1 HYPONATREMIA: ICD-10-CM

## 2024-10-16 DIAGNOSIS — N52.9 ERECTILE DYSFUNCTION, UNSPECIFIED ERECTILE DYSFUNCTION TYPE: ICD-10-CM

## 2024-10-16 DIAGNOSIS — Z12.5 SCREENING FOR PROSTATE CANCER: Primary | ICD-10-CM

## 2024-10-16 DIAGNOSIS — M99.53 INTERVERTEBRAL DISC STENOSIS OF NEURAL CANAL OF LUMBAR REGION: ICD-10-CM

## 2024-10-16 DIAGNOSIS — I10 PRIMARY HYPERTENSION: ICD-10-CM

## 2024-10-16 LAB
HCT VFR BLD AUTO: 33.5 % (ref 41–52)
HGB BLD-MCNC: 10.6 G/DL (ref 13.5–17.5)
MAGNESIUM SERPL-MCNC: 1.49 MG/DL (ref 1.6–2.4)

## 2024-10-16 PROCEDURE — 83735 ASSAY OF MAGNESIUM: CPT

## 2024-10-16 PROCEDURE — 3078F DIAST BP <80 MM HG: CPT | Performed by: INTERNAL MEDICINE

## 2024-10-16 PROCEDURE — 85014 HEMATOCRIT: CPT

## 2024-10-16 PROCEDURE — 3074F SYST BP LT 130 MM HG: CPT | Performed by: INTERNAL MEDICINE

## 2024-10-16 PROCEDURE — 99214 OFFICE O/P EST MOD 30 MIN: CPT | Performed by: INTERNAL MEDICINE

## 2024-10-16 PROCEDURE — 3008F BODY MASS INDEX DOCD: CPT | Performed by: INTERNAL MEDICINE

## 2024-10-16 PROCEDURE — 85018 HEMOGLOBIN: CPT

## 2024-10-16 PROCEDURE — 36415 COLL VENOUS BLD VENIPUNCTURE: CPT

## 2024-10-16 NOTE — PROGRESS NOTES
Subjective   Patient ID: Tr Arnold is a 58 y.o. male who presents for Follow-up.  HPI  He is here today for follow-up.  He continues to be alcohol free since our last visit.  He states he has no desire to drink.  He is doing better.  His most recent laboratory test results showed his sodium to be almost within normal range and his potassium was within acceptable range.  He states he continues to take the sodium tablets and had been taking 2 until he saw his lab work and started taking 4.  These were prescribed by Dr. Tomlin while he was in the hospital.  I do want him to follow-up with Dr. Tomlin to help guide with the sodium prescription and hopefully by him having given up alcohol, he can get off the sodium entirely.  He has agreed to call that office to make a follow-up appointment.  We also discussed his anemia.  He has been taking his vitamin B12 and folic acid.  He is also been taking a daily iron supplement.  His B12 was more than within satisfactory range and I have advised that he reduce his B12 intake by 50%.  His anemia has slowly improved.  He did have an EGD while in the hospital and we we will ask Dr. Hall if a colonoscopy would be advised to complete his workup.  I told him I would get back with him as soon as I heard.  He did turn in an FOBT which came back negative at the time.  He states that he has not made an appoint with the pain clinic yet because his priority right now is to get his cataracts addressed.  He states that he will make the appointment later as he is trying to stagger his medical costs he also will be going on Medicare in February so we decided to see him back for a welcome to Medicare wellness visit in early spring.  He knows to call if he is not doing well.  Objective   Physical Exam  Vitals and nursing note reviewed.   Constitutional:       General: He is not in acute distress.     Appearance: Normal appearance.   HENT:      Head: Normocephalic and atraumatic.   Eyes:       Conjunctiva/sclera: Conjunctivae normal.   Cardiovascular:      Rate and Rhythm: Normal rate and regular rhythm.      Heart sounds: Normal heart sounds.   Pulmonary:      Effort: No respiratory distress.      Breath sounds: No wheezing.   Abdominal:      Palpations: Abdomen is soft.      Tenderness: There is no abdominal tenderness. There is no guarding.   Musculoskeletal:         General: No swelling. Normal range of motion.      Comments: Wearing ACE wraps   Skin:     General: Skin is warm and dry.   Neurological:      General: No focal deficit present.      Mental Status: He is alert and oriented to person, place, and time.   Psychiatric:         Behavior: Behavior normal.       Recent Results (from the past 4 weeks)   Basic metabolic panel    Collection Time: 09/23/24 10:55 AM   Result Value Ref Range    Glucose 90 74 - 99 mg/dL    Sodium 135 (L) 136 - 145 mmol/L    Potassium 3.8 3.5 - 5.3 mmol/L    Chloride 98 98 - 107 mmol/L    Bicarbonate 26 21 - 32 mmol/L    Anion Gap 15 10 - 20 mmol/L    Urea Nitrogen 8 6 - 23 mg/dL    Creatinine 0.73 0.50 - 1.30 mg/dL    eGFR >90 >60 mL/min/1.73m*2    Calcium 8.8 8.6 - 10.3 mg/dL   Vitamin B12    Collection Time: 09/23/24 10:55 AM   Result Value Ref Range    Vitamin B12 2,025 (H) 211 - 911 pg/mL   Iron and TIBC    Collection Time: 09/23/24 10:55 AM   Result Value Ref Range    Iron 50 35 - 150 ug/dL    UIBC 157 110 - 370 ug/dL    TIBC 207 (L) 240 - 445 ug/dL    % Saturation 24 (L) 25 - 45 %   Reticulocytes    Collection Time: 09/23/24 10:55 AM   Result Value Ref Range    Retic % 3.5 (H) 0.5 - 2.0 %    Retic Absolute 0.107 0.022 - 0.118 x10*6/uL    Reticulocyte Hemoglobin 33 28 - 38 pg    Immature Retic fraction 20.5 (H) <=16.0 %   CBC and Auto Differential    Collection Time: 09/23/24 10:55 AM   Result Value Ref Range    WBC 4.9 4.4 - 11.3 x10*3/uL    nRBC 0.0 0.0 - 0.0 /100 WBCs    RBC 3.03 (L) 4.50 - 5.90 x10*6/uL    Hemoglobin 9.5 (L) 13.5 - 17.5 g/dL    Hematocrit  30.0 (L) 41.0 - 52.0 %    MCV 99 80 - 100 fL    MCH 31.4 26.0 - 34.0 pg    MCHC 31.7 (L) 32.0 - 36.0 g/dL    RDW 15.5 (H) 11.5 - 14.5 %    Platelets 220 150 - 450 x10*3/uL    Neutrophils % 67.2 40.0 - 80.0 %    Immature Granulocytes %, Automated 0.2 0.0 - 0.9 %    Lymphocytes % 17.3 13.0 - 44.0 %    Monocytes % 11.4 2.0 - 10.0 %    Eosinophils % 3.3 0.0 - 6.0 %    Basophils % 0.6 0.0 - 2.0 %    Neutrophils Absolute 3.29 1.20 - 7.70 x10*3/uL    Immature Granulocytes Absolute, Automated 0.01 0.00 - 0.70 x10*3/uL    Lymphocytes Absolute 0.85 (L) 1.20 - 4.80 x10*3/uL    Monocytes Absolute 0.56 0.10 - 1.00 x10*3/uL    Eosinophils Absolute 0.16 0.00 - 0.70 x10*3/uL    Basophils Absolute 0.03 0.00 - 0.10 x10*3/uL   POCT Fecal Occult Immunoassay-ifob manually resulted    Collection Time: 09/24/24 10:06 AM   Result Value Ref Range    POC Fecal Occult Blood Immunoassay Negative Negative       Assessment/Plan   Problem List Items Addressed This Visit             ICD-10-CM    Anxiety F41.9     -Doing well at this time         Hypertension I10    Hyponatremia E87.1     -His sodium has almost normalized and I do believe that having given up the alcohol his electrolytes have improved  -He has agreed to make an appointment with Dr. Tomlin to discuss his sodium supplementation         Relevant Orders    Basic Metabolic Panel    Hypokalemia E87.6     -His potassium has normalized         Anemia D64.9     -His anemia is slowly improving  -He has had an EGD  -He is taking vitamin B12, folic acid, and iron supplementation  -I have put a message out to Dr. Hall to see if a colonoscopy is in order and have agreed to get back with Kolton as soon as I hear.  He knows to call if he does not hear from me within 1 week's time         Relevant Orders    CBC and Auto Differential    Intervertebral disc stenosis of neural canal of lumbar region M99.53     -He will call when he is ready for another event to the pain clinic         Screening for  prostate cancer - Primary Z12.5    Relevant Orders    Prostate Spec.Ag,Screen     Other Visit Diagnoses         Codes    Screening, lipid     Z13.220    Relevant Orders    Lipid Panel        Patient instructions  As we discussed we will have you get a magnesium level drawn this morning before leaving and I will contact you with the results  Please remember to call Dr. Tomlin's office right away to get a follow-up appointment so that he can help guide therapy with your sodium tablets  Please continue your wonderful work with refraining from alcohol  We talked about doing chair exercises while watching television and please try to do that every day.  We want you to build up to 30 minutes a day  Please call when you decide that you want a referral to the pain clinic  Please do not hesitate to reach out if you are having problems and otherwise we will see you back in approximately 6 months for a checkup as well as a welcome to Medicare wellness visit.  Please also remember that you will need lab work done just prior to that visit and it will be fasting because we are checking your cholesterol       Wilma Vidal, DO

## 2024-10-16 NOTE — PATIENT INSTRUCTIONS
As we discussed we will have you get a magnesium level drawn this morning before leaving and I will contact you with the results  Please remember to call Dr. Tomlin's office right away to get a follow-up appointment so that he can help guide therapy with your sodium tablets  Please continue your wonderful work with refraining from alcohol  We talked about doing chair exercises while watching television and please try to do that every day.  We want you to build up to 30 minutes a day  Please call when you decide that you want a referral to the pain clinic  Please do not hesitate to reach out if you are having problems and otherwise we will see you back in approximately 6 months for a checkup as well as a welcome to Medicare wellness visit.  Please also remember that you will need lab work done just prior to that visit and it will be fasting because we are checking your cholesterol

## 2024-10-16 NOTE — ASSESSMENT & PLAN NOTE
-His anemia is slowly improving  -He has had an EGD  -He is taking vitamin B12, folic acid, and iron supplementation  -I have put a message out to Dr. Hall to see if a colonoscopy is in order and have agreed to get back with Kolton as soon as I hear.  He knows to call if he does not hear from me within 1 week's time

## 2024-10-16 NOTE — ASSESSMENT & PLAN NOTE
-His sodium has almost normalized and I do believe that having given up the alcohol his electrolytes have improved  -He has agreed to make an appointment with Dr. Tomlin to discuss his sodium supplementation

## 2024-10-16 NOTE — TELEPHONE ENCOUNTER
Called patient to schedule colonoscopy. He did not schedule at this time. He is meeting with an eye surgeon to have cataract surgery and wants to get that done first and then also wants to hold off because he can get different insurance in February. He said he would call back then to schedule.    ----- Message from Eduard Hall sent at 10/16/2024  7:58 AM EDT -----  Bernarda, would you reach out to [facility] to schedule colonoscopy for anemia.  He can be done at the outpatient lab

## 2025-02-10 ENCOUNTER — APPOINTMENT (OUTPATIENT)
Dept: PAIN MEDICINE | Facility: CLINIC | Age: 60
End: 2025-02-10
Payer: COMMERCIAL

## 2025-02-27 ENCOUNTER — OFFICE VISIT (OUTPATIENT)
Dept: PAIN MEDICINE | Facility: CLINIC | Age: 60
End: 2025-02-27
Payer: MEDICARE

## 2025-02-27 VITALS
SYSTOLIC BLOOD PRESSURE: 151 MMHG | WEIGHT: 307 LBS | BODY MASS INDEX: 43.95 KG/M2 | HEART RATE: 80 BPM | DIASTOLIC BLOOD PRESSURE: 83 MMHG | RESPIRATION RATE: 16 BRPM

## 2025-02-27 DIAGNOSIS — M48.062 NEUROGENIC CLAUDICATION DUE TO LUMBAR SPINAL STENOSIS: ICD-10-CM

## 2025-02-27 DIAGNOSIS — G62.1 NEUROPATHY, ALCOHOLIC (MULTI): ICD-10-CM

## 2025-02-27 DIAGNOSIS — M99.53 INTERVERTEBRAL DISC STENOSIS OF NEURAL CANAL OF LUMBAR REGION: ICD-10-CM

## 2025-02-27 DIAGNOSIS — M54.16 LUMBAR RADICULOPATHY: Primary | ICD-10-CM

## 2025-02-27 PROCEDURE — 99214 OFFICE O/P EST MOD 30 MIN: CPT | Performed by: PHYSICIAN ASSISTANT

## 2025-02-27 RX ORDER — LIDOCAINE HYDROCHLORIDE 20 MG/ML
1 INJECTION, SOLUTION EPIDURAL; INFILTRATION; INTRACAUDAL; PERINEURAL ONCE
OUTPATIENT
Start: 2025-02-27 | End: 2025-02-27

## 2025-02-27 RX ORDER — SODIUM CHLORIDE 9 MG/ML
1 INJECTION, SOLUTION INTRAMUSCULAR; INTRAVENOUS; SUBCUTANEOUS ONCE
OUTPATIENT
Start: 2025-02-27 | End: 2025-02-27

## 2025-02-27 RX ORDER — DEXAMETHASONE SODIUM PHOSPHATE 10 MG/ML
10 INJECTION INTRAMUSCULAR; INTRAVENOUS ONCE
OUTPATIENT
Start: 2025-02-27 | End: 2025-02-27

## 2025-02-27 RX ORDER — LIDOCAINE HYDROCHLORIDE 20 MG/ML
6 INJECTION, SOLUTION EPIDURAL; INFILTRATION; INTRACAUDAL; PERINEURAL ONCE
OUTPATIENT
Start: 2025-02-27 | End: 2025-02-27

## 2025-02-27 ASSESSMENT — ENCOUNTER SYMPTOMS
HEMATOLOGIC/LYMPHATIC NEGATIVE: 1
EYES NEGATIVE: 1
CONSTITUTIONAL NEGATIVE: 1
CARDIOVASCULAR NEGATIVE: 1
ALLERGIC/IMMUNOLOGIC NEGATIVE: 1
ENDOCRINE NEGATIVE: 1
BACK PAIN: 1
WEAKNESS: 1
ARTHRALGIAS: 1
RESPIRATORY NEGATIVE: 1
PSYCHIATRIC NEGATIVE: 1
GASTROINTESTINAL NEGATIVE: 1
MYALGIAS: 1
NUMBNESS: 1

## 2025-02-27 ASSESSMENT — COLUMBIA-SUICIDE SEVERITY RATING SCALE - C-SSRS
1. IN THE PAST MONTH, HAVE YOU WISHED YOU WERE DEAD OR WISHED YOU COULD GO TO SLEEP AND NOT WAKE UP?: NO
6. HAVE YOU EVER DONE ANYTHING, STARTED TO DO ANYTHING, OR PREPARED TO DO ANYTHING TO END YOUR LIFE?: NO
2. HAVE YOU ACTUALLY HAD ANY THOUGHTS OF KILLING YOURSELF?: NO

## 2025-02-27 ASSESSMENT — PATIENT HEALTH QUESTIONNAIRE - PHQ9
SUM OF ALL RESPONSES TO PHQ9 QUESTIONS 1 AND 2: 0
2. FEELING DOWN, DEPRESSED OR HOPELESS: NOT AT ALL
1. LITTLE INTEREST OR PLEASURE IN DOING THINGS: NOT AT ALL

## 2025-02-27 NOTE — H&P (VIEW-ONLY)
Subjective   Patient ID: Tr Arnold is a 59 y.o. male who presents for Back Pain (ONGOING BACK PAIN FOR ABOUT A MONTH, SINCE HE HASN'T BEEN ABLE TO GET A PRESCRIPTION FOR NORCO WITH HIS NEW PRIMARY CARE HE NOTES HE HAD TROUBLE GETTING OUT OF BED SO HE HAS BEEN SLEEPING IN HIS RECLINER, HE HAS SPASMS TO THE LOWER BACK FROM LEFT TO RIGHT, LEGS STARTS TO SHAKE WHEN HE IS LAYING OR SITTING IN HIS RECLINER FOR A WHILE FEELS LIKE ELECTRIC SHOCK, ).HE HAS PAIN WITH TRANSITIONING, STANDING, WALKING, LAYING FLAT, HEAT DAILY, ICE PRN, MOTRIN, TYLENOL, HE AMBULATES WITH A SEATED WALKER, PAIN SCORE TODAY 5/10, PAIN WHEN LAYING 10/10, DEP NO, SMOKING NO, SOAPP=3, LAZARO=50%    Yanni Palma, [facility] 02/27/25 10:16 AM     Patient is a 59-year-old male. He presents today after a year hiatus.  Patient states that he has stopped drinking.  He has been working out.  He has lost some weight.  He is no longer on Norco as his new primary care physician would not give it to him and he actually states that he is happy about this.  He did not tolerate Lyrica due to swelling.  He is here today with complaints of lower back pain with bilateral radiating leg pain.  This affects his ambulatory status.  This affects his quality life.  This affects his activities and affects his ability to do things he wants to do.  Patient states that he now has to ambulate with her rollator and sleep in a recliner.  He cannot lay flat because of the pain.  He cannot walk, stand, be upright or active.  He rates his discomfort a 5-10/10 on the visual analog scale depending on what he is doing.    He has done therapy in the past that did not help.  He has taken anti-inflammatory medication that did not help.  He is here today to discuss his potential injection options as these have been discussed with him in the past that he did not want to do them but now he would be agreeable to.        Review of Systems   Constitutional: Negative.    HENT: Negative.      Eyes: Negative.    Respiratory: Negative.     Cardiovascular: Negative.    Gastrointestinal: Negative.    Endocrine: Negative.    Genitourinary: Negative.    Musculoskeletal:  Positive for arthralgias, back pain, gait problem and myalgias.   Skin: Negative.    Allergic/Immunologic: Negative.    Neurological:  Positive for weakness and numbness.   Hematological: Negative.    Psychiatric/Behavioral: Negative.         Objective   Physical Exam  Constitutional:       General: He is not in acute distress.     Appearance: Normal appearance. He is not ill-appearing.   HENT:      Head: Normocephalic.      Right Ear: External ear normal.      Left Ear: External ear normal.      Nose: Nose normal.      Mouth/Throat:      Pharynx: Oropharynx is clear.   Eyes:      Pupils: Pupils are equal, round, and reactive to light.   Cardiovascular:      Rate and Rhythm: Normal rate.   Pulmonary:      Effort: Pulmonary effort is normal.   Musculoskeletal:         General: Normal range of motion.      Right shoulder: Normal.      Left shoulder: Normal.      Right elbow: Normal.      Left elbow: Normal.      Right wrist: Normal.      Left wrist: Normal.      Cervical back: Normal, normal range of motion and neck supple.      Thoracic back: Normal.      Lumbar back: Normal.      Right hip: Normal.      Left hip: Normal.      Right knee: Normal.      Left knee: Normal.      Comments: 5/5 lower extremity strength  Ambulating with a cane    Skin:     General: Skin is warm and dry.   Neurological:      General: No focal deficit present.      Mental Status: He is alert and oriented to person, place, and time.   Psychiatric:         Mood and Affect: Mood normal.         Behavior: Behavior normal.         Thought Content: Thought content normal.         Judgment: Judgment normal.       XR lumbar spine 2-3 views  Status: Final result     PACS Images     Show images for XR lumbar spine 2-3 views  Signed by    Signed Time Phone Pager   Eliseo FLORES  MD Arley 11/28/2023 14:30 [phone]     Exam Information    Status Exam Begun Exam Ended   Final 11/28/2023 10:15 11/28/2023 10:36     Study Result    Narrative & Impression   Interpreted By:  Eliseo Tubbs,   STUDY:  XR LUMBAR SPINE 2-3 VIEWS; ;  11/28/2023 10:36 am      INDICATION:  Signs/Symptoms:M54.50.      COMPARISON:  07/25/2022      ACCESSION NUMBER(S):  [id number]      ORDERING CLINICIAN:  AIDEN HART      FINDINGS:  LUMBAR SPINE-AP, LATERAL AND L5-S1 SPOT LATERAL VIEWS  A slight levoscoliosis of the lumbar spine is present. The pedicles  and posterior elements are intact. A severe compression of the L4  vertebra is seen unchanged from 07/25/2022. Degenerative changes are  present at L2-3 with mild osteophyte formation. Degenerative disc  disease changes are also present at L3-4 and L4-5 levels      IMPRESSION:  Chronic severe compression fracture of L4 vertebra.  Mild multilevel degenerative disc disease changes. No acute change  from 07/25/2022.          MACRO:  None      Signed by: Eliseo Tubbs 11/28/2023 2:30 PM  Dictation workstation:   [id number]   MR lumbar spine wo IV contrast  Status: Final result     PACS Images     Show images for MR lumbar spine wo IV contrast  Signed by    Signed Time Phone Pager   Felipe Martini MD 8/05/2022 12:19 [phone]      Exam Information    Status Exam Begun Exam Ended   Final 8/05/2022 07:30 8/05/2022 08:25     Study Result    Narrative & Impression   MRN: [id number]  Patient Name: WESLEY PACE     STUDY:  MRI L-SPINE WO;  8/5/2022 8:25 am     INDICATION:  back pain, incidental mod to severe copression fracture on L4 NO TO  MRI QUESTIONS S32.040S: Compression fracture of L4 vertebra, sequela.     COMPARISON:  MRI lumbar spine 04/02/2021, lumbar spine radiographs 07/25/2022.     ACCESSION NUMBER(S):  [id number]     ORDERING CLINICIAN:  MARIO ALBERTO SHEIKH     TECHNIQUE:  Sagittal T1, T2, STIR, axial T1 and T2 weighted images of the  lumbar  spine were acquired.     FINDINGS:  Alignment: Lumbar lordosis is maintained.     Vertebrae/Intervertebral Discs: Similar to the radiographs from  07/25/2020, there is on L4 compression deformity with increased  T2/stir signal compatible with an acute-subacute fracture. There is  greater than 60% loss of disc height. There is moderate retropulsion  and resultant moderate canal stenosis. There is mild bulging of the  posterior longitudinal ligament without luly disruption. Disruption  of the superior margin L3-L4 intervertebral disc with increased  signal within the disc.     Conus: The lower thoracic cord appears unremarkable. The conus  terminates at L1.     T12-L1:  Small disc bulge slightly asymmetric to the left and facet  degenerative changes without canal stenosis. Neural foramina are  patent.     L1-2:  Circumferential disc bulge, facet degenerative changes without  canal stenosis. Neural foramina patent.     L2-3:  Disc bulge asymmetric to the left, facet degenerative changes  and mild ligamentum flavum hypertrophy with flattening of the ventral  thecal sac. No significant canal stenosis. Neural foramina are patent.     L3-4:  Disc bulge, ligamentum flavum hypertrophy and facet  degenerative changes with superimposed retropulsion from compression  deformity and resultant moderate canal stenosis. Mild bilateral  neural foraminal narrowing.     L4-5:  Disc bulge asymmetric to the right, facet degenerative changes  and ligamentum flavum hypertrophy with flattening of the ventral  thecal sac. No significant canal stenosis. Right subarticular recess  narrowing. Right greater than left mild bilateral neural foraminal  narrowing, with abutment of the exiting bilateral L4 nerve roots.     L5-S1:  Central and left paracentral disc bulge, facet degenerative  changes and ligamentum flavum hypertrophy without significant canal  stenosis. Mild bilateral neural foraminal narrowing.     Mildly increased T2/stir  signal at the paraspinal muscle at L3-L4  secondary to the compression deformity. Diffuse fatty replacement of  the paraspinal musculature otherwise.     Imaged sacrum and iliac wings are within normal limits.     IMPRESSION:  Moderate compression deformity L4 vertebral body with retropulsion  and resultant moderate canal stenosis, compatible with an  acute-subacute fracture. Bulging without luly destruction of the  posterior longitudinal ligament.  Moderate multilevel degenerative changes most prominent at L3-L4, as  described above, secondary to the compression deformity.     Orange Alert:  A critical result communication was sent to Dr. Velasco on  8/5/2022 using the Doc halo system.     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  Other orders  -     NPO Diet Except: Sips with meds; Effective now; Standing  -     Height and weight; Standing  -     Insert and maintain peripheral IV; Standing  -     Saline lock IV; Standing  -     Type And Screen; Standing  -     Inpatient consult to Respiratory Care; Standing  -     Adult diet Regular; Standing  -     Vital Signs; Standing  -     Notify physician - Standard Parameters; Standing  -     Continue IV fluids ordered pre-procedure; Standing  -     Prior to Discharge O2 Weaning; Standing  -     Pulse oximetry, continuous; Standing  -     Discharge patient; Standing  -     iohexol (OMNIPaque) 300 mg iodine/mL solution 6 mL  -     lidocaine PF (Xylocaine) 20 mg/mL (2 %) injection 120 mg  -     lidocaine PF (Xylocaine) 20 mg/mL (2 %) injection 20 mg  -     sodium chloride (PF) 0.9% solution 1 mL  -     dexAMETHasone (PF) (Decadron) injection 10 mg       Patient is a 59-year-old male with a past medical history significant  for the above following up today after a year hiatus.  He did not tolerate Lyrica due to swelling.  At this time, he is having lower back pain with bilateral radiating leg pain that is affecting his ambulatory status.  Affecting his quality of life and affecting his ability to do things he wants to do.  He cannot stand, walk, or sleep as he wants to because of the pain.  He has done therapy in the past that has not helped.  He has pursued all reasonable conservative treatments and they have not helped.  At this time, based on his imaging findings, his failure to improve with conservative treatments and the significant radicular symptoms he is still experiencing I recommended bilateral L4-5 transforaminal epidural steroid injection to be done under fluoroscopy for both diagnostic and therapeutic purposes.  Procedure was discussed.  Risk and benefits were discussed.  Patient is agreeable.  He will follow-up 2 weeks after the injection for reevaluation.  Call clinic sooner if necessary.  Medication holds for the injection including vitamins for 1 week was discussed.

## 2025-02-27 NOTE — PROGRESS NOTES
Subjective   Patient ID: Tr Arnold is a 59 y.o. male who presents for Back Pain (ONGOING BACK PAIN FOR ABOUT A MONTH, SINCE HE HASN'T BEEN ABLE TO GET A PRESCRIPTION FOR NORCO WITH HIS NEW PRIMARY CARE HE NOTES HE HAD TROUBLE GETTING OUT OF BED SO HE HAS BEEN SLEEPING IN HIS RECLINER, HE HAS SPASMS TO THE LOWER BACK FROM LEFT TO RIGHT, LEGS STARTS TO SHAKE WHEN HE IS LAYING OR SITTING IN HIS RECLINER FOR A WHILE FEELS LIKE ELECTRIC SHOCK, ).HE HAS PAIN WITH TRANSITIONING, STANDING, WALKING, LAYING FLAT, HEAT DAILY, ICE PRN, MOTRIN, TYLENOL, HE AMBULATES WITH A SEATED WALKER, PAIN SCORE TODAY 5/10, PAIN WHEN LAYING 10/10, DEP NO, SMOKING NO, SOAPP=3, LAZARO=50%    Yanni Palma, [facility] 02/27/25 10:16 AM     Patient is a 59-year-old male. He presents today after a year hiatus.  Patient states that he has stopped drinking.  He has been working out.  He has lost some weight.  He is no longer on Norco as his new primary care physician would not give it to him and he actually states that he is happy about this.  He did not tolerate Lyrica due to swelling.  He is here today with complaints of lower back pain with bilateral radiating leg pain.  This affects his ambulatory status.  This affects his quality life.  This affects his activities and affects his ability to do things he wants to do.  Patient states that he now has to ambulate with her rollator and sleep in a recliner.  He cannot lay flat because of the pain.  He cannot walk, stand, be upright or active.  He rates his discomfort a 5-10/10 on the visual analog scale depending on what he is doing.    He has done therapy in the past that did not help.  He has taken anti-inflammatory medication that did not help.  He is here today to discuss his potential injection options as these have been discussed with him in the past that he did not want to do them but now he would be agreeable to.        Review of Systems   Constitutional: Negative.    HENT: Negative.      Eyes: Negative.    Respiratory: Negative.     Cardiovascular: Negative.    Gastrointestinal: Negative.    Endocrine: Negative.    Genitourinary: Negative.    Musculoskeletal:  Positive for arthralgias, back pain, gait problem and myalgias.   Skin: Negative.    Allergic/Immunologic: Negative.    Neurological:  Positive for weakness and numbness.   Hematological: Negative.    Psychiatric/Behavioral: Negative.         Objective   Physical Exam  Constitutional:       General: He is not in acute distress.     Appearance: Normal appearance. He is not ill-appearing.   HENT:      Head: Normocephalic.      Right Ear: External ear normal.      Left Ear: External ear normal.      Nose: Nose normal.      Mouth/Throat:      Pharynx: Oropharynx is clear.   Eyes:      Pupils: Pupils are equal, round, and reactive to light.   Cardiovascular:      Rate and Rhythm: Normal rate.   Pulmonary:      Effort: Pulmonary effort is normal.   Musculoskeletal:         General: Normal range of motion.      Right shoulder: Normal.      Left shoulder: Normal.      Right elbow: Normal.      Left elbow: Normal.      Right wrist: Normal.      Left wrist: Normal.      Cervical back: Normal, normal range of motion and neck supple.      Thoracic back: Normal.      Lumbar back: Normal.      Right hip: Normal.      Left hip: Normal.      Right knee: Normal.      Left knee: Normal.      Comments: 5/5 lower extremity strength  Ambulating with a cane    Skin:     General: Skin is warm and dry.   Neurological:      General: No focal deficit present.      Mental Status: He is alert and oriented to person, place, and time.   Psychiatric:         Mood and Affect: Mood normal.         Behavior: Behavior normal.         Thought Content: Thought content normal.         Judgment: Judgment normal.       XR lumbar spine 2-3 views  Status: Final result     PACS Images     Show images for XR lumbar spine 2-3 views  Signed by    Signed Time Phone Pager   Eliseo FLORES  MD Arley 11/28/2023 14:30 [phone]     Exam Information    Status Exam Begun Exam Ended   Final 11/28/2023 10:15 11/28/2023 10:36     Study Result    Narrative & Impression   Interpreted By:  Eliseo Tubbs,   STUDY:  XR LUMBAR SPINE 2-3 VIEWS; ;  11/28/2023 10:36 am      INDICATION:  Signs/Symptoms:M54.50.      COMPARISON:  07/25/2022      ACCESSION NUMBER(S):  [id number]      ORDERING CLINICIAN:  AIDEN HART      FINDINGS:  LUMBAR SPINE-AP, LATERAL AND L5-S1 SPOT LATERAL VIEWS  A slight levoscoliosis of the lumbar spine is present. The pedicles  and posterior elements are intact. A severe compression of the L4  vertebra is seen unchanged from 07/25/2022. Degenerative changes are  present at L2-3 with mild osteophyte formation. Degenerative disc  disease changes are also present at L3-4 and L4-5 levels      IMPRESSION:  Chronic severe compression fracture of L4 vertebra.  Mild multilevel degenerative disc disease changes. No acute change  from 07/25/2022.          MACRO:  None      Signed by: Eliseo Tubbs 11/28/2023 2:30 PM  Dictation workstation:   [id number]   MR lumbar spine wo IV contrast  Status: Final result     PACS Images     Show images for MR lumbar spine wo IV contrast  Signed by    Signed Time Phone Pager   Felipe Martini MD 8/05/2022 12:19 [phone]      Exam Information    Status Exam Begun Exam Ended   Final 8/05/2022 07:30 8/05/2022 08:25     Study Result    Narrative & Impression   MRN: [id number]  Patient Name: WESLEY PACE     STUDY:  MRI L-SPINE WO;  8/5/2022 8:25 am     INDICATION:  back pain, incidental mod to severe copression fracture on L4 NO TO  MRI QUESTIONS S32.040S: Compression fracture of L4 vertebra, sequela.     COMPARISON:  MRI lumbar spine 04/02/2021, lumbar spine radiographs 07/25/2022.     ACCESSION NUMBER(S):  [id number]     ORDERING CLINICIAN:  MARIO ALBERTO SHEIKH     TECHNIQUE:  Sagittal T1, T2, STIR, axial T1 and T2 weighted images of the  lumbar  spine were acquired.     FINDINGS:  Alignment: Lumbar lordosis is maintained.     Vertebrae/Intervertebral Discs: Similar to the radiographs from  07/25/2020, there is on L4 compression deformity with increased  T2/stir signal compatible with an acute-subacute fracture. There is  greater than 60% loss of disc height. There is moderate retropulsion  and resultant moderate canal stenosis. There is mild bulging of the  posterior longitudinal ligament without luly disruption. Disruption  of the superior margin L3-L4 intervertebral disc with increased  signal within the disc.     Conus: The lower thoracic cord appears unremarkable. The conus  terminates at L1.     T12-L1:  Small disc bulge slightly asymmetric to the left and facet  degenerative changes without canal stenosis. Neural foramina are  patent.     L1-2:  Circumferential disc bulge, facet degenerative changes without  canal stenosis. Neural foramina patent.     L2-3:  Disc bulge asymmetric to the left, facet degenerative changes  and mild ligamentum flavum hypertrophy with flattening of the ventral  thecal sac. No significant canal stenosis. Neural foramina are patent.     L3-4:  Disc bulge, ligamentum flavum hypertrophy and facet  degenerative changes with superimposed retropulsion from compression  deformity and resultant moderate canal stenosis. Mild bilateral  neural foraminal narrowing.     L4-5:  Disc bulge asymmetric to the right, facet degenerative changes  and ligamentum flavum hypertrophy with flattening of the ventral  thecal sac. No significant canal stenosis. Right subarticular recess  narrowing. Right greater than left mild bilateral neural foraminal  narrowing, with abutment of the exiting bilateral L4 nerve roots.     L5-S1:  Central and left paracentral disc bulge, facet degenerative  changes and ligamentum flavum hypertrophy without significant canal  stenosis. Mild bilateral neural foraminal narrowing.     Mildly increased T2/stir  signal at the paraspinal muscle at L3-L4  secondary to the compression deformity. Diffuse fatty replacement of  the paraspinal musculature otherwise.     Imaged sacrum and iliac wings are within normal limits.     IMPRESSION:  Moderate compression deformity L4 vertebral body with retropulsion  and resultant moderate canal stenosis, compatible with an  acute-subacute fracture. Bulging without luly destruction of the  posterior longitudinal ligament.  Moderate multilevel degenerative changes most prominent at L3-L4, as  described above, secondary to the compression deformity.     Orange Alert:  A critical result communication was sent to Dr. Velasco on  8/5/2022 using the Doc halo system.     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  Other orders  -     NPO Diet Except: Sips with meds; Effective now; Standing  -     Height and weight; Standing  -     Insert and maintain peripheral IV; Standing  -     Saline lock IV; Standing  -     Type And Screen; Standing  -     Inpatient consult to Respiratory Care; Standing  -     Adult diet Regular; Standing  -     Vital Signs; Standing  -     Notify physician - Standard Parameters; Standing  -     Continue IV fluids ordered pre-procedure; Standing  -     Prior to Discharge O2 Weaning; Standing  -     Pulse oximetry, continuous; Standing  -     Discharge patient; Standing  -     iohexol (OMNIPaque) 300 mg iodine/mL solution 6 mL  -     lidocaine PF (Xylocaine) 20 mg/mL (2 %) injection 120 mg  -     lidocaine PF (Xylocaine) 20 mg/mL (2 %) injection 20 mg  -     sodium chloride (PF) 0.9% solution 1 mL  -     dexAMETHasone (PF) (Decadron) injection 10 mg       Patient is a 59-year-old male with a past medical history significant  for the above following up today after a year hiatus.  He did not tolerate Lyrica due to swelling.  At this time, he is having lower back pain with bilateral radiating leg pain that is affecting his ambulatory status.  Affecting his quality of life and affecting his ability to do things he wants to do.  He cannot stand, walk, or sleep as he wants to because of the pain.  He has done therapy in the past that has not helped.  He has pursued all reasonable conservative treatments and they have not helped.  At this time, based on his imaging findings, his failure to improve with conservative treatments and the significant radicular symptoms he is still experiencing I recommended bilateral L4-5 transforaminal epidural steroid injection to be done under fluoroscopy for both diagnostic and therapeutic purposes.  Procedure was discussed.  Risk and benefits were discussed.  Patient is agreeable.  He will follow-up 2 weeks after the injection for reevaluation.  Call clinic sooner if necessary.  Medication holds for the injection including vitamins for 1 week was discussed.

## 2025-02-28 ENCOUNTER — TELEPHONE (OUTPATIENT)
Dept: PAIN MEDICINE | Facility: CLINIC | Age: 60
End: 2025-02-28
Payer: COMMERCIAL

## 2025-03-12 ENCOUNTER — HOSPITAL ENCOUNTER (OUTPATIENT)
Dept: OPERATING ROOM | Facility: HOSPITAL | Age: 60
Setting detail: OUTPATIENT SURGERY
Discharge: HOME | End: 2025-03-12
Payer: MEDICARE

## 2025-03-12 VITALS
DIASTOLIC BLOOD PRESSURE: 75 MMHG | OXYGEN SATURATION: 97 % | RESPIRATION RATE: 16 BRPM | TEMPERATURE: 97.8 F | SYSTOLIC BLOOD PRESSURE: 157 MMHG | HEART RATE: 63 BPM

## 2025-03-12 DIAGNOSIS — M54.16 LUMBAR RADICULOPATHY: ICD-10-CM

## 2025-03-12 PROCEDURE — 2500000004 HC RX 250 GENERAL PHARMACY W/ HCPCS (ALT 636 FOR OP/ED): Performed by: STUDENT IN AN ORGANIZED HEALTH CARE EDUCATION/TRAINING PROGRAM

## 2025-03-12 PROCEDURE — 64483 NJX AA&/STRD TFRM EPI L/S 1: CPT | Mod: 50 | Performed by: STUDENT IN AN ORGANIZED HEALTH CARE EDUCATION/TRAINING PROGRAM

## 2025-03-12 PROCEDURE — 2550000001 HC RX 255 CONTRASTS: Performed by: STUDENT IN AN ORGANIZED HEALTH CARE EDUCATION/TRAINING PROGRAM

## 2025-03-12 RX ORDER — DEXAMETHASONE SODIUM PHOSPHATE 10 MG/ML
INJECTION INTRAMUSCULAR; INTRAVENOUS AS NEEDED
Status: DISCONTINUED | OUTPATIENT
Start: 2025-03-12 | End: 2025-03-13 | Stop reason: HOSPADM

## 2025-03-12 RX ORDER — LIDOCAINE HYDROCHLORIDE 5 MG/ML
INJECTION, SOLUTION INFILTRATION; INTRAVENOUS AS NEEDED
Status: DISCONTINUED | OUTPATIENT
Start: 2025-03-12 | End: 2025-03-13 | Stop reason: HOSPADM

## 2025-03-12 RX ORDER — LIDOCAINE HYDROCHLORIDE 20 MG/ML
INJECTION, SOLUTION EPIDURAL; INFILTRATION; INTRACAUDAL; PERINEURAL AS NEEDED
Status: DISCONTINUED | OUTPATIENT
Start: 2025-03-12 | End: 2025-03-13 | Stop reason: HOSPADM

## 2025-03-12 RX ADMIN — DEXAMETHASONE SODIUM PHOSPHATE 10 MG: 10 INJECTION, SOLUTION INTRAMUSCULAR; INTRAVENOUS at 09:25

## 2025-03-12 RX ADMIN — IOHEXOL 1 ML: 300 INJECTION, SOLUTION INTRAVENOUS at 09:25

## 2025-03-12 RX ADMIN — LIDOCAINE HYDROCHLORIDE 2 ML: 20 INJECTION, SOLUTION EPIDURAL; INFILTRATION; INTRACAUDAL; PERINEURAL at 09:24

## 2025-03-12 RX ADMIN — LIDOCAINE HYDROCHLORIDE 2 ML: 5 INJECTION, SOLUTION INFILTRATION at 09:25

## 2025-03-12 ASSESSMENT — PAIN SCALES - GENERAL
PAINLEVEL_OUTOF10: 2
PAINLEVEL_OUTOF10: 5 - MODERATE PAIN

## 2025-03-12 NOTE — PERIOPERATIVE NURSING NOTE
Discharge instructions reviewed by Alaina SARAH RN no questions and verbalized understanding.   discharged to exit via wheel chair to be driven home by tatianna Rizo

## 2025-04-03 ENCOUNTER — OFFICE VISIT (OUTPATIENT)
Dept: PAIN MEDICINE | Facility: CLINIC | Age: 60
End: 2025-04-03
Payer: MEDICARE

## 2025-04-03 VITALS
WEIGHT: 304 LBS | HEIGHT: 70 IN | RESPIRATION RATE: 16 BRPM | DIASTOLIC BLOOD PRESSURE: 76 MMHG | BODY MASS INDEX: 43.52 KG/M2 | HEART RATE: 76 BPM | SYSTOLIC BLOOD PRESSURE: 156 MMHG

## 2025-04-03 DIAGNOSIS — M54.16 LUMBAR RADICULOPATHY: Primary | ICD-10-CM

## 2025-04-03 DIAGNOSIS — M48.062 NEUROGENIC CLAUDICATION DUE TO LUMBAR SPINAL STENOSIS: ICD-10-CM

## 2025-04-03 DIAGNOSIS — M99.53 INTERVERTEBRAL DISC STENOSIS OF NEURAL CANAL OF LUMBAR REGION: ICD-10-CM

## 2025-04-03 PROCEDURE — 99213 OFFICE O/P EST LOW 20 MIN: CPT | Performed by: PHYSICIAN ASSISTANT

## 2025-04-03 ASSESSMENT — ENCOUNTER SYMPTOMS
RESPIRATORY NEGATIVE: 1
HEMATOLOGIC/LYMPHATIC NEGATIVE: 1
CARDIOVASCULAR NEGATIVE: 1
ALLERGIC/IMMUNOLOGIC NEGATIVE: 1
CONSTITUTIONAL NEGATIVE: 1
BACK PAIN: 1
ARTHRALGIAS: 1
WEAKNESS: 1
MYALGIAS: 1
EYES NEGATIVE: 1
PSYCHIATRIC NEGATIVE: 1
GASTROINTESTINAL NEGATIVE: 1
ENDOCRINE NEGATIVE: 1

## 2025-04-03 ASSESSMENT — COLUMBIA-SUICIDE SEVERITY RATING SCALE - C-SSRS
6. HAVE YOU EVER DONE ANYTHING, STARTED TO DO ANYTHING, OR PREPARED TO DO ANYTHING TO END YOUR LIFE?: NO
2. HAVE YOU ACTUALLY HAD ANY THOUGHTS OF KILLING YOURSELF?: NO
1. IN THE PAST MONTH, HAVE YOU WISHED YOU WERE DEAD OR WISHED YOU COULD GO TO SLEEP AND NOT WAKE UP?: NO

## 2025-04-03 NOTE — PROGRESS NOTES
"Subjective   Patient ID: Tr Arnold is a 59 y.o. male who presents for Follow-up (FUV- fu visit PAT L4/5 TFESI done on 3/12/25. He states that he is 75-80% better. He still has PAT leg pain. He describes the pain as a dull ache. He stated that he is only getting the\"shocking\" feel once in a blue moon. He does not need anything at this time for pain relief. He is going to gym 2-3 times a week to build up his muscles. ). Patient states that he has a little pain when standing for long periods of time. He states that will sit and rest.  Patient presents with a rollator. Pain score 2/10 now 7-8/10 after standing. ETOH-neg LAZARO- 50%    PARVIN BOOTHE, LORRIE 04/03/25 9:35 AM     Patient is a 59-year-old male following up today after undergoing bilateral L4-5 transforaminal epidural steroid injection.  This was done on 3/12/2025 and at this time has given him 75 to 80% relief.  He still has some leg aching and some back aching but at this time, he feels that is just from being deconditioned.  He recently started working out again.  He is going to the gym.  He is hopeful that he is going to build some muscle tone and strength in his back and legs and core.  He wants to work out and just see how he does.  At this time, he is pleased with the relief he has gotten and he feels like things are overall not perfect but going better.        Review of Systems   Constitutional: Negative.    HENT: Negative.     Eyes: Negative.    Respiratory: Negative.     Cardiovascular: Negative.    Gastrointestinal: Negative.    Endocrine: Negative.    Genitourinary: Negative.    Musculoskeletal:  Positive for arthralgias, back pain and myalgias.   Skin: Negative.    Allergic/Immunologic: Negative.    Neurological:  Positive for weakness.   Hematological: Negative.    Psychiatric/Behavioral: Negative.         Objective   Physical Exam  Constitutional:       General: He is not in acute distress.     Appearance: Normal appearance. He is " not ill-appearing.   HENT:      Head: Normocephalic.      Right Ear: External ear normal.      Left Ear: External ear normal.      Nose: Nose normal.      Mouth/Throat:      Pharynx: Oropharynx is clear.   Eyes:      Pupils: Pupils are equal, round, and reactive to light.   Cardiovascular:      Rate and Rhythm: Normal rate.   Pulmonary:      Effort: Pulmonary effort is normal.   Musculoskeletal:         General: Normal range of motion.      Right shoulder: Normal.      Left shoulder: Normal.      Right elbow: Normal.      Left elbow: Normal.      Right wrist: Normal.      Left wrist: Normal.      Cervical back: Normal, normal range of motion and neck supple.      Thoracic back: Normal.      Lumbar back: Normal.      Right hip: Normal.      Left hip: Normal.      Right knee: Normal.      Left knee: Normal.      Comments: 5/5 strength  Ambulating with a rollator   Skin:     General: Skin is warm and dry.   Neurological:      General: No focal deficit present.      Mental Status: He is alert and oriented to person, place, and time.   Psychiatric:         Mood and Affect: Mood normal.         Behavior: Behavior normal.         Thought Content: Thought content normal.         Judgment: Judgment normal.         MR lumbar spine wo IV contrast  Status: Final result     PACS Images     Show images for MR lumbar spine wo IV contrast  Signed by    Signed Time Phone Pager   Felipe Martini MD 8/05/2022 12:19 [phone]      Exam Information    Status Exam Begun Exam Ended   Final 8/05/2022 07:30 8/05/2022 08:25     Study Result    Narrative & Impression   MRN: [id number]  Patient Name: WESLEY PACE     STUDY:  MRI L-SPINE WO;  8/5/2022 8:25 am     INDICATION:  back pain, incidental mod to severe copression fracture on L4 NO TO  MRI QUESTIONS S32.040S: Compression fracture of L4 vertebra, sequela.     COMPARISON:  MRI lumbar spine 04/02/2021, lumbar spine radiographs 07/25/2022.     ACCESSION NUMBER(S):  [id number]      ORDERING CLINICIAN:  MARIO ALBERTO SHEIKH     TECHNIQUE:  Sagittal T1, T2, STIR, axial T1 and T2 weighted images of the lumbar  spine were acquired.     FINDINGS:  Alignment: Lumbar lordosis is maintained.     Vertebrae/Intervertebral Discs: Similar to the radiographs from  07/25/2020, there is on L4 compression deformity with increased  T2/stir signal compatible with an acute-subacute fracture. There is  greater than 60% loss of disc height. There is moderate retropulsion  and resultant moderate canal stenosis. There is mild bulging of the  posterior longitudinal ligament without luly disruption. Disruption  of the superior margin L3-L4 intervertebral disc with increased  signal within the disc.     Conus: The lower thoracic cord appears unremarkable. The conus  terminates at L1.     T12-L1:  Small disc bulge slightly asymmetric to the left and facet  degenerative changes without canal stenosis. Neural foramina are  patent.     L1-2:  Circumferential disc bulge, facet degenerative changes without  canal stenosis. Neural foramina patent.     L2-3:  Disc bulge asymmetric to the left, facet degenerative changes  and mild ligamentum flavum hypertrophy with flattening of the ventral  thecal sac. No significant canal stenosis. Neural foramina are patent.     L3-4:  Disc bulge, ligamentum flavum hypertrophy and facet  degenerative changes with superimposed retropulsion from compression  deformity and resultant moderate canal stenosis. Mild bilateral  neural foraminal narrowing.     L4-5:  Disc bulge asymmetric to the right, facet degenerative changes  and ligamentum flavum hypertrophy with flattening of the ventral  thecal sac. No significant canal stenosis. Right subarticular recess  narrowing. Right greater than left mild bilateral neural foraminal  narrowing, with abutment of the exiting bilateral L4 nerve roots.     L5-S1:  Central and left paracentral disc bulge, facet degenerative  changes and ligamentum flavum  "hypertrophy without significant canal  stenosis. Mild bilateral neural foraminal narrowing.     Mildly increased T2/stir signal at the paraspinal muscle at L3-L4  secondary to the compression deformity. Diffuse fatty replacement of  the paraspinal musculature otherwise.     Imaged sacrum and iliac wings are within normal limits.     IMPRESSION:  Moderate compression deformity L4 vertebral body with retropulsion  and resultant moderate canal stenosis, compatible with an  acute-subacute fracture. Bulging without luly destruction of the  posterior longitudinal ligament.  Moderate multilevel degenerative changes most prominent at L3-L4, as  described above, secondary to the compression deformity.     Orange Alert:  A critical result communication was sent to Dr. Velasco on  8/5/2022 using the Doc halo system.     XR lumbar spine 4+ views w flexion extension  Order: [id number]  Impression    FINDINGS/  Evaluation is limited by body habitus and osseous demineralization.  Age indeterminate compression deformity at L4 with no obvious fragment retropulsion; acute compression fracture not excluded.  No listhesis.  Multilevel disc height loss.  Vertebral stature is otherwise maintained.  Multilevel endplate osteophytes.  Lower lumbar spine facet arthropathy. No acute soft tissue abnormality.          Workstation ID:   [id number]  Narrative    EXAMINATION:  XR LUMBAR SPINE STANDARD WITH FLEX/EXT 4+ VIEWS  1/26/2023 10:38 am    HISTORY:  ORDERING SYSTEM PROVIDED HISTORY:  eval stability,     TECHNOLOGIST PROVIDED HISTORY:   Illness/Other  Reason for exam: eval stability  Cancer History: u  Surgery, RadiationHistory: u  Encounter Type: Subsequent/Follow-up  Additional signs and symptoms: PER PT \"HX L4 FX\"    ORDERING SYSTEM PROVIDED DIAGNOSIS CODES:  S32.041A Closed stable burst fracture of fourth lumbar vertebra, initial encounter (HCC)    Assessment/Plan   Diagnoses and all orders for this visit:  Lumbar radiculopathy  Neurogenic " claudication due to lumbar spinal stenosis  Intervertebral disc stenosis of neural canal of lumbar region       Patient is a 59-year-old male with the above-mentioned medical diagnoses following up today after undergoing a bilateral L4-5 transforaminal epidural steroid injection.  This was done on 3/12/2025 and gave him significant relief.  At this time, he states that things not perfect with they are better.  He is feeling well.  He is doing well.  He is comfort.  He is happy.  He overall feels like things are going very well.  They are not perfect but they are improved and at this time, he does not feel that he wants to do anything differently.  He is working out at the gym and he is able to build some muscle tone.  He is going to follow-up with our services as needed per his request.

## 2025-04-16 ENCOUNTER — APPOINTMENT (OUTPATIENT)
Age: 60
End: 2025-04-16
Payer: COMMERCIAL

## 2025-04-22 LAB
ANION GAP SERPL CALCULATED.4IONS-SCNC: 14 MMOL/L (CALC) (ref 7–17)
BASOPHILS # BLD AUTO: 31 CELLS/UL (ref 0–200)
BASOPHILS NFR BLD AUTO: 0.8 %
BUN SERPL-MCNC: 7 MG/DL (ref 7–25)
BUN/CREAT SERPL: ABNORMAL (CALC) (ref 6–22)
CALCIUM SERPL-MCNC: 9.5 MG/DL (ref 8.6–10.3)
CHLORIDE SERPL-SCNC: 96 MMOL/L (ref 98–110)
CHOLEST SERPL-MCNC: 154 MG/DL
CHOLEST/HDLC SERPL: 2.6 (CALC)
CO2 SERPL-SCNC: 24 MMOL/L (ref 20–32)
CREAT SERPL-MCNC: 0.75 MG/DL (ref 0.7–1.35)
EGFRCR SERPLBLD CKD-EPI 2021: 103 ML/MIN/1.73M2
EOSINOPHIL # BLD AUTO: 129 CELLS/UL (ref 15–500)
EOSINOPHIL NFR BLD AUTO: 3.3 %
ERYTHROCYTE [DISTWIDTH] IN BLOOD BY AUTOMATED COUNT: 16.7 % (ref 11–15)
GLUCOSE SERPL-MCNC: 91 MG/DL (ref 65–99)
HCT VFR BLD AUTO: 39.7 % (ref 38.5–50)
HDLC SERPL-MCNC: 60 MG/DL
HGB BLD-MCNC: 13.1 G/DL (ref 13.2–17.1)
LDLC SERPL CALC-MCNC: 80 MG/DL (CALC)
LYMPHOCYTES # BLD AUTO: 827 CELLS/UL (ref 850–3900)
LYMPHOCYTES NFR BLD AUTO: 21.2 %
MCH RBC QN AUTO: 29.9 PG (ref 27–33)
MCHC RBC AUTO-ENTMCNC: 33 G/DL (ref 32–36)
MCV RBC AUTO: 90.6 FL (ref 80–100)
MONOCYTES # BLD AUTO: 429 CELLS/UL (ref 200–950)
MONOCYTES NFR BLD AUTO: 11 %
NEUTROPHILS # BLD AUTO: 2484 CELLS/UL (ref 1500–7800)
NEUTROPHILS NFR BLD AUTO: 63.7 %
NONHDLC SERPL-MCNC: 94 MG/DL (CALC)
PLATELET # BLD AUTO: 125 THOUSAND/UL (ref 140–400)
PMV BLD REES-ECKER: 11.7 FL (ref 7.5–12.5)
POTASSIUM SERPL-SCNC: 4.2 MMOL/L (ref 3.5–5.3)
PSA SERPL-MCNC: 0.15 NG/ML
RBC # BLD AUTO: 4.38 MILLION/UL (ref 4.2–5.8)
SODIUM SERPL-SCNC: 134 MMOL/L (ref 135–146)
TRIGL SERPL-MCNC: 68 MG/DL
WBC # BLD AUTO: 3.9 THOUSAND/UL (ref 3.8–10.8)

## 2025-04-23 ENCOUNTER — APPOINTMENT (OUTPATIENT)
Age: 60
End: 2025-04-23
Payer: MEDICARE

## 2025-04-23 VITALS
BODY MASS INDEX: 43.15 KG/M2 | WEIGHT: 301.4 LBS | DIASTOLIC BLOOD PRESSURE: 78 MMHG | SYSTOLIC BLOOD PRESSURE: 138 MMHG | HEIGHT: 70 IN | OXYGEN SATURATION: 96 % | HEART RATE: 86 BPM

## 2025-04-23 DIAGNOSIS — E87.1 HYPONATREMIA: ICD-10-CM

## 2025-04-23 DIAGNOSIS — Z00.00 WELCOME TO MEDICARE PREVENTIVE VISIT: ICD-10-CM

## 2025-04-23 DIAGNOSIS — F10.10 ALCOHOL ABUSE: ICD-10-CM

## 2025-04-23 DIAGNOSIS — M99.53 INTERVERTEBRAL DISC STENOSIS OF NEURAL CANAL OF LUMBAR REGION: ICD-10-CM

## 2025-04-23 DIAGNOSIS — F32.0 CURRENT MILD EPISODE OF MAJOR DEPRESSIVE DISORDER WITHOUT PRIOR EPISODE (CMS-HCC): Primary | ICD-10-CM

## 2025-04-23 DIAGNOSIS — E55.9 VITAMIN D DEFICIENCY: ICD-10-CM

## 2025-04-23 DIAGNOSIS — D69.6 LOW PLATELET COUNT (CMS-HCC): ICD-10-CM

## 2025-04-23 DIAGNOSIS — I10 PRIMARY HYPERTENSION: ICD-10-CM

## 2025-04-23 DIAGNOSIS — K76.6 PORTAL HYPERTENSION (MULTI): ICD-10-CM

## 2025-04-23 PROBLEM — E87.6 HYPOKALEMIA: Status: RESOLVED | Noted: 2024-06-14 | Resolved: 2025-04-23

## 2025-04-23 PROCEDURE — 3008F BODY MASS INDEX DOCD: CPT | Performed by: INTERNAL MEDICINE

## 2025-04-23 PROCEDURE — 3078F DIAST BP <80 MM HG: CPT | Performed by: INTERNAL MEDICINE

## 2025-04-23 PROCEDURE — 99214 OFFICE O/P EST MOD 30 MIN: CPT | Performed by: INTERNAL MEDICINE

## 2025-04-23 PROCEDURE — G0402 INITIAL PREVENTIVE EXAM: HCPCS | Performed by: INTERNAL MEDICINE

## 2025-04-23 PROCEDURE — 1124F ACP DISCUSS-NO DSCNMKR DOCD: CPT | Performed by: INTERNAL MEDICINE

## 2025-04-23 PROCEDURE — 3075F SYST BP GE 130 - 139MM HG: CPT | Performed by: INTERNAL MEDICINE

## 2025-04-23 PROCEDURE — G2211 COMPLEX E/M VISIT ADD ON: HCPCS | Performed by: INTERNAL MEDICINE

## 2025-04-23 RX ORDER — FUROSEMIDE 40 MG/1
40 TABLET ORAL 3 TIMES DAILY
Qty: 270 TABLET | Refills: 3 | Status: SHIPPED | OUTPATIENT
Start: 2025-04-23 | End: 2026-04-23

## 2025-04-23 RX ORDER — CHOLECALCIFEROL (VITD3)/VIT K2 137.5-2
2 TABLET ORAL 2 TIMES DAILY
Qty: 200 TABLET | Refills: 1 | Status: SHIPPED | OUTPATIENT
Start: 2025-04-23

## 2025-04-23 RX ORDER — BUPROPION HYDROCHLORIDE 150 MG/1
150 TABLET ORAL EVERY MORNING
Qty: 30 TABLET | Refills: 1 | Status: SHIPPED | OUTPATIENT
Start: 2025-04-23 | End: 2025-06-22

## 2025-04-23 ASSESSMENT — ACTIVITIES OF DAILY LIVING (ADL)
MANAGING_FINANCES: INDEPENDENT
BATHING: INDEPENDENT
DRESSING: INDEPENDENT
DOING_HOUSEWORK: INDEPENDENT
GROCERY_SHOPPING: INDEPENDENT
TAKING_MEDICATION: INDEPENDENT

## 2025-04-23 ASSESSMENT — ENCOUNTER SYMPTOMS
ABDOMINAL PAIN: 0
PALPITATIONS: 0
CHEST TIGHTNESS: 0
BACK PAIN: 1
SHORTNESS OF BREATH: 0
VOMITING: 0
FATIGUE: 1
WHEEZING: 0
DIARRHEA: 0
BLOOD IN STOOL: 0
NAUSEA: 0

## 2025-04-23 ASSESSMENT — PATIENT HEALTH QUESTIONNAIRE - PHQ9
1. LITTLE INTEREST OR PLEASURE IN DOING THINGS: SEVERAL DAYS
2. FEELING DOWN, DEPRESSED OR HOPELESS: SEVERAL DAYS
10. IF YOU CHECKED OFF ANY PROBLEMS, HOW DIFFICULT HAVE THESE PROBLEMS MADE IT FOR YOU TO DO YOUR WORK, TAKE CARE OF THINGS AT HOME, OR GET ALONG WITH OTHER PEOPLE: SOMEWHAT DIFFICULT
SUM OF ALL RESPONSES TO PHQ9 QUESTIONS 1 AND 2: 2

## 2025-04-23 NOTE — ASSESSMENT & PLAN NOTE
- We will start Wellbutrin 150 mg daily to hopefully help with depression and also help with tobacco cessation.  I will see him back in 4 weeks    Orders:    buPROPion XL (Wellbutrin XL) 150 mg 24 hr tablet; Take 1 tablet (150 mg) by mouth once daily in the morning. Do not crush, chew, or split.

## 2025-04-23 NOTE — ASSESSMENT & PLAN NOTE
- We discussed fall prevention and then giving him a handout  -We will do a cursory eye examination-  -he is starting to exercise and we talked about eating a healthy diet  -I am giving him a handout on advanced directives and we talked about the importance of getting these completed

## 2025-04-23 NOTE — ASSESSMENT & PLAN NOTE
Orders:    vitamin D3-vitamin K2 (DosoKap) 137.5-200 mcg tablet; Take 2 Units by mouth 2 times a day. Pt takes 1 tab 2x/day    Vitamin D 25-Hydroxy,Total (for eval of Vitamin D levels); Future

## 2025-04-23 NOTE — ASSESSMENT & PLAN NOTE
- He has lost several pounds over the past year and we can continue to encourage him to keep up the great work

## 2025-04-23 NOTE — PROGRESS NOTES
Subjective   Reason for Visit: Tr Arnold is an 59 y.o. male here for a Medicare Wellness visit.     Past Medical, Surgical, and Family History reviewed and updated in chart.    Reviewed all medications by prescribing practitioner or clinical pharmacist (such as prescriptions, OTCs, herbal therapies and supplements) and documented in the medical record.    HPI  He is here today for his routine checkup and he also recently enrolled in Medicare in February.  Today we did his welcome to Medicare wellness visit.  He states he has been feeling a little bit down recently because his uncle passed away and also his son is getting out of [facility] and going to a [facility] house.  He states he worries about his son and he has been feeling down about it.  He also continues to use chewing tobacco.  He denies any history of seizure disorder I told him he might benefit from taking Wellbutrin because it had helped people quit tobacco as well as help with depression.  He is agreeable and we will start the medication.  We will see him back in approximately months for follow-up.  We also discussed falls and he has had no falls in the last year.  We talked about fall prevention and I will be giving him a handout that goes over ways to reduce his risk of falls in the future.  We agreed that his memory is relatively good as he is highly dependent doing everything for himself including managing his own finances and his own medications.  He also has hearing aids but he does not wear them because they are not effective.  He tries to eat a healthy diet and he has lost weight over the years.  We also discussed exercise and he states he received an injection in his back which was very helpful.  He has joined a gym as of a month ago and he is trying to do weight training and a little bit of aerobics.  We also went over the results of recent lab work.  His PSA was nice and low and we will check this once a year.  We have checked his  "testosterone levels in the past and they have been within normal range.  He also had recent cataract surgery and he has had a great result.  We also reviewed his cholesterol profile which came back excellent.  His sodium level is borderline.  He does have to take high-dose calcium and vitamin D as well as magnesium.  He has been referred to Dr. Tomlin for management but could not see him recently.  I will be ordering a vitamin D level as a follow-up.  Also his platelet count was slightly low which may be factitious.  I will repeat another CBC in 1 month when he comes back.  I will try to summarize everything in a problem based format and I will also give him specific instructions.  Patient Care Team:  Wilma Vidal DO as PCP - General (Internal Medicine)  Wilma Vidal DO as PCP - [employer] PCP     Review of Systems   Constitutional:  Positive for fatigue.   HENT:  Positive for congestion.    Respiratory:  Negative for chest tightness, shortness of breath and wheezing.         Occ cough from  seasonal allergies   Cardiovascular:  Negative for chest pain, palpitations and leg swelling.   Gastrointestinal:  Negative for abdominal pain, blood in stool, diarrhea, nausea and vomiting.   Musculoskeletal:  Positive for back pain.       Objective   Vitals:  /78   Pulse 86   Ht 1.778 m (5' 10\")   Wt 137 kg (301 lb 6.4 oz)   SpO2 96%   BMI 43.25 kg/m²       Physical Exam  Vitals and nursing note reviewed.   Constitutional:       General: He is not in acute distress.     Appearance: Normal appearance.   HENT:      Head: Normocephalic and atraumatic.   Eyes:      Conjunctiva/sclera: Conjunctivae normal.   Cardiovascular:      Rate and Rhythm: Normal rate and regular rhythm.      Heart sounds: Normal heart sounds.   Pulmonary:      Effort: No respiratory distress.      Breath sounds: No wheezing.   Abdominal:      Palpations: Abdomen is soft.      Tenderness: There is no abdominal tenderness. " There is no guarding.   Musculoskeletal:         General: No swelling. Normal range of motion.   Skin:     General: Skin is warm and dry.   Neurological:      General: No focal deficit present.      Mental Status: He is alert and oriented to person, place, and time.   Psychiatric:         Behavior: Behavior normal.       Recent Results (from the past 4 weeks)   Lipid Panel    Collection Time: 04/21/25  8:46 AM   Result Value Ref Range    CHOLESTEROL, TOTAL 154 <200 mg/dL    HDL CHOLESTEROL 60 > OR = 40 mg/dL    TRIGLYCERIDES 68 <150 mg/dL    LDL-CHOLESTEROL 80 mg/dL (calc)    CHOL/HDLC RATIO 2.6 <5.0 (calc)    NON HDL CHOLESTEROL 94 <130 mg/dL (calc)   Basic Metabolic Panel    Collection Time: 04/21/25  8:46 AM   Result Value Ref Range    GLUCOSE 91 65 - 99 mg/dL    UREA NITROGEN (BUN) 7 7 - 25 mg/dL    CREATININE 0.75 0.70 - 1.35 mg/dL    EGFR 103 > OR = 60 mL/min/1.73m2    BUN/CREATININE RATIO SEE NOTE: 6 - 22 (calc)    SODIUM 134 (L) 135 - 146 mmol/L    POTASSIUM 4.2 3.5 - 5.3 mmol/L    CHLORIDE 96 (L) 98 - 110 mmol/L    CARBON DIOXIDE 24 20 - 32 mmol/L    ELECTROLYTE BALANCE 14 7 - 17 mmol/L (calc)    CALCIUM 9.5 8.6 - 10.3 mg/dL   CBC and Auto Differential    Collection Time: 04/21/25  8:46 AM   Result Value Ref Range    WHITE BLOOD CELL COUNT 3.9 3.8 - 10.8 Thousand/uL    RED BLOOD CELL COUNT 4.38 4.20 - 5.80 Million/uL    HEMOGLOBIN 13.1 (L) 13.2 - 17.1 g/dL    HEMATOCRIT 39.7 38.5 - 50.0 %    MCV 90.6 80.0 - 100.0 fL    MCH 29.9 27.0 - 33.0 pg    MCHC 33.0 32.0 - 36.0 g/dL    RDW 16.7 (H) 11.0 - 15.0 %    PLATELET COUNT 125 (L) 140 - 400 Thousand/uL    MPV 11.7 7.5 - 12.5 fL    ABSOLUTE NEUTROPHILS 2,484 1,500 - 7,800 cells/uL    ABSOLUTE LYMPHOCYTES 827 (L) 850 - 3,900 cells/uL    ABSOLUTE MONOCYTES 429 200 - 950 cells/uL    ABSOLUTE EOSINOPHILS 129 15 - 500 cells/uL    ABSOLUTE BASOPHILS 31 0 - 200 cells/uL    NEUTROPHILS 63.7 %    LYMPHOCYTES 21.2 %    MONOCYTES 11.0 %    EOSINOPHILS 3.3 %    BASOPHILS  0.8 %   Prostate Specific Antigen, Screen    Collection Time: 04/21/25  8:46 AM   Result Value Ref Range    PSA, TOTAL 0.15 < OR = 4.00 ng/mL       Assessment & Plan  Hyponatremia  - His sodium level is borderline and we will continue to monitor.  He has been seen by Dr. Tomlin for evaluation and we will monitor periodically    Orders:    furosemide (Lasix) 40 mg tablet; Take 1 tablet (40 mg) by mouth 3 times a day.    Current mild episode of major depressive disorder without prior episode (CMS-HCC)  - We will start Wellbutrin 150 mg daily to hopefully help with depression and also help with tobacco cessation.  I will see him back in 4 weeks    Orders:    buPROPion XL (Wellbutrin XL) 150 mg 24 hr tablet; Take 1 tablet (150 mg) by mouth once daily in the morning. Do not crush, chew, or split.    Welcome to Medicare preventive visit  - We discussed fall prevention and then giving him a handout  -We will do a cursory eye examination-  -he is starting to exercise and we talked about eating a healthy diet  -I am giving him a handout on advanced directives and we talked about the importance of getting these completed         Low platelet count (CMS-HCC)  - His platelet count was low which seems to be isolated.  I will repeat the CBC in 1 month and we will go over the results together    Orders:    CBC and Auto Differential; Future    Portal hypertension (Multi)  - Stable at this time and he continues to be alcohol free.  -We will continue to monitor         Body mass index (BMI) 40.0-44.9, adult (Multi)  - He has lost several pounds over the past year and we can continue to encourage him to keep up the great work         Primary hypertension  - Currently adequately controlled so we will continue with his current antihypertensive regimen         Alcohol abuse  - He continues to be alcohol free which is excellent!         Intervertebral disc stenosis of neural canal of lumbar region  - He is receiving injections for his back  pain and getting good results         Vitamin D deficiency    Orders:    vitamin D3-vitamin K2 ([employer]oso[employer]) 137.5-200 mcg tablet; Take 2 Units by mouth 2 times a day. Pt takes 1 tab 2x/day    Vitamin D 25-Hydroxy,Total (for eval of Vitamin D levels); Future    Patient instructions  As we discussed I would like for you to call us and read off exactly what your calcium supplementation prescription reads and that way we can send it into your pharmacy  I sent a new prescription for medication called Wellbutrin and please start taking this once a day.  This medicine is designed to help with depression and also can possibly help you with stopping tobacco.  I also sent several handouts to you via [employer] going over ways to quit using tobacco, to go over fall prevention, and it discusses the advance directives which includes living will and power of  for healthcare.  We would like for you to complete your advance directives and provide a copy for your chart here.  We would like to see you back in 1 month and just a few days prior to that visit you will go to the lab so that we can check your vitamin D, and your platelet count.  Please keep up the great work with your exercise routine

## 2025-04-23 NOTE — ASSESSMENT & PLAN NOTE
- Currently adequately controlled so we will continue with his current antihypertensive regimen

## 2025-04-23 NOTE — ASSESSMENT & PLAN NOTE
- His sodium level is borderline and we will continue to monitor.  He has been seen by Dr. Tomlin for evaluation and we will monitor periodically    Orders:    furosemide (Lasix) 40 mg tablet; Take 1 tablet (40 mg) by mouth 3 times a day.

## 2025-04-23 NOTE — ASSESSMENT & PLAN NOTE
- His platelet count was low which seems to be isolated.  I will repeat the CBC in 1 month and we will go over the results together    Orders:    CBC and Auto Differential; Future

## 2025-04-23 NOTE — PATIENT INSTRUCTIONS
Patient instructions  As we discussed I would like for you to call us and read off exactly what your calcium supplementation prescription reads and that way we can send it into your pharmacy  I sent a new prescription for medication called Wellbutrin and please start taking this once a day.  This medicine is designed to help with depression and also can possibly help you with stopping tobacco.  I also sent several handouts to you via [employer] going over ways to quit using tobacco, to go over fall prevention, and it discusses the advance directives which includes living will and power of  for healthcare.  We would like for you to complete your advance directives and provide a copy for your chart here.  We would like to see you back in 1 month and just a few days prior to that visit you will go to the lab so that we can check your vitamin D, and your platelet count.  Please keep up the great work with your exercise routine

## 2025-05-23 ENCOUNTER — APPOINTMENT (OUTPATIENT)
Age: 60
End: 2025-05-23
Payer: MEDICARE

## 2025-05-23 DIAGNOSIS — E55.9 VITAMIN D DEFICIENCY: ICD-10-CM

## 2025-05-23 DIAGNOSIS — D69.6 LOW PLATELET COUNT: ICD-10-CM

## 2025-05-29 ENCOUNTER — APPOINTMENT (OUTPATIENT)
Age: 60
End: 2025-05-29
Payer: MEDICARE

## 2025-06-20 ENCOUNTER — APPOINTMENT (OUTPATIENT)
Age: 60
End: 2025-06-20
Payer: MEDICARE

## 2025-06-20 VITALS
SYSTOLIC BLOOD PRESSURE: 138 MMHG | HEIGHT: 70 IN | HEART RATE: 62 BPM | WEIGHT: 306.1 LBS | BODY MASS INDEX: 43.82 KG/M2 | DIASTOLIC BLOOD PRESSURE: 86 MMHG | OXYGEN SATURATION: 98 %

## 2025-06-20 DIAGNOSIS — R53.83 OTHER FATIGUE: Primary | ICD-10-CM

## 2025-06-20 DIAGNOSIS — E55.9 VITAMIN D DEFICIENCY: ICD-10-CM

## 2025-06-20 DIAGNOSIS — I10 PRIMARY HYPERTENSION: ICD-10-CM

## 2025-06-20 PROCEDURE — G2211 COMPLEX E/M VISIT ADD ON: HCPCS | Performed by: INTERNAL MEDICINE

## 2025-06-20 PROCEDURE — 3079F DIAST BP 80-89 MM HG: CPT | Performed by: INTERNAL MEDICINE

## 2025-06-20 PROCEDURE — 99213 OFFICE O/P EST LOW 20 MIN: CPT | Performed by: INTERNAL MEDICINE

## 2025-06-20 PROCEDURE — 3075F SYST BP GE 130 - 139MM HG: CPT | Performed by: INTERNAL MEDICINE

## 2025-06-20 PROCEDURE — 3008F BODY MASS INDEX DOCD: CPT | Performed by: INTERNAL MEDICINE

## 2025-06-20 ASSESSMENT — PATIENT HEALTH QUESTIONNAIRE - PHQ9
2. FEELING DOWN, DEPRESSED OR HOPELESS: NOT AT ALL
SUM OF ALL RESPONSES TO PHQ9 QUESTIONS 1 AND 2: 0
1. LITTLE INTEREST OR PLEASURE IN DOING THINGS: NOT AT ALL

## 2025-06-20 NOTE — PATIENT INSTRUCTIONS
Patient instructions  As we discussed you will get labs drawn this morning which will include a blood count, vitamin D, and testosterone level.  Once the results are known I will contact you.  Please reach out if you feel like your depression is returning and we will see you back in 3 months for reevaluation.  Please remember to get lab work done just prior to your next follow-up visit as we will be checking your sugar, kidney, and liver

## 2025-06-20 NOTE — PROGRESS NOTES
"Subjective   Patient ID: Tr Arnold is a 59 y.o. male who presents for Follow-up (1 MO CK).  HPI  He is here today for follow-up and primarily to discuss his depression.  We had prescribed Wellbutrin last time in the hopes of improving his mood and also helping him with smoking cessation.  He states he took it for 3 weeks and then \"I started feeling funny \".  He states a lot is happened since I saw him last time.  He moved out of his apartment and he realized that there was black mold in his dwelling.  He states he feels like this had something to do with his sense of wellbeing.  His new apartment he describes as \"peaceful \".  He states he has also been feeling good and he currently does not feel depressed.  He is still smoking and we remind him to try to quit.  We also had ordered lab work but he forgot to go.  We were checking vitamin D and blood count.  He is willing to go this morning.  He would also like to have his testosterone checked.  He states he has had low testosterone in the past and was receiving testosterone replacement therapy through urology.  I told him we would get a baseline but we would likely refer him back to urology for consideration of treatment.  We will call him with the results.  We will also see him back in 3 months to make sure he is doing okay with his depression and he will call if he feels like things are not going well  Objective   Physical Exam  Vitals and nursing note reviewed.   Constitutional:       General: He is not in acute distress.     Appearance: Normal appearance.   HENT:      Head: Normocephalic and atraumatic.   Eyes:      Conjunctiva/sclera: Conjunctivae normal.   Cardiovascular:      Rate and Rhythm: Normal rate and regular rhythm.      Heart sounds: Normal heart sounds.   Pulmonary:      Effort: No respiratory distress.      Breath sounds: No wheezing.   Abdominal:      Palpations: Abdomen is soft.      Tenderness: There is no abdominal tenderness. There is " no guarding.   Musculoskeletal:         General: No swelling. Normal range of motion.   Skin:     General: Skin is warm and dry.   Neurological:      General: No focal deficit present.      Mental Status: He is alert and oriented to person, place, and time.   Psychiatric:         Behavior: Behavior normal.       Assessment/Plan   Problem List Items Addressed This Visit           ICD-10-CM    Hypertension I10    Relevant Orders    Comprehensive Metabolic Panel    Vitamin D deficiency E55.9    Relevant Orders    Vitamin D 25-Hydroxy,Total (for eval of Vitamin D levels)    Other fatigue - Primary R53.83    - He will go for laboratory testing this morning and I have agreed to contact him with results         Relevant Orders    Testosterone, total and free    CBC and Auto Differential   Patient instructions  As we discussed you will get labs drawn this morning which will include a blood count, vitamin D, and testosterone level.  Once the results are known I will contact you.  Please reach out if you feel like your depression is returning and we will see you back in 3 months for reevaluation.  Please remember to get lab work done just prior to your next follow-up visit as we will be checking your sugar, kidney, and liver       Wilma Vidal, DO

## 2025-06-20 NOTE — ASSESSMENT & PLAN NOTE
- He will go for laboratory testing this morning and I have agreed to contact him with results   Expected Date Of Service: 07/30/2021

## 2025-06-21 LAB
25(OH)D3+25(OH)D2 SERPL-MCNC: 66 NG/ML (ref 30–100)
BASOPHILS # BLD AUTO: 20 CELLS/UL (ref 0–200)
BASOPHILS NFR BLD AUTO: 0.5 %
EOSINOPHIL # BLD AUTO: 168 CELLS/UL (ref 15–500)
EOSINOPHIL NFR BLD AUTO: 4.3 %
ERYTHROCYTE [DISTWIDTH] IN BLOOD BY AUTOMATED COUNT: 14.4 % (ref 11–15)
HCT VFR BLD AUTO: 43.2 % (ref 38.5–50)
HGB BLD-MCNC: 13.9 G/DL (ref 13.2–17.1)
LYMPHOCYTES # BLD AUTO: 620 CELLS/UL (ref 850–3900)
LYMPHOCYTES NFR BLD AUTO: 15.9 %
MCH RBC QN AUTO: 30 PG (ref 27–33)
MCHC RBC AUTO-ENTMCNC: 32.2 G/DL (ref 32–36)
MCV RBC AUTO: 93.3 FL (ref 80–100)
MONOCYTES # BLD AUTO: 538 CELLS/UL (ref 200–950)
MONOCYTES NFR BLD AUTO: 13.8 %
NEUTROPHILS # BLD AUTO: 2555 CELLS/UL (ref 1500–7800)
NEUTROPHILS NFR BLD AUTO: 65.5 %
PLATELET # BLD AUTO: 140 THOUSAND/UL (ref 140–400)
PMV BLD REES-ECKER: 12 FL (ref 7.5–12.5)
RBC # BLD AUTO: 4.63 MILLION/UL (ref 4.2–5.8)
TESTOST FREE SERPL-MCNC: NORMAL PG/ML
TESTOST SERPL-MCNC: NORMAL NG/DL
WBC # BLD AUTO: 3.9 THOUSAND/UL (ref 3.8–10.8)

## 2025-06-25 LAB
25(OH)D3+25(OH)D2 SERPL-MCNC: 66 NG/ML (ref 30–100)
BASOPHILS # BLD AUTO: 20 CELLS/UL (ref 0–200)
BASOPHILS NFR BLD AUTO: 0.5 %
EOSINOPHIL # BLD AUTO: 168 CELLS/UL (ref 15–500)
EOSINOPHIL NFR BLD AUTO: 4.3 %
ERYTHROCYTE [DISTWIDTH] IN BLOOD BY AUTOMATED COUNT: 14.4 % (ref 11–15)
HCT VFR BLD AUTO: 43.2 % (ref 38.5–50)
HGB BLD-MCNC: 13.9 G/DL (ref 13.2–17.1)
LYMPHOCYTES # BLD AUTO: 620 CELLS/UL (ref 850–3900)
LYMPHOCYTES NFR BLD AUTO: 15.9 %
MCH RBC QN AUTO: 30 PG (ref 27–33)
MCHC RBC AUTO-ENTMCNC: 32.2 G/DL (ref 32–36)
MCV RBC AUTO: 93.3 FL (ref 80–100)
MONOCYTES # BLD AUTO: 538 CELLS/UL (ref 200–950)
MONOCYTES NFR BLD AUTO: 13.8 %
NEUTROPHILS # BLD AUTO: 2555 CELLS/UL (ref 1500–7800)
NEUTROPHILS NFR BLD AUTO: 65.5 %
PLATELET # BLD AUTO: 140 THOUSAND/UL (ref 140–400)
PMV BLD REES-ECKER: 12 FL (ref 7.5–12.5)
RBC # BLD AUTO: 4.63 MILLION/UL (ref 4.2–5.8)
TESTOST FREE SERPL-MCNC: 25.4 PG/ML (ref 35–155)
TESTOST SERPL-MCNC: 313 NG/DL (ref 250–1100)
WBC # BLD AUTO: 3.9 THOUSAND/UL (ref 3.8–10.8)

## 2025-09-18 ENCOUNTER — APPOINTMENT (OUTPATIENT)
Age: 60
End: 2025-09-18
Payer: MEDICARE